# Patient Record
Sex: FEMALE | Race: BLACK OR AFRICAN AMERICAN | NOT HISPANIC OR LATINO | Employment: OTHER | URBAN - METROPOLITAN AREA
[De-identification: names, ages, dates, MRNs, and addresses within clinical notes are randomized per-mention and may not be internally consistent; named-entity substitution may affect disease eponyms.]

---

## 2017-04-25 ENCOUNTER — HISTORICAL (OUTPATIENT)
Dept: RADIOLOGY | Facility: HOSPITAL | Age: 67
End: 2017-04-25

## 2017-05-01 LAB
INFLUENZA A ANTIGEN, POC: NEGATIVE
INFLUENZA B ANTIGEN, POC: NEGATIVE
RAPID GROUP A STREP (OHS): POSITIVE

## 2018-04-26 ENCOUNTER — HISTORICAL (OUTPATIENT)
Dept: RADIOLOGY | Facility: HOSPITAL | Age: 68
End: 2018-04-26

## 2018-08-08 ENCOUNTER — HISTORICAL (OUTPATIENT)
Dept: ADMINISTRATIVE | Facility: HOSPITAL | Age: 68
End: 2018-08-08

## 2018-08-08 LAB
ABS NEUT (OLG): 3.71 X10(3)/MCL (ref 2.1–9.2)
ALBUMIN SERPL-MCNC: 3.4 GM/DL (ref 3.4–5)
ALBUMIN/GLOB SERPL: 0.7 {RATIO}
ALP SERPL-CCNC: 105 UNIT/L (ref 38–126)
ALT SERPL-CCNC: 19 UNIT/L (ref 12–78)
AST SERPL-CCNC: 18 UNIT/L (ref 15–37)
BASOPHILS # BLD AUTO: 0 X10(3)/MCL (ref 0–0.2)
BASOPHILS NFR BLD AUTO: 0.4 %
BILIRUB SERPL-MCNC: 0.3 MG/DL (ref 0.2–1)
BILIRUBIN DIRECT+TOT PNL SERPL-MCNC: 0.1 MG/DL (ref 0–0.2)
BILIRUBIN DIRECT+TOT PNL SERPL-MCNC: 0.2 MG/DL (ref 0–0.8)
BUN SERPL-MCNC: 18 MG/DL (ref 7–18)
CALCIUM SERPL-MCNC: 9.3 MG/DL (ref 8.5–10.1)
CEA SERPL-MCNC: 2.4 NG/ML (ref 0–3)
CHLORIDE SERPL-SCNC: 108 MMOL/L (ref 98–107)
CO2 SERPL-SCNC: 20 MMOL/L (ref 21–32)
CREAT SERPL-MCNC: 1.02 MG/DL (ref 0.55–1.02)
EOSINOPHIL # BLD AUTO: 0.1 X10(3)/MCL (ref 0–0.9)
EOSINOPHIL NFR BLD AUTO: 1.7 %
ERYTHROCYTE [DISTWIDTH] IN BLOOD BY AUTOMATED COUNT: 15.9 % (ref 11.5–17)
GLOBULIN SER-MCNC: 4.7 GM/DL (ref 2.4–3.5)
GLUCOSE SERPL-MCNC: 96 MG/DL (ref 74–106)
HCT VFR BLD AUTO: 31.1 % (ref 37–47)
HGB BLD-MCNC: 9.4 GM/DL (ref 12–16)
LYMPHOCYTES # BLD AUTO: 2.5 X10(3)/MCL (ref 0.6–4.6)
LYMPHOCYTES NFR BLD AUTO: 34.7 %
MCH RBC QN AUTO: 26.5 PG (ref 27–31)
MCHC RBC AUTO-ENTMCNC: 30.2 GM/DL (ref 33–36)
MCV RBC AUTO: 87.6 FL (ref 80–94)
MONOCYTES # BLD AUTO: 0.8 X10(3)/MCL (ref 0.1–1.3)
MONOCYTES NFR BLD AUTO: 10.7 %
NEUTROPHILS # BLD AUTO: 3.7 X10(3)/MCL (ref 2.1–9.2)
NEUTROPHILS NFR BLD AUTO: 52.5 %
PLATELET # BLD AUTO: 403 X10(3)/MCL (ref 130–400)
PMV BLD AUTO: 9.4 FL (ref 9.4–12.4)
POTASSIUM SERPL-SCNC: 4.2 MMOL/L (ref 3.5–5.1)
PROT SERPL-MCNC: 8.1 GM/DL (ref 6.4–8.2)
RBC # BLD AUTO: 3.55 X10(6)/MCL (ref 4.2–5.4)
SODIUM SERPL-SCNC: 140 MMOL/L (ref 136–145)
WBC # SPEC AUTO: 7.1 X10(3)/MCL (ref 4.5–11.5)

## 2018-08-15 ENCOUNTER — HISTORICAL (OUTPATIENT)
Dept: ADMINISTRATIVE | Facility: HOSPITAL | Age: 68
End: 2018-08-15

## 2018-08-27 ENCOUNTER — HISTORICAL (OUTPATIENT)
Dept: ADMINISTRATIVE | Facility: HOSPITAL | Age: 68
End: 2018-08-27

## 2018-08-27 LAB
ABS NEUT (OLG): 3.91 X10(3)/MCL (ref 2.1–9.2)
ALBUMIN SERPL-MCNC: 3.2 GM/DL (ref 3.4–5)
ALBUMIN/GLOB SERPL: 0.7 RATIO (ref 1.1–2)
ALP SERPL-CCNC: 100 UNIT/L (ref 38–126)
ALT SERPL-CCNC: 20 UNIT/L (ref 12–78)
AST SERPL-CCNC: 20 UNIT/L (ref 15–37)
BASOPHILS # BLD AUTO: 0 X10(3)/MCL (ref 0–0.2)
BASOPHILS NFR BLD AUTO: 0.3 %
BILIRUB SERPL-MCNC: 0.3 MG/DL (ref 0.2–1)
BILIRUBIN DIRECT+TOT PNL SERPL-MCNC: 0.1 MG/DL (ref 0–0.5)
BILIRUBIN DIRECT+TOT PNL SERPL-MCNC: 0.2 MG/DL (ref 0–0.8)
BUN SERPL-MCNC: 17 MG/DL (ref 7–18)
CALCIUM SERPL-MCNC: 9.7 MG/DL (ref 8.5–10.1)
CEA SERPL-MCNC: 1.2 NG/ML (ref 0–3)
CHLORIDE SERPL-SCNC: 107 MMOL/L (ref 98–107)
CO2 SERPL-SCNC: 22 MMOL/L (ref 21–32)
CREAT SERPL-MCNC: 0.98 MG/DL (ref 0.55–1.02)
EOSINOPHIL # BLD AUTO: 0.2 X10(3)/MCL (ref 0–0.9)
EOSINOPHIL NFR BLD AUTO: 2.3 %
ERYTHROCYTE [DISTWIDTH] IN BLOOD BY AUTOMATED COUNT: 16.1 % (ref 11.5–17)
FERRITIN SERPL-MCNC: 29.6 NG/ML (ref 8–388)
GLOBULIN SER-MCNC: 4.4 GM/DL (ref 2.4–3.5)
GLUCOSE SERPL-MCNC: 114 MG/DL (ref 74–106)
HCT VFR BLD AUTO: 32.8 % (ref 37–47)
HGB BLD-MCNC: 9.7 GM/DL (ref 12–16)
IRON SATN MFR SERPL: 11.3 % (ref 20–50)
IRON SERPL-MCNC: 45 MCG/DL (ref 50–175)
LYMPHOCYTES # BLD AUTO: 2.2 X10(3)/MCL (ref 0.6–4.6)
LYMPHOCYTES NFR BLD AUTO: 31 %
MCH RBC QN AUTO: 26.4 PG (ref 27–31)
MCHC RBC AUTO-ENTMCNC: 29.6 GM/DL (ref 33–36)
MCV RBC AUTO: 89.1 FL (ref 80–94)
MONOCYTES # BLD AUTO: 0.7 X10(3)/MCL (ref 0.1–1.3)
MONOCYTES NFR BLD AUTO: 10.2 %
NEUTROPHILS # BLD AUTO: 3.9 X10(3)/MCL (ref 2.1–9.2)
NEUTROPHILS NFR BLD AUTO: 56.2 %
PLATELET # BLD AUTO: 305 X10(3)/MCL (ref 130–400)
PMV BLD AUTO: 9.2 FL (ref 9.4–12.4)
POTASSIUM SERPL-SCNC: 4.1 MMOL/L (ref 3.5–5.1)
PROT SERPL-MCNC: 7.6 GM/DL (ref 6.4–8.2)
RBC # BLD AUTO: 3.68 X10(6)/MCL (ref 4.2–5.4)
SODIUM SERPL-SCNC: 138 MMOL/L (ref 136–145)
TIBC SERPL-MCNC: 398 MCG/DL (ref 250–450)
TRANSFERRIN SERPL-MCNC: 322 MG/DL (ref 200–360)
WBC # SPEC AUTO: 7 X10(3)/MCL (ref 4.5–11.5)

## 2018-08-28 ENCOUNTER — HISTORICAL (OUTPATIENT)
Dept: INFUSION THERAPY | Facility: HOSPITAL | Age: 68
End: 2018-08-28

## 2018-09-04 ENCOUNTER — HISTORICAL (OUTPATIENT)
Dept: HEMATOLOGY/ONCOLOGY | Facility: CLINIC | Age: 68
End: 2018-09-04

## 2018-09-04 LAB
ABS NEUT (OLG): 3.6 X10(3)/MCL (ref 2.1–9.2)
ANION GAP SERPL CALC-SCNC: 16 MMOL/L
BASOPHILS # BLD AUTO: 0 X10(3)/MCL (ref 0–0.2)
BASOPHILS NFR BLD AUTO: 0.5 %
BUN SERPL-MCNC: 16 MG/DL (ref 8–26)
CHLORIDE SERPL-SCNC: 106 MMOL/L (ref 98–109)
CREAT SERPL-MCNC: 1 MG/DL (ref 0.6–1.3)
EOSINOPHIL # BLD AUTO: 0.2 X10(3)/MCL (ref 0–0.9)
EOSINOPHIL NFR BLD AUTO: 2.6 %
ERYTHROCYTE [DISTWIDTH] IN BLOOD BY AUTOMATED COUNT: 15.5 % (ref 11.5–17)
GLUCOSE SERPL-MCNC: 135 MG/DL (ref 70–105)
HCT VFR BLD AUTO: 31.7 % (ref 37–47)
HCT VFR BLD CALC: 30 % (ref 38–51)
HGB BLD-MCNC: 10.2 MG/DL (ref 12–17)
HGB BLD-MCNC: 9.6 GM/DL (ref 12–16)
LYMPHOCYTES # BLD AUTO: 2.1 X10(3)/MCL (ref 0.6–4.6)
LYMPHOCYTES NFR BLD AUTO: 32.1 %
MCH RBC QN AUTO: 26.7 PG (ref 27–31)
MCHC RBC AUTO-ENTMCNC: 30.3 GM/DL (ref 33–36)
MCV RBC AUTO: 88.3 FL (ref 80–94)
MONOCYTES # BLD AUTO: 0.6 X10(3)/MCL (ref 0.1–1.3)
MONOCYTES NFR BLD AUTO: 9.3 %
NEUTROPHILS # BLD AUTO: 3.6 X10(3)/MCL (ref 2.1–9.2)
NEUTROPHILS NFR BLD AUTO: 55.5 %
PLATELET # BLD AUTO: 361 X10(3)/MCL (ref 130–400)
PMV BLD AUTO: 9.2 FL (ref 9.4–12.4)
POC IONIZED CALCIUM: 1.11 MMOL/L (ref 1.12–1.32)
POC TCO2: 23 MMOL/L (ref 24–29)
POTASSIUM BLD-SCNC: 3.6 MMOL/L (ref 3.5–4.9)
RBC # BLD AUTO: 3.59 X10(6)/MCL (ref 4.2–5.4)
SODIUM BLD-SCNC: 140 MMOL/L (ref 138–146)
WBC # SPEC AUTO: 6.5 X10(3)/MCL (ref 4.5–11.5)

## 2018-09-17 ENCOUNTER — HISTORICAL (OUTPATIENT)
Dept: ADMINISTRATIVE | Facility: HOSPITAL | Age: 68
End: 2018-09-17

## 2018-09-17 LAB
ABS NEUT (OLG): 1.94 X10(3)/MCL (ref 2.1–9.2)
ALBUMIN SERPL-MCNC: 3.2 GM/DL (ref 3.4–5)
ALBUMIN/GLOB SERPL: 0.8 RATIO (ref 1.1–2)
ALP SERPL-CCNC: 79 UNIT/L (ref 38–126)
ALT SERPL-CCNC: 17 UNIT/L (ref 12–78)
AST SERPL-CCNC: 22 UNIT/L (ref 15–37)
BASOPHILS # BLD AUTO: 0 X10(3)/MCL (ref 0–0.2)
BASOPHILS NFR BLD AUTO: 0.4 %
BILIRUB SERPL-MCNC: 0.4 MG/DL (ref 0.2–1)
BILIRUBIN DIRECT+TOT PNL SERPL-MCNC: 0.1 MG/DL (ref 0–0.5)
BILIRUBIN DIRECT+TOT PNL SERPL-MCNC: 0.3 MG/DL (ref 0–0.8)
BUN SERPL-MCNC: 13 MG/DL (ref 7–18)
CALCIUM SERPL-MCNC: 9.3 MG/DL (ref 8.5–10.1)
CHLORIDE SERPL-SCNC: 107 MMOL/L (ref 98–107)
CO2 SERPL-SCNC: 21 MMOL/L (ref 21–32)
CREAT SERPL-MCNC: 0.84 MG/DL (ref 0.55–1.02)
EOSINOPHIL # BLD AUTO: 0.1 X10(3)/MCL (ref 0–0.9)
EOSINOPHIL NFR BLD AUTO: 2.5 %
EOSINOPHIL NFR BLD MANUAL: 2 % (ref 0–8)
ERYTHROCYTE [DISTWIDTH] IN BLOOD BY AUTOMATED COUNT: 17.8 % (ref 11.5–17)
GLOBULIN SER-MCNC: 4.1 GM/DL (ref 2.4–3.5)
GLUCOSE SERPL-MCNC: 96 MG/DL (ref 74–106)
HCT VFR BLD AUTO: 31.2 % (ref 37–47)
HGB BLD-MCNC: 9.4 GM/DL (ref 12–16)
LYMPHOCYTES # BLD AUTO: 1.8 X10(3)/MCL (ref 0.6–4.6)
LYMPHOCYTES NFR BLD AUTO: 37.1 %
LYMPHOCYTES NFR BLD MANUAL: 45 % (ref 13–40)
MCH RBC QN AUTO: 27.2 PG (ref 27–31)
MCHC RBC AUTO-ENTMCNC: 30.1 GM/DL (ref 33–36)
MCV RBC AUTO: 90.4 FL (ref 80–94)
MONOCYTES # BLD AUTO: 0.9 X10(3)/MCL (ref 0.1–1.3)
MONOCYTES NFR BLD AUTO: 19.3 %
MONOCYTES NFR BLD MANUAL: 14 % (ref 2–11)
NEUTROPHILS # BLD AUTO: 1.9 X10(3)/MCL (ref 2.1–9.2)
NEUTROPHILS NFR BLD AUTO: 40.7 %
NEUTROPHILS NFR BLD MANUAL: 39 % (ref 47–80)
PLATELET # BLD AUTO: 304 X10(3)/MCL (ref 130–400)
PLATELET # BLD EST: NORMAL 10*3/UL
PMV BLD AUTO: 9.2 FL (ref 9.4–12.4)
POTASSIUM SERPL-SCNC: 4 MMOL/L (ref 3.5–5.1)
PROT SERPL-MCNC: 7.3 GM/DL (ref 6.4–8.2)
RBC # BLD AUTO: 3.45 X10(6)/MCL (ref 4.2–5.4)
RBC MORPH BLD: NORMAL
SODIUM SERPL-SCNC: 139 MMOL/L (ref 136–145)
WBC # SPEC AUTO: 4.8 X10(3)/MCL (ref 4.5–11.5)

## 2018-09-18 ENCOUNTER — HISTORICAL (OUTPATIENT)
Dept: INFUSION THERAPY | Facility: HOSPITAL | Age: 68
End: 2018-09-18

## 2018-09-25 ENCOUNTER — HISTORICAL (OUTPATIENT)
Dept: HEMATOLOGY/ONCOLOGY | Facility: CLINIC | Age: 68
End: 2018-09-25

## 2018-09-25 LAB
ABS NEUT (OLG): 2.58 X10(3)/MCL (ref 2.1–9.2)
ALBUMIN SERPL-MCNC: 3 GM/DL (ref 3.4–5)
ALBUMIN/GLOB SERPL: 0.8 {RATIO}
ALP SERPL-CCNC: 87 UNIT/L (ref 38–126)
ALT SERPL-CCNC: 23 UNIT/L (ref 12–78)
AST SERPL-CCNC: 33 UNIT/L (ref 15–37)
BASOPHILS # BLD AUTO: 0 X10(3)/MCL (ref 0–0.2)
BASOPHILS NFR BLD AUTO: 0.7 %
BILIRUB SERPL-MCNC: 0.3 MG/DL (ref 0.2–1)
BILIRUBIN DIRECT+TOT PNL SERPL-MCNC: 0.1 MG/DL (ref 0–0.2)
BILIRUBIN DIRECT+TOT PNL SERPL-MCNC: 0.2 MG/DL (ref 0–0.8)
BUN SERPL-MCNC: 14 MG/DL (ref 7–18)
CALCIUM SERPL-MCNC: 8.6 MG/DL (ref 8.5–10.1)
CHLORIDE SERPL-SCNC: 107 MMOL/L (ref 98–107)
CO2 SERPL-SCNC: 25 MMOL/L (ref 21–32)
CREAT SERPL-MCNC: 0.97 MG/DL (ref 0.55–1.02)
EOSINOPHIL # BLD AUTO: 0.2 X10(3)/MCL (ref 0–0.9)
EOSINOPHIL NFR BLD AUTO: 3.3 %
ERYTHROCYTE [DISTWIDTH] IN BLOOD BY AUTOMATED COUNT: 18.7 % (ref 11.5–17)
GLOBULIN SER-MCNC: 3.8 GM/DL (ref 2.4–3.5)
GLUCOSE SERPL-MCNC: 156 MG/DL (ref 74–106)
HCT VFR BLD AUTO: 32.4 % (ref 37–47)
HGB BLD-MCNC: 9.9 GM/DL (ref 12–16)
LYMPHOCYTES # BLD AUTO: 2.1 X10(3)/MCL (ref 0.6–4.6)
LYMPHOCYTES NFR BLD AUTO: 36 %
MCH RBC QN AUTO: 27.5 PG (ref 27–31)
MCHC RBC AUTO-ENTMCNC: 30.6 GM/DL (ref 33–36)
MCV RBC AUTO: 90 FL (ref 80–94)
MONOCYTES # BLD AUTO: 0.8 X10(3)/MCL (ref 0.1–1.3)
MONOCYTES NFR BLD AUTO: 14.9 %
NEUTROPHILS # BLD AUTO: 2.6 X10(3)/MCL (ref 2.1–9.2)
NEUTROPHILS NFR BLD AUTO: 45.1 %
PLATELET # BLD AUTO: 206 X10(3)/MCL (ref 130–400)
PMV BLD AUTO: 9.2 FL (ref 9.4–12.4)
POTASSIUM SERPL-SCNC: 4 MMOL/L (ref 3.5–5.1)
PROT SERPL-MCNC: 6.8 GM/DL (ref 6.4–8.2)
RBC # BLD AUTO: 3.6 X10(6)/MCL (ref 4.2–5.4)
SODIUM SERPL-SCNC: 142 MMOL/L (ref 136–145)
WBC # SPEC AUTO: 5.7 X10(3)/MCL (ref 4.5–11.5)

## 2018-10-02 ENCOUNTER — HISTORICAL (OUTPATIENT)
Dept: HEMATOLOGY/ONCOLOGY | Facility: CLINIC | Age: 68
End: 2018-10-02

## 2018-10-02 LAB
ABS NEUT (OLG): 3.27 X10(3)/MCL (ref 2.1–9.2)
ALBUMIN SERPL-MCNC: 3.2 GM/DL (ref 3.4–5)
ALBUMIN/GLOB SERPL: 0.8 {RATIO}
ALP SERPL-CCNC: 81 UNIT/L (ref 38–126)
ALT SERPL-CCNC: 19 UNIT/L (ref 12–78)
AST SERPL-CCNC: 26 UNIT/L (ref 15–37)
BASOPHILS # BLD AUTO: 0 X10(3)/MCL (ref 0–0.2)
BASOPHILS NFR BLD AUTO: 0.6 %
BILIRUB SERPL-MCNC: 0.6 MG/DL (ref 0.2–1)
BILIRUBIN DIRECT+TOT PNL SERPL-MCNC: 0.2 MG/DL (ref 0–0.2)
BILIRUBIN DIRECT+TOT PNL SERPL-MCNC: 0.4 MG/DL (ref 0–0.8)
BUN SERPL-MCNC: 19 MG/DL (ref 7–18)
CALCIUM SERPL-MCNC: 8.9 MG/DL (ref 8.5–10.1)
CHLORIDE SERPL-SCNC: 107 MMOL/L (ref 98–107)
CO2 SERPL-SCNC: 27 MMOL/L (ref 21–32)
CREAT SERPL-MCNC: 1.04 MG/DL (ref 0.55–1.02)
EOSINOPHIL # BLD AUTO: 0.1 X10(3)/MCL (ref 0–0.9)
EOSINOPHIL NFR BLD AUTO: 2.1 %
ERYTHROCYTE [DISTWIDTH] IN BLOOD BY AUTOMATED COUNT: 20.1 % (ref 11.5–17)
GLOBULIN SER-MCNC: 4.2 GM/DL (ref 2.4–3.5)
GLUCOSE SERPL-MCNC: 132 MG/DL (ref 74–106)
HCT VFR BLD AUTO: 32.6 % (ref 37–47)
HGB BLD-MCNC: 10.1 GM/DL (ref 12–16)
LYMPHOCYTES # BLD AUTO: 2.2 X10(3)/MCL (ref 0.6–4.6)
LYMPHOCYTES NFR BLD AUTO: 33.2 %
MCH RBC QN AUTO: 28.2 PG (ref 27–31)
MCHC RBC AUTO-ENTMCNC: 31 GM/DL (ref 33–36)
MCV RBC AUTO: 91.1 FL (ref 80–94)
MONOCYTES # BLD AUTO: 1 X10(3)/MCL (ref 0.1–1.3)
MONOCYTES NFR BLD AUTO: 14.8 %
NEUTROPHILS # BLD AUTO: 3.3 X10(3)/MCL (ref 2.1–9.2)
NEUTROPHILS NFR BLD AUTO: 49.3 %
PLATELET # BLD AUTO: 187 X10(3)/MCL (ref 130–400)
PMV BLD AUTO: 9.1 FL (ref 9.4–12.4)
POTASSIUM SERPL-SCNC: 4 MMOL/L (ref 3.5–5.1)
PROT SERPL-MCNC: 7.4 GM/DL (ref 6.4–8.2)
RBC # BLD AUTO: 3.58 X10(6)/MCL (ref 4.2–5.4)
SODIUM SERPL-SCNC: 144 MMOL/L (ref 136–145)
WBC # SPEC AUTO: 6.6 X10(3)/MCL (ref 4.5–11.5)

## 2018-10-08 ENCOUNTER — HISTORICAL (OUTPATIENT)
Dept: ADMINISTRATIVE | Facility: HOSPITAL | Age: 68
End: 2018-10-08

## 2018-10-08 LAB
ABS NEUT (OLG): 2.42 X10(3)/MCL (ref 2.1–9.2)
ALBUMIN SERPL-MCNC: 2.9 GM/DL (ref 3.4–5)
ALBUMIN/GLOB SERPL: 0.7 RATIO (ref 1.1–2)
ALP SERPL-CCNC: 80 UNIT/L (ref 38–126)
ALT SERPL-CCNC: 17 UNIT/L (ref 12–78)
AST SERPL-CCNC: 22 UNIT/L (ref 15–37)
BASOPHILS # BLD AUTO: 0 X10(3)/MCL (ref 0–0.2)
BASOPHILS NFR BLD AUTO: 0.4 %
BILIRUB SERPL-MCNC: 0.4 MG/DL (ref 0.2–1)
BILIRUBIN DIRECT+TOT PNL SERPL-MCNC: 0.2 MG/DL (ref 0–0.5)
BILIRUBIN DIRECT+TOT PNL SERPL-MCNC: 0.2 MG/DL (ref 0–0.8)
BUN SERPL-MCNC: 10 MG/DL (ref 7–18)
CALCIUM SERPL-MCNC: 9.2 MG/DL (ref 8.5–10.1)
CHLORIDE SERPL-SCNC: 107 MMOL/L (ref 98–107)
CO2 SERPL-SCNC: 24 MMOL/L (ref 21–32)
CREAT SERPL-MCNC: 0.99 MG/DL (ref 0.55–1.02)
EOSINOPHIL # BLD AUTO: 0.2 X10(3)/MCL (ref 0–0.9)
EOSINOPHIL NFR BLD AUTO: 3 %
ERYTHROCYTE [DISTWIDTH] IN BLOOD BY AUTOMATED COUNT: 21 % (ref 11.5–17)
GLOBULIN SER-MCNC: 4.1 GM/DL (ref 2.4–3.5)
GLUCOSE SERPL-MCNC: 160 MG/DL (ref 74–106)
HCT VFR BLD AUTO: 32.2 % (ref 37–47)
HGB BLD-MCNC: 9.7 GM/DL (ref 12–16)
LYMPHOCYTES # BLD AUTO: 1.6 X10(3)/MCL (ref 0.6–4.6)
LYMPHOCYTES NFR BLD AUTO: 33.1 %
MCH RBC QN AUTO: 28 PG (ref 27–31)
MCHC RBC AUTO-ENTMCNC: 30.1 GM/DL (ref 33–36)
MCV RBC AUTO: 93.1 FL (ref 80–94)
MONOCYTES # BLD AUTO: 0.7 X10(3)/MCL (ref 0.1–1.3)
MONOCYTES NFR BLD AUTO: 14.5 %
NEUTROPHILS # BLD AUTO: 2.4 X10(3)/MCL (ref 2.1–9.2)
NEUTROPHILS NFR BLD AUTO: 49 %
PLATELET # BLD AUTO: 234 X10(3)/MCL (ref 130–400)
PMV BLD AUTO: 9.3 FL (ref 9.4–12.4)
POTASSIUM SERPL-SCNC: 3.6 MMOL/L (ref 3.5–5.1)
PROT SERPL-MCNC: 7 GM/DL (ref 6.4–8.2)
RBC # BLD AUTO: 3.46 X10(6)/MCL (ref 4.2–5.4)
SODIUM SERPL-SCNC: 141 MMOL/L (ref 136–145)
WBC # SPEC AUTO: 5 X10(3)/MCL (ref 4.5–11.5)

## 2018-10-09 ENCOUNTER — HISTORICAL (OUTPATIENT)
Dept: INFUSION THERAPY | Facility: HOSPITAL | Age: 68
End: 2018-10-09

## 2018-10-16 ENCOUNTER — HISTORICAL (OUTPATIENT)
Dept: HEMATOLOGY/ONCOLOGY | Facility: CLINIC | Age: 68
End: 2018-10-16

## 2018-10-16 LAB
ABS NEUT (OLG): 4.36 X10(3)/MCL (ref 2.1–9.2)
ALBUMIN SERPL-MCNC: 3.3 GM/DL (ref 3.4–5)
ALBUMIN/GLOB SERPL: 0.8 {RATIO}
ALP SERPL-CCNC: 92 UNIT/L (ref 38–126)
ALT SERPL-CCNC: 22 UNIT/L (ref 12–78)
AST SERPL-CCNC: 35 UNIT/L (ref 15–37)
BASOPHILS # BLD AUTO: 0 X10(3)/MCL (ref 0–0.2)
BASOPHILS NFR BLD AUTO: 0.4 %
BILIRUB SERPL-MCNC: 0.6 MG/DL (ref 0.2–1)
BILIRUBIN DIRECT+TOT PNL SERPL-MCNC: 0.2 MG/DL (ref 0–0.2)
BILIRUBIN DIRECT+TOT PNL SERPL-MCNC: 0.4 MG/DL (ref 0–0.8)
BUN SERPL-MCNC: 14 MG/DL (ref 7–18)
CALCIUM SERPL-MCNC: 8.7 MG/DL (ref 8.5–10.1)
CHLORIDE SERPL-SCNC: 107 MMOL/L (ref 98–107)
CO2 SERPL-SCNC: 27 MMOL/L (ref 21–32)
CREAT SERPL-MCNC: 1.13 MG/DL (ref 0.55–1.02)
EOSINOPHIL # BLD AUTO: 0.2 X10(3)/MCL (ref 0–0.9)
EOSINOPHIL NFR BLD AUTO: 2.5 %
ERYTHROCYTE [DISTWIDTH] IN BLOOD BY AUTOMATED COUNT: 21.5 % (ref 11.5–17)
GLOBULIN SER-MCNC: 4.1 GM/DL (ref 2.4–3.5)
GLUCOSE SERPL-MCNC: 173 MG/DL (ref 74–106)
HCT VFR BLD AUTO: 34 % (ref 37–47)
HGB BLD-MCNC: 10.7 GM/DL (ref 12–16)
LYMPHOCYTES # BLD AUTO: 2.1 X10(3)/MCL (ref 0.6–4.6)
LYMPHOCYTES NFR BLD AUTO: 27.6 %
MCH RBC QN AUTO: 29 PG (ref 27–31)
MCHC RBC AUTO-ENTMCNC: 31.5 GM/DL (ref 33–36)
MCV RBC AUTO: 92.1 FL (ref 80–94)
MONOCYTES # BLD AUTO: 1 X10(3)/MCL (ref 0.1–1.3)
MONOCYTES NFR BLD AUTO: 13.3 %
NEUTROPHILS # BLD AUTO: 4.4 X10(3)/MCL (ref 2.1–9.2)
NEUTROPHILS NFR BLD AUTO: 56.2 %
PLATELET # BLD AUTO: 200 X10(3)/MCL (ref 130–400)
PMV BLD AUTO: 8.9 FL (ref 9.4–12.4)
POTASSIUM SERPL-SCNC: 4 MMOL/L (ref 3.5–5.1)
PROT SERPL-MCNC: 7.4 GM/DL (ref 6.4–8.2)
RBC # BLD AUTO: 3.69 X10(6)/MCL (ref 4.2–5.4)
SODIUM SERPL-SCNC: 141 MMOL/L (ref 136–145)
WBC # SPEC AUTO: 7.8 X10(3)/MCL (ref 4.5–11.5)

## 2018-10-23 ENCOUNTER — HISTORICAL (OUTPATIENT)
Dept: HEMATOLOGY/ONCOLOGY | Facility: CLINIC | Age: 68
End: 2018-10-23

## 2018-10-23 LAB
ABS NEUT (OLG): 2.61 X10(3)/MCL (ref 2.1–9.2)
ALBUMIN SERPL-MCNC: 3 GM/DL (ref 3.4–5)
ALBUMIN/GLOB SERPL: 0.7 {RATIO}
ALP SERPL-CCNC: 81 UNIT/L (ref 38–126)
ALT SERPL-CCNC: 18 UNIT/L (ref 12–78)
AST SERPL-CCNC: 28 UNIT/L (ref 15–37)
BASOPHILS # BLD AUTO: 0 X10(3)/MCL (ref 0–0.2)
BASOPHILS NFR BLD AUTO: 0.6 %
BILIRUB SERPL-MCNC: 0.5 MG/DL (ref 0.2–1)
BILIRUBIN DIRECT+TOT PNL SERPL-MCNC: 0.2 MG/DL (ref 0–0.2)
BILIRUBIN DIRECT+TOT PNL SERPL-MCNC: 0.3 MG/DL (ref 0–0.8)
BUN SERPL-MCNC: 16 MG/DL (ref 7–18)
CALCIUM SERPL-MCNC: 9.2 MG/DL (ref 8.5–10.1)
CHLORIDE SERPL-SCNC: 108 MMOL/L (ref 98–107)
CO2 SERPL-SCNC: 25 MMOL/L (ref 21–32)
CREAT SERPL-MCNC: 0.94 MG/DL (ref 0.55–1.02)
EOSINOPHIL # BLD AUTO: 0.1 X10(3)/MCL (ref 0–0.9)
EOSINOPHIL NFR BLD AUTO: 2.3 %
ERYTHROCYTE [DISTWIDTH] IN BLOOD BY AUTOMATED COUNT: 22.8 % (ref 11.5–17)
GLOBULIN SER-MCNC: 4.1 GM/DL (ref 2.4–3.5)
GLUCOSE SERPL-MCNC: 186 MG/DL (ref 74–106)
HCT VFR BLD AUTO: 31.5 % (ref 37–47)
HGB BLD-MCNC: 9.8 GM/DL (ref 12–16)
LYMPHOCYTES # BLD AUTO: 1.7 X10(3)/MCL (ref 0.6–4.6)
LYMPHOCYTES NFR BLD AUTO: 33.3 %
MCH RBC QN AUTO: 29.2 PG (ref 27–31)
MCHC RBC AUTO-ENTMCNC: 31.1 GM/DL (ref 33–36)
MCV RBC AUTO: 93.8 FL (ref 80–94)
MONOCYTES # BLD AUTO: 0.6 X10(3)/MCL (ref 0.1–1.3)
MONOCYTES NFR BLD AUTO: 12.7 %
NEUTROPHILS # BLD AUTO: 2.6 X10(3)/MCL (ref 2.1–9.2)
NEUTROPHILS NFR BLD AUTO: 51.1 %
PLATELET # BLD AUTO: 166 X10(3)/MCL (ref 130–400)
PMV BLD AUTO: 9.5 FL (ref 9.4–12.4)
POTASSIUM SERPL-SCNC: 4.6 MMOL/L (ref 3.5–5.1)
PROT SERPL-MCNC: 7.1 GM/DL (ref 6.4–8.2)
RBC # BLD AUTO: 3.36 X10(6)/MCL (ref 4.2–5.4)
SODIUM SERPL-SCNC: 144 MMOL/L (ref 136–145)
WBC # SPEC AUTO: 5.1 X10(3)/MCL (ref 4.5–11.5)

## 2018-10-29 ENCOUNTER — HISTORICAL (OUTPATIENT)
Dept: ADMINISTRATIVE | Facility: HOSPITAL | Age: 68
End: 2018-10-29

## 2018-10-29 LAB
ABS NEUT (OLG): 1.83 X10(3)/MCL (ref 2.1–9.2)
ALBUMIN SERPL-MCNC: 3 GM/DL (ref 3.4–5)
ALBUMIN/GLOB SERPL: 0.8 {RATIO}
ALP SERPL-CCNC: 80 UNIT/L (ref 38–126)
ALT SERPL-CCNC: 18 UNIT/L (ref 12–78)
AST SERPL-CCNC: 23 UNIT/L (ref 15–37)
BASOPHILS # BLD AUTO: 0 X10(3)/MCL (ref 0–0.2)
BASOPHILS NFR BLD AUTO: 0.6 %
BILIRUB SERPL-MCNC: 0.3 MG/DL (ref 0.2–1)
BILIRUBIN DIRECT+TOT PNL SERPL-MCNC: 0.1 MG/DL (ref 0–0.2)
BILIRUBIN DIRECT+TOT PNL SERPL-MCNC: 0.2 MG/DL (ref 0–0.8)
BUN SERPL-MCNC: 19 MG/DL (ref 7–18)
CALCIUM SERPL-MCNC: 8.5 MG/DL (ref 8.5–10.1)
CEA SERPL-MCNC: 2.7 NG/ML (ref 0–3)
CHLORIDE SERPL-SCNC: 108 MMOL/L (ref 98–107)
CO2 SERPL-SCNC: 26 MMOL/L (ref 21–32)
CREAT SERPL-MCNC: 1.16 MG/DL (ref 0.55–1.02)
EOSINOPHIL # BLD AUTO: 0.2 X10(3)/MCL (ref 0–0.9)
EOSINOPHIL NFR BLD AUTO: 3.3 %
ERYTHROCYTE [DISTWIDTH] IN BLOOD BY AUTOMATED COUNT: 23.3 % (ref 11.5–17)
GLOBULIN SER-MCNC: 3.8 GM/DL (ref 2.4–3.5)
GLUCOSE SERPL-MCNC: 192 MG/DL (ref 74–106)
HCT VFR BLD AUTO: 31.8 % (ref 37–47)
HGB BLD-MCNC: 10 GM/DL (ref 12–16)
LYMPHOCYTES # BLD AUTO: 1.9 X10(3)/MCL (ref 0.6–4.6)
LYMPHOCYTES NFR BLD AUTO: 40.4 %
MCH RBC QN AUTO: 30 PG (ref 27–31)
MCHC RBC AUTO-ENTMCNC: 31.4 GM/DL (ref 33–36)
MCV RBC AUTO: 95.5 FL (ref 80–94)
MONOCYTES # BLD AUTO: 0.8 X10(3)/MCL (ref 0.1–1.3)
MONOCYTES NFR BLD AUTO: 17.4 %
NEUTROPHILS # BLD AUTO: 1.8 X10(3)/MCL (ref 2.1–9.2)
NEUTROPHILS NFR BLD AUTO: 38.3 %
PLATELET # BLD AUTO: 201 X10(3)/MCL (ref 130–400)
PMV BLD AUTO: 8.8 FL (ref 9.4–12.4)
POTASSIUM SERPL-SCNC: 4.2 MMOL/L (ref 3.5–5.1)
PROT SERPL-MCNC: 6.8 GM/DL (ref 6.4–8.2)
RBC # BLD AUTO: 3.33 X10(6)/MCL (ref 4.2–5.4)
SODIUM SERPL-SCNC: 142 MMOL/L (ref 136–145)
WBC # SPEC AUTO: 4.8 X10(3)/MCL (ref 4.5–11.5)

## 2018-10-30 ENCOUNTER — HISTORICAL (OUTPATIENT)
Dept: INFUSION THERAPY | Facility: HOSPITAL | Age: 68
End: 2018-10-30

## 2018-11-07 ENCOUNTER — HISTORICAL (OUTPATIENT)
Dept: HEMATOLOGY/ONCOLOGY | Facility: CLINIC | Age: 68
End: 2018-11-07

## 2018-11-07 LAB
ABS NEUT (OLG): 3.16 X10(3)/MCL (ref 2.1–9.2)
ALBUMIN SERPL-MCNC: 3 GM/DL (ref 3.4–5)
ALBUMIN/GLOB SERPL: 0.8 {RATIO}
ALP SERPL-CCNC: 90 UNIT/L (ref 38–126)
ALT SERPL-CCNC: 19 UNIT/L (ref 12–78)
AST SERPL-CCNC: 28 UNIT/L (ref 15–37)
BASOPHILS # BLD AUTO: 0 X10(3)/MCL (ref 0–0.2)
BASOPHILS NFR BLD AUTO: 0.6 %
BILIRUB SERPL-MCNC: 0.4 MG/DL (ref 0.2–1)
BILIRUBIN DIRECT+TOT PNL SERPL-MCNC: 0.2 MG/DL (ref 0–0.2)
BILIRUBIN DIRECT+TOT PNL SERPL-MCNC: 0.2 MG/DL (ref 0–0.8)
BUN SERPL-MCNC: 15 MG/DL (ref 7–18)
CALCIUM SERPL-MCNC: 8.9 MG/DL (ref 8.5–10.1)
CHLORIDE SERPL-SCNC: 107 MMOL/L (ref 98–107)
CO2 SERPL-SCNC: 25 MMOL/L (ref 21–32)
CREAT SERPL-MCNC: 1.01 MG/DL (ref 0.55–1.02)
EOSINOPHIL # BLD AUTO: 0.2 X10(3)/MCL (ref 0–0.9)
EOSINOPHIL NFR BLD AUTO: 3.7 %
ERYTHROCYTE [DISTWIDTH] IN BLOOD BY AUTOMATED COUNT: 23.2 % (ref 11.5–17)
GLOBULIN SER-MCNC: 3.9 GM/DL (ref 2.4–3.5)
GLUCOSE SERPL-MCNC: 180 MG/DL (ref 74–106)
HCT VFR BLD AUTO: 31.2 % (ref 37–47)
HGB BLD-MCNC: 9.7 GM/DL (ref 12–16)
LYMPHOCYTES # BLD AUTO: 2 X10(3)/MCL (ref 0.6–4.6)
LYMPHOCYTES NFR BLD AUTO: 31.5 %
MCH RBC QN AUTO: 29.8 PG (ref 27–31)
MCHC RBC AUTO-ENTMCNC: 31.1 GM/DL (ref 33–36)
MCV RBC AUTO: 96 FL (ref 80–94)
MONOCYTES # BLD AUTO: 0.8 X10(3)/MCL (ref 0.1–1.3)
MONOCYTES NFR BLD AUTO: 13.2 %
NEUTROPHILS # BLD AUTO: 3.2 X10(3)/MCL (ref 2.1–9.2)
NEUTROPHILS NFR BLD AUTO: 51 %
PLATELET # BLD AUTO: 166 X10(3)/MCL (ref 130–400)
PMV BLD AUTO: 9 FL (ref 9.4–12.4)
POTASSIUM SERPL-SCNC: 4.2 MMOL/L (ref 3.5–5.1)
PROT SERPL-MCNC: 6.9 GM/DL (ref 6.4–8.2)
RBC # BLD AUTO: 3.25 X10(6)/MCL (ref 4.2–5.4)
SODIUM SERPL-SCNC: 140 MMOL/L (ref 136–145)
WBC # SPEC AUTO: 6.2 X10(3)/MCL (ref 4.5–11.5)

## 2018-11-14 ENCOUNTER — HISTORICAL (OUTPATIENT)
Dept: HEMATOLOGY/ONCOLOGY | Facility: CLINIC | Age: 68
End: 2018-11-14

## 2018-11-14 LAB
ABS NEUT (OLG): 2.68 X10(3)/MCL (ref 2.1–9.2)
ALBUMIN SERPL-MCNC: 2.9 GM/DL (ref 3.4–5)
ALBUMIN/GLOB SERPL: 0.7 RATIO (ref 1.1–2)
ALP SERPL-CCNC: 104 UNIT/L (ref 38–126)
ALT SERPL-CCNC: 20 UNIT/L (ref 12–78)
AST SERPL-CCNC: 28 UNIT/L (ref 15–37)
BASOPHILS # BLD AUTO: 0 X10(3)/MCL (ref 0–0.2)
BASOPHILS NFR BLD AUTO: 0.7 %
BILIRUB SERPL-MCNC: 0.4 MG/DL (ref 0.2–1)
BILIRUBIN DIRECT+TOT PNL SERPL-MCNC: 0.1 MG/DL (ref 0–0.5)
BILIRUBIN DIRECT+TOT PNL SERPL-MCNC: 0.3 MG/DL (ref 0–0.8)
BUN SERPL-MCNC: 14 MG/DL (ref 7–18)
CALCIUM SERPL-MCNC: 8.5 MG/DL (ref 8.5–10.1)
CHLORIDE SERPL-SCNC: 108 MMOL/L (ref 98–107)
CO2 SERPL-SCNC: 28 MMOL/L (ref 21–32)
CREAT SERPL-MCNC: 1.05 MG/DL (ref 0.55–1.02)
EOSINOPHIL # BLD AUTO: 0.2 X10(3)/MCL (ref 0–0.9)
EOSINOPHIL NFR BLD AUTO: 3.6 %
ERYTHROCYTE [DISTWIDTH] IN BLOOD BY AUTOMATED COUNT: 22.2 % (ref 11.5–17)
GLOBULIN SER-MCNC: 4.3 GM/DL (ref 2.4–3.5)
GLUCOSE SERPL-MCNC: 188 MG/DL (ref 74–106)
HCT VFR BLD AUTO: 33.9 % (ref 37–47)
HGB BLD-MCNC: 10.3 GM/DL (ref 12–16)
LYMPHOCYTES # BLD AUTO: 1.7 X10(3)/MCL (ref 0.6–4.6)
LYMPHOCYTES NFR BLD AUTO: 31.4 %
MCH RBC QN AUTO: 29.6 PG (ref 27–31)
MCHC RBC AUTO-ENTMCNC: 30.4 GM/DL (ref 33–36)
MCV RBC AUTO: 97.4 FL (ref 80–94)
MONOCYTES # BLD AUTO: 0.7 X10(3)/MCL (ref 0.1–1.3)
MONOCYTES NFR BLD AUTO: 13.8 %
NEUTROPHILS # BLD AUTO: 2.7 X10(3)/MCL (ref 2.1–9.2)
NEUTROPHILS NFR BLD AUTO: 50.1 %
PLATELET # BLD AUTO: 135 X10(3)/MCL (ref 130–400)
PMV BLD AUTO: 9 FL (ref 9.4–12.4)
POTASSIUM SERPL-SCNC: 4.2 MMOL/L (ref 3.5–5.1)
PROT SERPL-MCNC: 7.2 GM/DL (ref 6.4–8.2)
RBC # BLD AUTO: 3.48 X10(6)/MCL (ref 4.2–5.4)
SODIUM SERPL-SCNC: 141 MMOL/L (ref 136–145)
WBC # SPEC AUTO: 5.4 X10(3)/MCL (ref 4.5–11.5)

## 2018-11-20 ENCOUNTER — HISTORICAL (OUTPATIENT)
Dept: RADIOLOGY | Facility: HOSPITAL | Age: 68
End: 2018-11-20

## 2018-11-27 ENCOUNTER — HISTORICAL (OUTPATIENT)
Dept: ADMINISTRATIVE | Facility: HOSPITAL | Age: 68
End: 2018-11-27

## 2018-11-27 LAB
ABS NEUT (OLG): 2.74 X10(3)/MCL (ref 2.1–9.2)
ALBUMIN SERPL-MCNC: 3.1 GM/DL (ref 3.4–5)
ALBUMIN/GLOB SERPL: 0.7 RATIO (ref 1.1–2)
ALP SERPL-CCNC: 93 UNIT/L (ref 38–126)
ALT SERPL-CCNC: 19 UNIT/L (ref 12–78)
AST SERPL-CCNC: 22 UNIT/L (ref 15–37)
BASOPHILS # BLD AUTO: 0 X10(3)/MCL (ref 0–0.2)
BASOPHILS NFR BLD AUTO: 0.9 %
BILIRUB SERPL-MCNC: 0.2 MG/DL (ref 0.2–1)
BILIRUBIN DIRECT+TOT PNL SERPL-MCNC: 0.1 MG/DL (ref 0–0.5)
BILIRUBIN DIRECT+TOT PNL SERPL-MCNC: 0.1 MG/DL (ref 0–0.8)
BUN SERPL-MCNC: 16 MG/DL (ref 7–18)
CALCIUM SERPL-MCNC: 8.5 MG/DL (ref 8.5–10.1)
CHLORIDE SERPL-SCNC: 107 MMOL/L (ref 98–107)
CO2 SERPL-SCNC: 27 MMOL/L (ref 21–32)
CREAT SERPL-MCNC: 0.93 MG/DL (ref 0.55–1.02)
EOSINOPHIL # BLD AUTO: 0.2 X10(3)/MCL (ref 0–0.9)
EOSINOPHIL NFR BLD AUTO: 3.4 %
ERYTHROCYTE [DISTWIDTH] IN BLOOD BY AUTOMATED COUNT: 20.1 % (ref 11.5–17)
GLOBULIN SER-MCNC: 4.3 GM/DL (ref 2.4–3.5)
GLUCOSE SERPL-MCNC: 91 MG/DL (ref 74–106)
HCT VFR BLD AUTO: 34.2 % (ref 37–47)
HGB BLD-MCNC: 10.5 GM/DL (ref 12–16)
LYMPHOCYTES # BLD AUTO: 2 X10(3)/MCL (ref 0.6–4.6)
LYMPHOCYTES NFR BLD AUTO: 34 %
MCH RBC QN AUTO: 29.7 PG (ref 27–31)
MCHC RBC AUTO-ENTMCNC: 30.7 GM/DL (ref 33–36)
MCV RBC AUTO: 96.6 FL (ref 80–94)
MONOCYTES # BLD AUTO: 0.8 X10(3)/MCL (ref 0.1–1.3)
MONOCYTES NFR BLD AUTO: 14.1 %
NEUTROPHILS # BLD AUTO: 2.7 X10(3)/MCL (ref 2.1–9.2)
NEUTROPHILS NFR BLD AUTO: 47.1 %
PLATELET # BLD AUTO: 212 X10(3)/MCL (ref 130–400)
PMV BLD AUTO: 9.1 FL (ref 9.4–12.4)
POTASSIUM SERPL-SCNC: 3.9 MMOL/L (ref 3.5–5.1)
PROT SERPL-MCNC: 7.4 GM/DL (ref 6.4–8.2)
RBC # BLD AUTO: 3.54 X10(6)/MCL (ref 4.2–5.4)
SODIUM SERPL-SCNC: 142 MMOL/L (ref 136–145)
WBC # SPEC AUTO: 5.8 X10(3)/MCL (ref 4.5–11.5)

## 2019-01-07 ENCOUNTER — HISTORICAL (OUTPATIENT)
Dept: INFUSION THERAPY | Facility: HOSPITAL | Age: 69
End: 2019-01-07

## 2019-01-14 ENCOUNTER — HISTORICAL (OUTPATIENT)
Dept: RADIOLOGY | Facility: HOSPITAL | Age: 69
End: 2019-01-14

## 2019-02-27 ENCOUNTER — HISTORICAL (OUTPATIENT)
Dept: ADMINISTRATIVE | Facility: HOSPITAL | Age: 69
End: 2019-02-27

## 2019-02-27 LAB
ABS NEUT (OLG): 2.65 X10(3)/MCL (ref 2.1–9.2)
ALBUMIN SERPL-MCNC: 3.5 GM/DL (ref 3.4–5)
ALBUMIN/GLOB SERPL: 0.8 {RATIO}
ALP SERPL-CCNC: 100 UNIT/L (ref 38–126)
ALT SERPL-CCNC: 21 UNIT/L (ref 12–78)
AST SERPL-CCNC: 13 UNIT/L (ref 15–37)
BASOPHILS # BLD AUTO: 0 X10(3)/MCL (ref 0–0.2)
BASOPHILS NFR BLD AUTO: 0.5 %
BILIRUB SERPL-MCNC: 0.5 MG/DL (ref 0.2–1)
BILIRUBIN DIRECT+TOT PNL SERPL-MCNC: 0.1 MG/DL (ref 0–0.2)
BILIRUBIN DIRECT+TOT PNL SERPL-MCNC: 0.4 MG/DL (ref 0–0.8)
BUN SERPL-MCNC: 25 MG/DL (ref 7–18)
CALCIUM SERPL-MCNC: 8.6 MG/DL (ref 8.5–10.1)
CEA SERPL-MCNC: 1.4 NG/ML (ref 0–3)
CHLORIDE SERPL-SCNC: 105 MMOL/L (ref 98–107)
CO2 SERPL-SCNC: 29 MMOL/L (ref 21–32)
CREAT SERPL-MCNC: 0.94 MG/DL (ref 0.55–1.02)
EOSINOPHIL # BLD AUTO: 0.2 X10(3)/MCL (ref 0–0.9)
EOSINOPHIL NFR BLD AUTO: 3.1 %
ERYTHROCYTE [DISTWIDTH] IN BLOOD BY AUTOMATED COUNT: 13.5 % (ref 11.5–17)
GLOBULIN SER-MCNC: 4.3 GM/DL (ref 2.4–3.5)
GLUCOSE SERPL-MCNC: 110 MG/DL (ref 74–106)
HCT VFR BLD AUTO: 36.8 % (ref 37–47)
HGB BLD-MCNC: 11.1 GM/DL (ref 12–16)
LYMPHOCYTES # BLD AUTO: 2.1 X10(3)/MCL (ref 0.6–4.6)
LYMPHOCYTES NFR BLD AUTO: 37.4 %
MCH RBC QN AUTO: 27.7 PG (ref 27–31)
MCHC RBC AUTO-ENTMCNC: 30.2 GM/DL (ref 33–36)
MCV RBC AUTO: 91.8 FL (ref 80–94)
MONOCYTES # BLD AUTO: 0.6 X10(3)/MCL (ref 0.1–1.3)
MONOCYTES NFR BLD AUTO: 10.7 %
NEUTROPHILS # BLD AUTO: 2.6 X10(3)/MCL (ref 2.1–9.2)
NEUTROPHILS NFR BLD AUTO: 47.9 %
PLATELET # BLD AUTO: 305 X10(3)/MCL (ref 130–400)
PMV BLD AUTO: 8.9 FL (ref 9.4–12.4)
POTASSIUM SERPL-SCNC: 4.5 MMOL/L (ref 3.5–5.1)
PROT SERPL-MCNC: 7.8 GM/DL (ref 6.4–8.2)
RBC # BLD AUTO: 4.01 X10(6)/MCL (ref 4.2–5.4)
SODIUM SERPL-SCNC: 140 MMOL/L (ref 136–145)
WBC # SPEC AUTO: 5.5 X10(3)/MCL (ref 4.5–11.5)

## 2019-03-09 LAB — RAPID GROUP A STREP (OHS): NEGATIVE

## 2019-04-01 ENCOUNTER — HISTORICAL (OUTPATIENT)
Dept: INFUSION THERAPY | Facility: HOSPITAL | Age: 69
End: 2019-04-01

## 2019-05-30 ENCOUNTER — HISTORICAL (OUTPATIENT)
Dept: HEMATOLOGY/ONCOLOGY | Facility: CLINIC | Age: 69
End: 2019-05-30

## 2019-05-30 LAB
ABS NEUT (OLG): 3.69 X10(3)/MCL (ref 2.1–9.2)
ALBUMIN SERPL-MCNC: 3.2 GM/DL (ref 3.4–5)
ALBUMIN/GLOB SERPL: 0.8 RATIO (ref 1.1–2)
ALP SERPL-CCNC: 108 UNIT/L (ref 38–126)
ALT SERPL-CCNC: 20 UNIT/L (ref 12–78)
AST SERPL-CCNC: 14 UNIT/L (ref 15–37)
BASOPHILS # BLD AUTO: 0 X10(3)/MCL (ref 0–0.2)
BASOPHILS NFR BLD AUTO: 0.5 %
BILIRUB SERPL-MCNC: 0.4 MG/DL (ref 0.2–1)
BILIRUBIN DIRECT+TOT PNL SERPL-MCNC: 0.1 MG/DL (ref 0–0.5)
BILIRUBIN DIRECT+TOT PNL SERPL-MCNC: 0.3 MG/DL (ref 0–0.8)
BUN SERPL-MCNC: 25 MG/DL (ref 7–18)
CALCIUM SERPL-MCNC: 8.6 MG/DL (ref 8.5–10.1)
CEA SERPL-MCNC: 1.2 NG/ML (ref 0–3)
CHLORIDE SERPL-SCNC: 107 MMOL/L (ref 98–107)
CO2 SERPL-SCNC: 25 MMOL/L (ref 21–32)
CREAT SERPL-MCNC: 1.13 MG/DL (ref 0.55–1.02)
EOSINOPHIL # BLD AUTO: 0.2 X10(3)/MCL (ref 0–0.9)
EOSINOPHIL NFR BLD AUTO: 3 %
ERYTHROCYTE [DISTWIDTH] IN BLOOD BY AUTOMATED COUNT: 14.3 % (ref 11.5–17)
GLOBULIN SER-MCNC: 4.1 GM/DL (ref 2.4–3.5)
GLUCOSE SERPL-MCNC: 187 MG/DL (ref 74–106)
HCT VFR BLD AUTO: 34.3 % (ref 37–47)
HGB BLD-MCNC: 10.4 GM/DL (ref 12–16)
LYMPHOCYTES # BLD AUTO: 1.8 X10(3)/MCL (ref 0.6–4.6)
LYMPHOCYTES NFR BLD AUTO: 28.7 %
MCH RBC QN AUTO: 27.2 PG (ref 27–31)
MCHC RBC AUTO-ENTMCNC: 30.3 GM/DL (ref 33–36)
MCV RBC AUTO: 89.8 FL (ref 80–94)
MONOCYTES # BLD AUTO: 0.6 X10(3)/MCL (ref 0.1–1.3)
MONOCYTES NFR BLD AUTO: 8.8 %
NEUTROPHILS # BLD AUTO: 3.7 X10(3)/MCL (ref 2.1–9.2)
NEUTROPHILS NFR BLD AUTO: 58.7 %
PLATELET # BLD AUTO: 232 X10(3)/MCL (ref 130–400)
PMV BLD AUTO: 9.9 FL (ref 9.4–12.4)
POTASSIUM SERPL-SCNC: 4.4 MMOL/L (ref 3.5–5.1)
PROT SERPL-MCNC: 7.3 GM/DL (ref 6.4–8.2)
RBC # BLD AUTO: 3.82 X10(6)/MCL (ref 4.2–5.4)
SODIUM SERPL-SCNC: 139 MMOL/L (ref 136–145)
WBC # SPEC AUTO: 6.3 X10(3)/MCL (ref 4.5–11.5)

## 2019-05-31 ENCOUNTER — HISTORICAL (OUTPATIENT)
Dept: ADMINISTRATIVE | Facility: HOSPITAL | Age: 69
End: 2019-05-31

## 2019-06-11 ENCOUNTER — HISTORICAL (OUTPATIENT)
Dept: INFUSION THERAPY | Facility: HOSPITAL | Age: 69
End: 2019-06-11

## 2019-08-28 ENCOUNTER — HISTORICAL (OUTPATIENT)
Dept: INFUSION THERAPY | Facility: HOSPITAL | Age: 69
End: 2019-08-28

## 2019-08-28 LAB
ABS NEUT (OLG): 4.18 X10(3)/MCL (ref 2.1–9.2)
ALBUMIN SERPL-MCNC: 3.5 GM/DL (ref 3.4–5)
ALBUMIN/GLOB SERPL: 0.9 {RATIO}
ALP SERPL-CCNC: 106 UNIT/L (ref 38–126)
ALT SERPL-CCNC: 20 UNIT/L (ref 12–78)
AST SERPL-CCNC: 16 UNIT/L (ref 15–37)
BASOPHILS # BLD AUTO: 0 X10(3)/MCL (ref 0–0.2)
BASOPHILS NFR BLD AUTO: 0.6 %
BILIRUB SERPL-MCNC: 0.7 MG/DL (ref 0.2–1)
BILIRUBIN DIRECT+TOT PNL SERPL-MCNC: 0.1 MG/DL (ref 0–0.2)
BILIRUBIN DIRECT+TOT PNL SERPL-MCNC: 0.6 MG/DL (ref 0–0.8)
BUN SERPL-MCNC: 26 MG/DL (ref 7–18)
CALCIUM SERPL-MCNC: 9.5 MG/DL (ref 8.5–10.1)
CEA SERPL-MCNC: 1 NG/ML (ref 0–3)
CHLORIDE SERPL-SCNC: 106 MMOL/L (ref 98–107)
CO2 SERPL-SCNC: 27 MMOL/L (ref 21–32)
CREAT SERPL-MCNC: 1.08 MG/DL (ref 0.55–1.02)
EOSINOPHIL # BLD AUTO: 0.2 X10(3)/MCL (ref 0–0.9)
EOSINOPHIL NFR BLD AUTO: 2.7 %
ERYTHROCYTE [DISTWIDTH] IN BLOOD BY AUTOMATED COUNT: 14.1 % (ref 11.5–17)
GLOBULIN SER-MCNC: 3.9 GM/DL (ref 2.4–3.5)
GLUCOSE SERPL-MCNC: 90 MG/DL (ref 74–106)
HCT VFR BLD AUTO: 36 % (ref 37–47)
HGB BLD-MCNC: 11 GM/DL (ref 12–16)
LYMPHOCYTES # BLD AUTO: 1.5 X10(3)/MCL (ref 0.6–4.6)
LYMPHOCYTES NFR BLD AUTO: 22.2 %
MCH RBC QN AUTO: 26.8 PG (ref 27–31)
MCHC RBC AUTO-ENTMCNC: 30.6 GM/DL (ref 33–36)
MCV RBC AUTO: 87.6 FL (ref 80–94)
MONOCYTES # BLD AUTO: 0.7 X10(3)/MCL (ref 0.1–1.3)
MONOCYTES NFR BLD AUTO: 11 %
NEUTROPHILS # BLD AUTO: 4.2 X10(3)/MCL (ref 2.1–9.2)
NEUTROPHILS NFR BLD AUTO: 63.2 %
PLATELET # BLD AUTO: 284 X10(3)/MCL (ref 130–400)
PMV BLD AUTO: 9.1 FL (ref 9.4–12.4)
POTASSIUM SERPL-SCNC: 4.6 MMOL/L (ref 3.5–5.1)
PROT SERPL-MCNC: 7.4 GM/DL (ref 6.4–8.2)
RBC # BLD AUTO: 4.11 X10(6)/MCL (ref 4.2–5.4)
SODIUM SERPL-SCNC: 140 MMOL/L (ref 136–145)
WBC # SPEC AUTO: 6.6 X10(3)/MCL (ref 4.5–11.5)

## 2019-12-02 ENCOUNTER — HISTORICAL (OUTPATIENT)
Dept: HEMATOLOGY/ONCOLOGY | Facility: CLINIC | Age: 69
End: 2019-12-02

## 2019-12-02 LAB
ABS NEUT (OLG): 3.19 X10(3)/MCL (ref 2.1–9.2)
ALBUMIN SERPL-MCNC: 3.3 GM/DL (ref 3.4–5)
ALBUMIN/GLOB SERPL: 0.8 RATIO (ref 1.1–2)
ALP SERPL-CCNC: 99 UNIT/L (ref 38–126)
ALT SERPL-CCNC: 25 UNIT/L (ref 12–78)
AST SERPL-CCNC: 27 UNIT/L (ref 15–37)
BASOPHILS # BLD AUTO: 0 X10(3)/MCL (ref 0–0.2)
BASOPHILS NFR BLD AUTO: 0.3 %
BILIRUB SERPL-MCNC: 0.3 MG/DL (ref 0.2–1)
BILIRUBIN DIRECT+TOT PNL SERPL-MCNC: 0.1 MG/DL (ref 0–0.5)
BILIRUBIN DIRECT+TOT PNL SERPL-MCNC: 0.2 MG/DL (ref 0–0.8)
BUN SERPL-MCNC: 25 MG/DL (ref 7–18)
CALCIUM SERPL-MCNC: 8.7 MG/DL (ref 8.5–10.1)
CEA SERPL-MCNC: 0.9 NG/ML (ref 0–3)
CHLORIDE SERPL-SCNC: 108 MMOL/L (ref 98–107)
CO2 SERPL-SCNC: 24 MMOL/L (ref 21–32)
CREAT SERPL-MCNC: 0.89 MG/DL (ref 0.55–1.02)
EOSINOPHIL # BLD AUTO: 0.2 X10(3)/MCL (ref 0–0.9)
EOSINOPHIL NFR BLD AUTO: 3.5 %
ERYTHROCYTE [DISTWIDTH] IN BLOOD BY AUTOMATED COUNT: 14.2 % (ref 11.5–17)
GLOBULIN SER-MCNC: 4.4 GM/DL (ref 2.4–3.5)
GLUCOSE SERPL-MCNC: 99 MG/DL (ref 74–106)
HCT VFR BLD AUTO: 35.4 % (ref 37–47)
HGB BLD-MCNC: 10.8 GM/DL (ref 12–16)
LYMPHOCYTES # BLD AUTO: 2.2 X10(3)/MCL (ref 0.6–4.6)
LYMPHOCYTES NFR BLD AUTO: 34.2 %
MCH RBC QN AUTO: 27.6 PG (ref 27–31)
MCHC RBC AUTO-ENTMCNC: 30.5 GM/DL (ref 33–36)
MCV RBC AUTO: 90.3 FL (ref 80–94)
MONOCYTES # BLD AUTO: 0.7 X10(3)/MCL (ref 0.1–1.3)
MONOCYTES NFR BLD AUTO: 10.7 %
NEUTROPHILS # BLD AUTO: 3.2 X10(3)/MCL (ref 2.1–9.2)
NEUTROPHILS NFR BLD AUTO: 50.8 %
PLATELET # BLD AUTO: 207 X10(3)/MCL (ref 130–400)
PMV BLD AUTO: 9.3 FL (ref 9.4–12.4)
POC CREATININE: 1 MG/DL (ref 0.6–1.3)
POTASSIUM SERPL-SCNC: 4.5 MMOL/L (ref 3.5–5.1)
PROT SERPL-MCNC: 7.7 GM/DL (ref 6.4–8.2)
RBC # BLD AUTO: 3.92 X10(6)/MCL (ref 4.2–5.4)
SODIUM SERPL-SCNC: 139 MMOL/L (ref 136–145)
WBC # SPEC AUTO: 6.3 X10(3)/MCL (ref 4.5–11.5)

## 2019-12-09 ENCOUNTER — HISTORICAL (OUTPATIENT)
Dept: INFUSION THERAPY | Facility: HOSPITAL | Age: 69
End: 2019-12-09

## 2020-03-03 ENCOUNTER — HISTORICAL (OUTPATIENT)
Dept: HEMATOLOGY/ONCOLOGY | Facility: CLINIC | Age: 70
End: 2020-03-03

## 2020-03-18 ENCOUNTER — HISTORICAL (OUTPATIENT)
Dept: HEMATOLOGY/ONCOLOGY | Facility: CLINIC | Age: 70
End: 2020-03-18

## 2020-03-18 LAB
ABS NEUT (OLG): 3.4 X10(3)/MCL (ref 2.1–9.2)
ALBUMIN SERPL-MCNC: 3.6 GM/DL (ref 3.4–5)
ALBUMIN/GLOB SERPL: 0.8 RATIO (ref 1.1–2)
ALP SERPL-CCNC: 99 UNIT/L (ref 38–126)
ALT SERPL-CCNC: 23 UNIT/L (ref 12–78)
AST SERPL-CCNC: 17 UNIT/L (ref 15–37)
BASOPHILS # BLD AUTO: 0 X10(3)/MCL (ref 0–0.2)
BASOPHILS NFR BLD AUTO: 0.5 %
BILIRUB SERPL-MCNC: 0.3 MG/DL (ref 0.2–1)
BILIRUBIN DIRECT+TOT PNL SERPL-MCNC: 0.1 MG/DL (ref 0–0.5)
BILIRUBIN DIRECT+TOT PNL SERPL-MCNC: 0.2 MG/DL (ref 0–0.8)
BUN SERPL-MCNC: 22 MG/DL (ref 7–18)
CALCIUM SERPL-MCNC: 9.7 MG/DL (ref 8.5–10.1)
CEA SERPL-MCNC: 0.8 NG/ML (ref 0–3)
CHLORIDE SERPL-SCNC: 109 MMOL/L (ref 98–107)
CO2 SERPL-SCNC: 26 MMOL/L (ref 21–32)
CREAT SERPL-MCNC: 1.02 MG/DL (ref 0.55–1.02)
EOSINOPHIL # BLD AUTO: 0.1 X10(3)/MCL (ref 0–0.9)
EOSINOPHIL NFR BLD AUTO: 2.1 %
EOSINOPHIL NFR BLD MANUAL: 2 % (ref 0–8)
ERYTHROCYTE [DISTWIDTH] IN BLOOD BY AUTOMATED COUNT: 14.1 % (ref 11.5–17)
FERRITIN SERPL-MCNC: 36.8 NG/ML (ref 8–388)
FOLATE SERPL-MCNC: 33 NG/ML (ref 3.1–17.5)
GLOBULIN SER-MCNC: 4.3 GM/DL (ref 2.4–3.5)
GLUCOSE SERPL-MCNC: 91 MG/DL (ref 74–106)
HCT VFR BLD AUTO: 35.9 % (ref 37–47)
HGB BLD-MCNC: 11 GM/DL (ref 12–16)
IRON SATN MFR SERPL: 15.8 % (ref 20–50)
IRON SERPL-MCNC: 61 MCG/DL (ref 50–175)
LDH SERPL-CCNC: 204 UNIT/L (ref 84–246)
LYMPHOCYTES # BLD AUTO: 2 X10(3)/MCL (ref 0.6–4.6)
LYMPHOCYTES NFR BLD AUTO: 33 %
LYMPHOCYTES NFR BLD MANUAL: 24 % (ref 13–40)
MCH RBC QN AUTO: 27.3 PG (ref 27–31)
MCHC RBC AUTO-ENTMCNC: 30.6 GM/DL (ref 33–36)
MCV RBC AUTO: 89.1 FL (ref 80–94)
MONOCYTES # BLD AUTO: 0.6 X10(3)/MCL (ref 0.1–1.3)
MONOCYTES NFR BLD AUTO: 8.9 %
MONOCYTES NFR BLD MANUAL: 8 % (ref 2–11)
NEUTROPHILS # BLD AUTO: 3.4 X10(3)/MCL (ref 2.1–9.2)
NEUTROPHILS NFR BLD AUTO: 55.2 %
NEUTROPHILS NFR BLD MANUAL: 66 % (ref 47–80)
PLATELET # BLD AUTO: 252 X10(3)/MCL (ref 130–400)
PLATELET # BLD EST: ADEQUATE 10*3/UL
PMV BLD AUTO: 9.8 FL (ref 9.4–12.4)
POTASSIUM SERPL-SCNC: 4.7 MMOL/L (ref 3.5–5.1)
PROT SERPL-MCNC: 7.9 GM/DL (ref 6.4–8.2)
RBC # BLD AUTO: 4.03 X10(6)/MCL (ref 4.2–5.4)
RBC MORPH BLD: NORMAL
RET# (OHS): 0.06 X10^6/ML (ref 0.02–0.08)
RETICULOCYTE COUNT AUTOMATED (OLG): 1.4 % (ref 1.1–2.1)
RHEUMATOID FACT SERPL-ACNC: <10 IU/ML (ref 0–15)
SODIUM SERPL-SCNC: 140 MMOL/L (ref 136–145)
TIBC SERPL-MCNC: 385 MCG/DL (ref 250–450)
TRANSFERRIN SERPL-MCNC: 292 MG/DL (ref 200–360)
TSH SERPL-ACNC: 2.39 MIU/L (ref 0.36–3.74)
VIT B12 SERPL-MCNC: 1494 PG/ML (ref 193–986)
WBC # SPEC AUTO: 6.2 X10(3)/MCL (ref 4.5–11.5)

## 2020-05-07 ENCOUNTER — HISTORICAL (OUTPATIENT)
Dept: INFUSION THERAPY | Facility: HOSPITAL | Age: 70
End: 2020-05-07

## 2020-06-19 ENCOUNTER — HISTORICAL (OUTPATIENT)
Dept: RADIOLOGY | Facility: HOSPITAL | Age: 70
End: 2020-06-19

## 2020-06-19 LAB — POC CREATININE: 0.8 MG/DL (ref 0.6–1.3)

## 2020-06-22 ENCOUNTER — HISTORICAL (OUTPATIENT)
Dept: HEMATOLOGY/ONCOLOGY | Facility: CLINIC | Age: 70
End: 2020-06-22

## 2020-06-22 LAB
ABS NEUT (OLG): 3.2 X10(3)/MCL (ref 2.1–9.2)
ALBUMIN SERPL-MCNC: 3.7 GM/DL (ref 3.4–5)
ALBUMIN/GLOB SERPL: 0.9 RATIO (ref 1.1–2)
ALP SERPL-CCNC: 89 UNIT/L (ref 40–150)
ALT SERPL-CCNC: 15 UNIT/L (ref 0–55)
AST SERPL-CCNC: 16 UNIT/L (ref 5–34)
BASOPHILS # BLD AUTO: 0 X10(3)/MCL (ref 0–0.2)
BASOPHILS NFR BLD AUTO: 0.7 %
BILIRUB SERPL-MCNC: 0.3 MG/DL
BILIRUBIN DIRECT+TOT PNL SERPL-MCNC: 0.1 MG/DL (ref 0–0.5)
BILIRUBIN DIRECT+TOT PNL SERPL-MCNC: 0.2 MG/DL (ref 0–0.8)
BUN SERPL-MCNC: 19.1 MG/DL (ref 9.8–20.1)
CALCIUM SERPL-MCNC: 9.4 MG/DL (ref 8.4–10.2)
CEA SERPL-MCNC: <1.73 NG/ML (ref 0–3)
CHLORIDE SERPL-SCNC: 105 MMOL/L (ref 98–107)
CO2 SERPL-SCNC: 25 MMOL/L (ref 23–31)
CREAT SERPL-MCNC: 1.13 MG/DL (ref 0.55–1.02)
EOSINOPHIL # BLD AUTO: 0.2 X10(3)/MCL (ref 0–0.9)
EOSINOPHIL NFR BLD AUTO: 3.5 %
ERYTHROCYTE [DISTWIDTH] IN BLOOD BY AUTOMATED COUNT: 13.4 % (ref 11.5–17)
FERRITIN SERPL-MCNC: 38.77 NG/ML (ref 4.63–204)
GLOBULIN SER-MCNC: 4 GM/DL (ref 2.4–3.5)
GLUCOSE SERPL-MCNC: 130 MG/DL (ref 82–115)
HCT VFR BLD AUTO: 37.1 % (ref 37–47)
HGB BLD-MCNC: 11.5 GM/DL (ref 12–16)
IRON SATN MFR SERPL: 23 % (ref 20–50)
IRON SERPL-MCNC: 71 UG/DL (ref 50–170)
LYMPHOCYTES # BLD AUTO: 2.1 X10(3)/MCL (ref 0.6–4.6)
LYMPHOCYTES NFR BLD AUTO: 34.2 %
MCH RBC QN AUTO: 27.8 PG (ref 27–31)
MCHC RBC AUTO-ENTMCNC: 31 GM/DL (ref 33–36)
MCV RBC AUTO: 89.6 FL (ref 80–94)
MONOCYTES # BLD AUTO: 0.5 X10(3)/MCL (ref 0.1–1.3)
MONOCYTES NFR BLD AUTO: 8.6 %
NEUTROPHILS # BLD AUTO: 3.2 X10(3)/MCL (ref 2.1–9.2)
NEUTROPHILS NFR BLD AUTO: 52.7 %
PLATELET # BLD AUTO: 302 X10(3)/MCL (ref 130–400)
PMV BLD AUTO: 8.8 FL (ref 9.4–12.4)
POTASSIUM SERPL-SCNC: 4.5 MMOL/L (ref 3.5–5.1)
PROT SERPL-MCNC: 7.7 GM/DL (ref 5.8–7.6)
RBC # BLD AUTO: 4.14 X10(6)/MCL (ref 4.2–5.4)
SODIUM SERPL-SCNC: 141 MMOL/L (ref 136–145)
TIBC SERPL-MCNC: 239 UG/DL (ref 70–310)
TIBC SERPL-MCNC: 310 UG/DL (ref 250–450)
TRANSFERRIN SERPL-MCNC: 264 MG/DL (ref 173–360)
WBC # SPEC AUTO: 6.1 X10(3)/MCL (ref 4.5–11.5)

## 2020-07-30 ENCOUNTER — HISTORICAL (OUTPATIENT)
Dept: INFUSION THERAPY | Facility: HOSPITAL | Age: 70
End: 2020-07-30

## 2020-10-15 ENCOUNTER — HISTORICAL (OUTPATIENT)
Dept: INFUSION THERAPY | Facility: HOSPITAL | Age: 70
End: 2020-10-15

## 2020-12-15 ENCOUNTER — HISTORICAL (OUTPATIENT)
Dept: HEMATOLOGY/ONCOLOGY | Facility: CLINIC | Age: 70
End: 2020-12-15

## 2020-12-15 LAB
ABS NEUT (OLG): 2.78 X10(3)/MCL (ref 2.1–9.2)
ALBUMIN SERPL-MCNC: 3.7 GM/DL (ref 3.4–4.8)
ALBUMIN/GLOB SERPL: 1 RATIO (ref 1.1–2)
ALP SERPL-CCNC: 89 UNIT/L (ref 40–150)
ALT SERPL-CCNC: 17 UNIT/L (ref 0–55)
AST SERPL-CCNC: 17 UNIT/L (ref 5–34)
BASOPHILS # BLD AUTO: 0 X10(3)/MCL (ref 0–0.2)
BASOPHILS NFR BLD AUTO: 0.5 %
BILIRUB SERPL-MCNC: 0.5 MG/DL
BILIRUBIN DIRECT+TOT PNL SERPL-MCNC: 0.1 MG/DL (ref 0–0.5)
BILIRUBIN DIRECT+TOT PNL SERPL-MCNC: 0.4 MG/DL (ref 0–0.8)
BUN SERPL-MCNC: 23 MG/DL (ref 9.8–20.1)
CALCIUM SERPL-MCNC: 9.2 MG/DL (ref 8.4–10.2)
CEA SERPL-MCNC: <1.73 NG/ML (ref 0–3)
CHLORIDE SERPL-SCNC: 105 MMOL/L (ref 98–107)
CO2 SERPL-SCNC: 29 MMOL/L (ref 23–31)
CREAT SERPL-MCNC: 1 MG/DL (ref 0.55–1.02)
EOSINOPHIL # BLD AUTO: 0.2 X10(3)/MCL (ref 0–0.9)
EOSINOPHIL NFR BLD AUTO: 4.2 %
ERYTHROCYTE [DISTWIDTH] IN BLOOD BY AUTOMATED COUNT: 13.3 % (ref 11.5–17)
GLOBULIN SER-MCNC: 3.8 GM/DL (ref 2.4–3.5)
GLUCOSE SERPL-MCNC: 100 MG/DL (ref 82–115)
HCT VFR BLD AUTO: 34.4 % (ref 37–47)
HGB BLD-MCNC: 11.1 GM/DL (ref 12–16)
LYMPHOCYTES # BLD AUTO: 2 X10(3)/MCL (ref 0.6–4.6)
LYMPHOCYTES NFR BLD AUTO: 34.9 %
MCH RBC QN AUTO: 29.1 PG (ref 27–31)
MCHC RBC AUTO-ENTMCNC: 32.3 GM/DL (ref 33–36)
MCV RBC AUTO: 90.3 FL (ref 80–94)
MONOCYTES # BLD AUTO: 0.6 X10(3)/MCL (ref 0.1–1.3)
MONOCYTES NFR BLD AUTO: 10.8 %
NEUTROPHILS # BLD AUTO: 2.8 X10(3)/MCL (ref 2.1–9.2)
NEUTROPHILS NFR BLD AUTO: 49.2 %
PLATELET # BLD AUTO: 286 X10(3)/MCL (ref 130–400)
PMV BLD AUTO: 8.7 FL (ref 9.4–12.4)
POC CREATININE: 1.1 MG/DL (ref 0.6–1.3)
POTASSIUM SERPL-SCNC: 4.7 MMOL/L (ref 3.5–5.1)
PROT SERPL-MCNC: 7.5 GM/DL (ref 5.8–7.6)
RBC # BLD AUTO: 3.81 X10(6)/MCL (ref 4.2–5.4)
SODIUM SERPL-SCNC: 142 MMOL/L (ref 136–145)
WBC # SPEC AUTO: 5.6 X10(3)/MCL (ref 4.5–11.5)

## 2021-03-22 LAB
ALBUMIN SERPL-MCNC: 3.8 GM/DL (ref 3.4–4.8)
ALBUMIN/GLOB SERPL: 1.1 RATIO (ref 1.1–2)
ALP SERPL-CCNC: 97 UNIT/L (ref 40–150)
ALT SERPL-CCNC: 16 UNIT/L (ref 0–55)
AST SERPL-CCNC: 17 UNIT/L (ref 5–34)
BILIRUB SERPL-MCNC: 0.5 MG/DL
BILIRUBIN DIRECT+TOT PNL SERPL-MCNC: 0.2 MG/DL (ref 0–0.5)
BILIRUBIN DIRECT+TOT PNL SERPL-MCNC: 0.3 MG/DL (ref 0–0.8)
BUN SERPL-MCNC: 19.8 MG/DL (ref 9.8–20.1)
CALCIUM SERPL-MCNC: 9 MG/DL (ref 8.4–10.2)
CHLORIDE SERPL-SCNC: 104 MMOL/L (ref 98–107)
CHOLEST SERPL-MCNC: 186 MG/DL
CHOLEST/HDLC SERPL: 4 {RATIO} (ref 0–5)
CO2 SERPL-SCNC: 27 MMOL/L (ref 23–31)
CREAT SERPL-MCNC: 0.82 MG/DL (ref 0.55–1.02)
ERYTHROCYTE [DISTWIDTH] IN BLOOD BY AUTOMATED COUNT: 13.4 % (ref 11.5–17)
EST. AVERAGE GLUCOSE BLD GHB EST-MCNC: 148.5 MG/DL
GLOBULIN SER-MCNC: 3.5 GM/DL (ref 2.4–3.5)
GLUCOSE SERPL-MCNC: 105 MG/DL (ref 82–115)
HBA1C MFR BLD: 6.8 %
HCT VFR BLD AUTO: 36.5 % (ref 37–47)
HDLC SERPL-MCNC: 48 MG/DL (ref 35–60)
HGB BLD-MCNC: 11.3 GM/DL (ref 12–16)
INR PPP: 1 (ref 0–1.3)
LDLC SERPL CALC-MCNC: 120 MG/DL (ref 50–140)
MAGNESIUM SERPL-MCNC: 2.3 MG/DL (ref 1.6–2.6)
MCH RBC QN AUTO: 28.2 PG (ref 27–31)
MCHC RBC AUTO-ENTMCNC: 31 GM/DL (ref 33–36)
MCV RBC AUTO: 91 FL (ref 80–94)
PLATELET # BLD AUTO: 287 X10(3)/MCL (ref 130–400)
PMV BLD AUTO: 10 FL (ref 9.4–12.4)
POTASSIUM SERPL-SCNC: 4.4 MMOL/L (ref 3.5–5.1)
PROT SERPL-MCNC: 7.3 GM/DL (ref 5.8–7.6)
PROTHROMBIN TIME: 13.4 SECOND(S) (ref 11.1–13.7)
RBC # BLD AUTO: 4.01 X10(6)/MCL (ref 4.2–5.4)
SODIUM SERPL-SCNC: 139 MMOL/L (ref 136–145)
TRIGL SERPL-MCNC: 92 MG/DL (ref 37–140)
TSH SERPL-ACNC: 1.31 UIU/ML (ref 0.35–4.94)
VLDLC SERPL CALC-MCNC: 18 MG/DL
WBC # SPEC AUTO: 4.7 X10(3)/MCL (ref 4.5–11.5)

## 2021-03-23 ENCOUNTER — HISTORICAL (OUTPATIENT)
Dept: CARDIOLOGY | Facility: HOSPITAL | Age: 71
End: 2021-03-23

## 2021-03-23 LAB — EST CREAT CLEARANCE SER (OHS): 55.71 ML/MIN

## 2021-04-08 ENCOUNTER — HISTORICAL (OUTPATIENT)
Dept: INFUSION THERAPY | Facility: HOSPITAL | Age: 71
End: 2021-04-08

## 2021-05-11 ENCOUNTER — HOSPITAL ENCOUNTER (OUTPATIENT)
Dept: NUTRITION | Facility: HOSPITAL | Age: 71
End: 2021-05-12
Attending: INTERNAL MEDICINE | Admitting: INTERNAL MEDICINE

## 2021-05-11 LAB
ABS NEUT (OLG): 3.46 X10(3)/MCL (ref 2.1–9.2)
ALBUMIN SERPL-MCNC: 3.8 GM/DL (ref 3.4–4.8)
ALBUMIN/GLOB SERPL: 1.1 RATIO (ref 1.1–2)
ALP SERPL-CCNC: 102 UNIT/L (ref 40–150)
ALT SERPL-CCNC: 14 UNIT/L (ref 0–55)
APTT PPP: 42.4 SECOND(S) (ref 23.2–33.7)
AST SERPL-CCNC: 16 UNIT/L (ref 5–34)
BASOPHILS # BLD AUTO: 0 X10(3)/MCL (ref 0–0.2)
BASOPHILS NFR BLD AUTO: 0 %
BILIRUB SERPL-MCNC: 0.5 MG/DL
BILIRUBIN DIRECT+TOT PNL SERPL-MCNC: 0.2 MG/DL (ref 0–0.5)
BILIRUBIN DIRECT+TOT PNL SERPL-MCNC: 0.3 MG/DL (ref 0–0.8)
BUN SERPL-MCNC: 27.7 MG/DL (ref 9.8–20.1)
CALCIUM SERPL-MCNC: 9.4 MG/DL (ref 8.4–10.2)
CHLORIDE SERPL-SCNC: 107 MMOL/L (ref 98–107)
CK MB SERPL-MCNC: 1 NG/ML
CK SERPL-CCNC: 139 U/L (ref 29–168)
CO2 SERPL-SCNC: 24 MMOL/L (ref 23–31)
CREAT SERPL-MCNC: 0.95 MG/DL (ref 0.55–1.02)
EOSINOPHIL # BLD AUTO: 0.1 X10(3)/MCL (ref 0–0.9)
EOSINOPHIL NFR BLD AUTO: 2 %
ERYTHROCYTE [DISTWIDTH] IN BLOOD BY AUTOMATED COUNT: 13.7 % (ref 11.5–17)
GLOBULIN SER-MCNC: 3.5 GM/DL (ref 2.4–3.5)
GLUCOSE SERPL-MCNC: 134 MG/DL (ref 82–115)
HCT VFR BLD AUTO: 34.4 % (ref 37–47)
HGB BLD-MCNC: 10.9 GM/DL (ref 12–16)
INR PPP: 2.2 (ref 0–1.3)
LYMPHOCYTES # BLD AUTO: 1.5 X10(3)/MCL (ref 0.6–4.6)
LYMPHOCYTES NFR BLD AUTO: 26 %
MCH RBC QN AUTO: 28.6 PG (ref 27–31)
MCHC RBC AUTO-ENTMCNC: 31.7 GM/DL (ref 33–36)
MCV RBC AUTO: 90.3 FL (ref 80–94)
MONOCYTES # BLD AUTO: 0.6 X10(3)/MCL (ref 0.1–1.3)
MONOCYTES NFR BLD AUTO: 11 %
NEUTROPHILS # BLD AUTO: 3.46 X10(3)/MCL (ref 2.1–9.2)
NEUTROPHILS NFR BLD AUTO: 60 %
PLATELET # BLD AUTO: 288 X10(3)/MCL (ref 130–400)
PMV BLD AUTO: 9.6 FL (ref 9.4–12.4)
POTASSIUM SERPL-SCNC: 4.2 MMOL/L (ref 3.5–5.1)
PROT SERPL-MCNC: 7.3 GM/DL (ref 5.8–7.6)
PROTHROMBIN TIME: 23.7 SECOND(S) (ref 11.1–13.7)
RBC # BLD AUTO: 3.81 X10(6)/MCL (ref 4.2–5.4)
SODIUM SERPL-SCNC: 138 MMOL/L (ref 136–145)
TROPONIN I SERPL-MCNC: 0.01 NG/ML (ref 0–0.04)
TROPONIN I SERPL-MCNC: <0.01 NG/ML (ref 0–0.04)
WBC # SPEC AUTO: 5.7 X10(3)/MCL (ref 4.5–11.5)

## 2021-05-12 LAB
CK MB SERPL-MCNC: 0.8 NG/ML
CK SERPL-CCNC: 122 U/L (ref 29–168)
TROPONIN I SERPL-MCNC: <0.01 NG/ML (ref 0–0.04)

## 2021-06-07 LAB — BCS RECOMMENDATION EXT: NORMAL

## 2021-06-14 ENCOUNTER — HISTORICAL (OUTPATIENT)
Dept: RADIOLOGY | Facility: HOSPITAL | Age: 71
End: 2021-06-14

## 2021-06-14 LAB — POC CREATININE: 1.1 MG/DL (ref 0.6–1.3)

## 2021-06-17 ENCOUNTER — HISTORICAL (OUTPATIENT)
Dept: ADMINISTRATIVE | Facility: HOSPITAL | Age: 71
End: 2021-06-17

## 2021-06-17 LAB
ABS NEUT (OLG): 4.13 X10(3)/MCL (ref 2.1–9.2)
ALBUMIN SERPL-MCNC: 3.1 GM/DL (ref 3.4–4.8)
ALBUMIN/GLOB SERPL: 0.8 RATIO (ref 1.1–2)
ALP SERPL-CCNC: 94 UNIT/L (ref 40–150)
ALT SERPL-CCNC: 11 UNIT/L (ref 0–55)
AST SERPL-CCNC: 13 UNIT/L (ref 5–34)
BASOPHILS # BLD AUTO: 0 X10(3)/MCL (ref 0–0.2)
BASOPHILS NFR BLD AUTO: 0.6 %
BILIRUB SERPL-MCNC: 0.3 MG/DL
BILIRUBIN DIRECT+TOT PNL SERPL-MCNC: 0.1 MG/DL (ref 0–0.8)
BILIRUBIN DIRECT+TOT PNL SERPL-MCNC: 0.2 MG/DL (ref 0–0.5)
BUN SERPL-MCNC: 20.8 MG/DL (ref 9.8–20.1)
CALCIUM SERPL-MCNC: 9 MG/DL (ref 8.4–10.2)
CHLORIDE SERPL-SCNC: 107 MMOL/L (ref 98–107)
CO2 SERPL-SCNC: 25 MMOL/L (ref 23–31)
CREAT SERPL-MCNC: 1.15 MG/DL (ref 0.55–1.02)
EOSINOPHIL # BLD AUTO: 0.1 X10(3)/MCL (ref 0–0.9)
EOSINOPHIL NFR BLD AUTO: 2 %
ERYTHROCYTE [DISTWIDTH] IN BLOOD BY AUTOMATED COUNT: 13.3 % (ref 11.5–17)
FERRITIN SERPL-MCNC: 43.4 NG/ML (ref 4.63–204)
GLOBULIN SER-MCNC: 4 GM/DL (ref 2.4–3.5)
GLUCOSE SERPL-MCNC: 132 MG/DL (ref 82–115)
HCT VFR BLD AUTO: 30.3 % (ref 37–47)
HGB BLD-MCNC: 9.6 GM/DL (ref 12–16)
IRON SATN MFR SERPL: 16 % (ref 20–50)
IRON SERPL-MCNC: 45 UG/DL (ref 50–170)
LYMPHOCYTES # BLD AUTO: 1.9 X10(3)/MCL (ref 0.6–4.6)
LYMPHOCYTES NFR BLD AUTO: 27.6 %
MCH RBC QN AUTO: 27.7 PG (ref 27–31)
MCHC RBC AUTO-ENTMCNC: 31.7 GM/DL (ref 33–36)
MCV RBC AUTO: 87.3 FL (ref 80–94)
MONOCYTES # BLD AUTO: 0.6 X10(3)/MCL (ref 0.1–1.3)
MONOCYTES NFR BLD AUTO: 8.9 %
NEUTROPHILS # BLD AUTO: 4.1 X10(3)/MCL (ref 2.1–9.2)
NEUTROPHILS NFR BLD AUTO: 60 %
PLATELET # BLD AUTO: 387 X10(3)/MCL (ref 130–400)
PMV BLD AUTO: 9 FL (ref 9.4–12.4)
POTASSIUM SERPL-SCNC: 4.2 MMOL/L (ref 3.5–5.1)
PROT SERPL-MCNC: 7.1 GM/DL (ref 5.8–7.6)
RBC # BLD AUTO: 3.47 X10(6)/MCL (ref 4.2–5.4)
SODIUM SERPL-SCNC: 142 MMOL/L (ref 136–145)
TIBC SERPL-MCNC: 235 UG/DL (ref 70–310)
TIBC SERPL-MCNC: 280 UG/DL (ref 250–450)
TRANSFERRIN SERPL-MCNC: 258 MG/DL (ref 173–360)
WBC # SPEC AUTO: 6.9 X10(3)/MCL (ref 4.5–11.5)

## 2021-07-12 ENCOUNTER — HISTORICAL (OUTPATIENT)
Dept: INFUSION THERAPY | Facility: HOSPITAL | Age: 71
End: 2021-07-12

## 2021-08-03 ENCOUNTER — HISTORICAL (OUTPATIENT)
Dept: HEMATOLOGY/ONCOLOGY | Facility: CLINIC | Age: 71
End: 2021-08-03

## 2021-08-03 LAB
ABS NEUT (OLG): 3.57 X10(3)/MCL (ref 2.1–9.2)
ALBUMIN SERPL-MCNC: 3.1 GM/DL (ref 3.4–4.8)
ALBUMIN/GLOB SERPL: 0.8 RATIO (ref 1.1–2)
ALP SERPL-CCNC: 111 UNIT/L (ref 40–150)
ALT SERPL-CCNC: 16 UNIT/L (ref 0–55)
AST SERPL-CCNC: 14 UNIT/L (ref 5–34)
BASOPHILS # BLD AUTO: 0 X10(3)/MCL (ref 0–0.2)
BASOPHILS NFR BLD AUTO: 0.5 %
BILIRUB SERPL-MCNC: 0.3 MG/DL
BILIRUBIN DIRECT+TOT PNL SERPL-MCNC: 0.1 MG/DL (ref 0–0.5)
BILIRUBIN DIRECT+TOT PNL SERPL-MCNC: 0.2 MG/DL (ref 0–0.8)
BUN SERPL-MCNC: 24.6 MG/DL (ref 9.8–20.1)
CALCIUM SERPL-MCNC: 8.7 MG/DL (ref 8.4–10.2)
CEA SERPL-MCNC: <1.73 NG/ML (ref 0–3)
CHLORIDE SERPL-SCNC: 106 MMOL/L (ref 98–107)
CO2 SERPL-SCNC: 25 MMOL/L (ref 23–31)
CREAT SERPL-MCNC: 1.31 MG/DL (ref 0.55–1.02)
EOSINOPHIL # BLD AUTO: 0.2 X10(3)/MCL (ref 0–0.9)
EOSINOPHIL NFR BLD AUTO: 3 %
ERYTHROCYTE [DISTWIDTH] IN BLOOD BY AUTOMATED COUNT: 13.8 % (ref 11.5–17)
GLOBULIN SER-MCNC: 3.7 GM/DL (ref 2.4–3.5)
GLUCOSE SERPL-MCNC: 129 MG/DL (ref 82–115)
HCT VFR BLD AUTO: 31.9 % (ref 37–47)
HGB BLD-MCNC: 9.9 GM/DL (ref 12–16)
LYMPHOCYTES # BLD AUTO: 2 X10(3)/MCL (ref 0.6–4.6)
LYMPHOCYTES NFR BLD AUTO: 30.5 %
MCH RBC QN AUTO: 27.3 PG (ref 27–31)
MCHC RBC AUTO-ENTMCNC: 31 GM/DL (ref 33–36)
MCV RBC AUTO: 87.9 FL (ref 80–94)
MONOCYTES # BLD AUTO: 0.7 X10(3)/MCL (ref 0.1–1.3)
MONOCYTES NFR BLD AUTO: 11.2 %
NEUTROPHILS # BLD AUTO: 3.6 X10(3)/MCL (ref 2.1–9.2)
NEUTROPHILS NFR BLD AUTO: 54.3 %
PLATELET # BLD AUTO: 310 X10(3)/MCL (ref 130–400)
PMV BLD AUTO: 9.4 FL (ref 9.4–12.4)
POTASSIUM SERPL-SCNC: 4.7 MMOL/L (ref 3.5–5.1)
PROT SERPL-MCNC: 6.8 GM/DL (ref 5.8–7.6)
RBC # BLD AUTO: 3.63 X10(6)/MCL (ref 4.2–5.4)
SODIUM SERPL-SCNC: 141 MMOL/L (ref 136–145)
WBC # SPEC AUTO: 6.6 X10(3)/MCL (ref 4.5–11.5)

## 2021-11-08 ENCOUNTER — HISTORICAL (OUTPATIENT)
Dept: INFUSION THERAPY | Facility: HOSPITAL | Age: 71
End: 2021-11-08

## 2021-11-08 LAB
ABS NEUT (OLG): 3.87 X10(3)/MCL (ref 2.1–9.2)
ALBUMIN SERPL-MCNC: 3.5 GM/DL (ref 3.4–4.8)
ALBUMIN/GLOB SERPL: 0.9 RATIO (ref 1.1–2)
ALP SERPL-CCNC: 96 UNIT/L (ref 40–150)
ALT SERPL-CCNC: 14 UNIT/L (ref 0–55)
AST SERPL-CCNC: 15 UNIT/L (ref 5–34)
BASOPHILS # BLD AUTO: 0 X10(3)/MCL (ref 0–0.2)
BASOPHILS NFR BLD AUTO: 0.5 %
BILIRUB SERPL-MCNC: 0.4 MG/DL
BILIRUBIN DIRECT+TOT PNL SERPL-MCNC: 0.1 MG/DL (ref 0–0.5)
BILIRUBIN DIRECT+TOT PNL SERPL-MCNC: 0.3 MG/DL (ref 0–0.8)
BUN SERPL-MCNC: 25.4 MG/DL (ref 9.8–20.1)
CALCIUM SERPL-MCNC: 9.4 MG/DL (ref 8.7–10.5)
CEA SERPL-MCNC: <1.73 NG/ML (ref 0–3)
CHLORIDE SERPL-SCNC: 107 MMOL/L (ref 98–107)
CO2 SERPL-SCNC: 24 MMOL/L (ref 23–31)
CREAT SERPL-MCNC: 1.1 MG/DL (ref 0.55–1.02)
EOSINOPHIL # BLD AUTO: 0.2 X10(3)/MCL (ref 0–0.9)
EOSINOPHIL NFR BLD AUTO: 3 %
ERYTHROCYTE [DISTWIDTH] IN BLOOD BY AUTOMATED COUNT: 14.5 % (ref 11.5–17)
GLOBULIN SER-MCNC: 3.9 GM/DL (ref 2.4–3.5)
GLUCOSE SERPL-MCNC: 159 MG/DL (ref 82–115)
HCT VFR BLD AUTO: 33.4 % (ref 37–47)
HGB BLD-MCNC: 10.3 GM/DL (ref 12–16)
LYMPHOCYTES # BLD AUTO: 1.8 X10(3)/MCL (ref 0.6–4.6)
LYMPHOCYTES NFR BLD AUTO: 28 %
MCH RBC QN AUTO: 27.5 PG (ref 27–31)
MCHC RBC AUTO-ENTMCNC: 30.8 GM/DL (ref 33–36)
MCV RBC AUTO: 89.1 FL (ref 80–94)
MONOCYTES # BLD AUTO: 0.5 X10(3)/MCL (ref 0.1–1.3)
MONOCYTES NFR BLD AUTO: 7.8 %
NEUTROPHILS # BLD AUTO: 3.9 X10(3)/MCL (ref 2.1–9.2)
NEUTROPHILS NFR BLD AUTO: 60.4 %
PLATELET # BLD AUTO: 283 X10(3)/MCL (ref 130–400)
PMV BLD AUTO: 9.3 FL (ref 9.4–12.4)
POTASSIUM SERPL-SCNC: 4.3 MMOL/L (ref 3.5–5.1)
PROT SERPL-MCNC: 7.4 GM/DL (ref 5.8–7.6)
RBC # BLD AUTO: 3.75 X10(6)/MCL (ref 4.2–5.4)
SODIUM SERPL-SCNC: 141 MMOL/L (ref 136–145)
WBC # SPEC AUTO: 6.4 X10(3)/MCL (ref 4.5–11.5)

## 2021-12-07 ENCOUNTER — HISTORICAL (OUTPATIENT)
Dept: RADIOLOGY | Facility: HOSPITAL | Age: 71
End: 2021-12-07

## 2022-02-08 ENCOUNTER — HISTORICAL (OUTPATIENT)
Dept: INFUSION THERAPY | Facility: HOSPITAL | Age: 72
End: 2022-02-08

## 2022-04-10 ENCOUNTER — HISTORICAL (OUTPATIENT)
Dept: ADMINISTRATIVE | Facility: HOSPITAL | Age: 72
End: 2022-04-10
Payer: MEDICARE

## 2022-04-26 VITALS
HEIGHT: 65 IN | BODY MASS INDEX: 43.89 KG/M2 | WEIGHT: 263.44 LBS | OXYGEN SATURATION: 95 % | DIASTOLIC BLOOD PRESSURE: 79 MMHG | SYSTOLIC BLOOD PRESSURE: 127 MMHG

## 2022-04-29 NOTE — OP NOTE
DATE OF SURGERY:    03/23/2021    SURGEON:  Cornelius Herron DO    PREOPERATIVE DIAGNOSES:    1. Chronic peripheral edema.  2. Morbid obesity.  3. Intermittent claudication symptomatology affecting the bilateral lower extremities.  4. Venous stasis symptomatology affecting the bilateral lower extremities.  5. Restless legs syndrome.    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    PROCEDURE:    1. Inferior vena cava and selective bilateral extremity venography with intravascular ultrasonography.  2. Simultaneous percutaneous transluminal venoplasty/stenting x2 to the bilateral iliac veins with postprocedure intravascular ultrasonography, and this was performed via the bilateral femoral veins.    COMPLICATIONS:  None.    ESTIMATED BLOOD LOSS:  None.    CONTRAST UTILIZED:  40 cc heparin dose 3000 units.    DESCRIPTION OF PROCEDURE:  Mrs. Mae was brought to the cardiac cath lab today for above-outlined procedures.  Informed consent was obtained.  Using ultrasound fluoroscopic guidance, we accessed the bilateral femoral veins and positioned 8-Malian sheaths, through which we proceeded with inferior vena cava and selective bilateral extremity venography with intravascular ultrasonography.  The patient was found to have significant compression of the bilateral common and external iliac veins.  In light of these findings, we proceeded with percutaneous transluminal venoplasty of the bilateral iliac veins.  We pre-closed our access sites in the femoral vein with a flow ProGlide closure device system and then upsized to 10-Malian sheaths.  We then simultaneously deployed 20 x 80 mm Wallstents in the bilateral common iliac veins, approximating these stents in the distal inferior vena cava.  The right external iliac vein was then treated with an overlapping 20 x 55 mm Wallstent, and the left external iliac vein was then treated with an overlapping 20 x 80 mm Mer Stent.  All  the stents were post dilated with 16 mm balloons and subsequent venogram and intravascular ultrasonography showed well-opposed bilateral common and external iliac vein stents, and widely patent iliac veins and inferior vena cava.  The procedure was     completed successfully.  The access site was closed using the ProGlide closure device system and the patient left the cardiac cath lab in stable condition.        ______________________________  Cornelius Herron DO    CT/US  DD:  03/23/2021  Time:  03:57PM  DT:  03/23/2021  Time:  06:05PM  Job #:  [id number]

## 2022-04-29 NOTE — ED PROVIDER NOTES
"   Patient:   Osiris Mae            MRN: [id number]            FIN: [id number]               Age:   70 years     Sex:  Female     :  1950   Associated Diagnoses:   Chest pain; DM - Diabetes mellitus; Hypertension; Morbid obesity; Vein compression   Author:   Chris Ca      Basic Information   Time seen: Date & time 2021 11:24:00.   History source: Patient.   Arrival mode: Private vehicle, walking.   History limitation: None.   Additional information: Patient's physician(s): Cornelius Herron DO      History of Present Illness   The patient presents with chest pain, Patient states chest pain that radiates to her UEs. denies any SOB (abearb, NP).  and     I, Dr. Mitchell, assumed care for this patient upon walking in the room at 1355.    69 y/o female with hx of colon CA, HTN, HLD, and DM presents to ED with c/o L sided chest pain that radiates to her L posterior shoulder onset this morning when she woke up.   Pt reports that she thinks that she slept wrong last night on her Mediport in her L chest and that is what caused her pain.  She reports having Mediport place in 2020 in which she is getting it replaced over the next few weeks.  She reports Dr. Herron recently performed a stress test which was normal and that she has a f/u on 6/3.  She denies SOB, sweating, and nausea. .  The onset was "this morning".  The course/duration of symptoms is constant.  Location: Left chest. Radiating pain: left shoulder.  Posterior. The character of symptoms is "pain".  The degree at onset was moderate.  The degree at maximum was moderate.  The degree at present is moderate.  The exacerbating factor is none.  The relieving factor is none.  Risk factors consist of hypertension, diabetes mellitus and   HLD   Colon CA.  Prior episodes: none.  Therapy today None.  Associated symptoms: denies nausea.        Review of Systems   Constitutional symptoms:  No sweats,    Skin symptoms:  Negative except as " documented in HPI.   Eye symptoms:  Negative except as documented in HPI.   ENMT symptoms:  Negative except as documented in HPI.   Respiratory symptoms:  No shortness of breath,    Cardiovascular symptoms:  Chest pain, left chest.    Gastrointestinal symptoms:  No nausea,    Musculoskeletal symptoms:  L posterior shoulder.   Neurologic symptoms:  Negative except as documented in HPI.   Psychiatric symptoms:  Negative except as documented in HPI.             Additional review of systems information: All other systems reviewed and otherwise negative.      Health Status   Allergies: No known allergies.   Medications: Per nurse's notes.      Past Medical/ Family/ Social History   Medical history:    Active  Hypertension ([id number])  Resolved  Breast cancer ([id number]):  Resolved.  Cancer of colon ([id number]):  Resolved.,   HLD   DM .   Surgical history:    right knee scope in  at 61 Years.  right breast lumpectomy in  at 57 Years.   section () in  at 37 Years.   section () in  at 36 Years.  colon resection..   Family history:    Primary malignant neoplasm of breast  Mother  Sister  Primary malignant neoplasm of lung  Brother  Stroke.  Father  .   Social history: Alcohol use: Denies, Tobacco use: Denies, Drug use: Denies, Occupation: Retired, Family/social situation: .      Physical Examination               Vital Signs   Vital Signs   2021 11:23 CDT      Temperature Oral          36.9 DegC                             Temperature Oral (calculated)             98.42 DegF                             Peripheral Pulse Rate     69 bpm                             Respiratory Rate          20 br/min                             SpO2                      99 %                             Oxygen Therapy            Room air                             Systolic Blood Pressure   98 mmHg                             Diastolic Blood Pressure  49 mmHg   LOW  .   Measurements   5/11/2021 11:23 CDT      Weight Dosing             113.5 kg                             Weight Measured and Calculated in Lbs     250.22 lb  .   Basic Oxygen Information   5/11/2021 13:36 CDT      Oxygen Therapy            Room air    5/11/2021 11:23 CDT      SpO2                      99 %                             Oxygen Therapy            Room air  .   General:  Alert, no acute distress, well appearing, conversant   Neurological:  Alert and oriented to person, place, time, and situation, No focal neurological deficit observed, CN II-XII intact, normal sensory observed, normal motor observed, normal speech observed, normal coordination observed.    Skin:  Warm, dry, intact   Head:  Normocephalic, atraumatic.    Neck:  Supple, trachea midline   Eye:  Pupils are equal, round and reactive to light, extraocular movements are intact, normal conjunctiva.    Ears, nose, mouth and throat:  Oral mucosa moist.   Cardiovascular:  Regular rate and rhythm, No murmur, Normal peripheral perfusion, No edema   Respiratory:  Lungs are clear to auscultation, respirations are non-labored, breath sounds are equal, Symmetrical chest wall expansion.    Chest wall:  No tenderness, patient has a L chest wall Mediport.    Musculoskeletal:  Normal ROM, normal strength, no tenderness, no swelling, no deformity.    Gastrointestinal:  Soft, Nontender, Non distended.    Psychiatric:  Cooperative, appropriate mood & affect, normal judgment      Medical Decision Making   Documents reviewed:  Emergency department nurses' notes.   Orders  Launch Order Profile (Selected)   Inpatient Orders  Canceled  heparin flush: 500 units, IV Push, Now, *Est. first dose 08/27/19 14:59:00 CDT, *Est. stop date 08/27/19 14:59:00 CDT, Future Order  Completed  aspirin 325 mg oral tablet: 325 mg, form: Tab, Oral, Once, first dose 05/11/21 13:57:00 CDT, stop date 05/11/21 13:57:00 CDT, STAT, 4 chew tab = 5 grains  aspirin: 324 mg, 4 tab(s),  Tab-Chew, N/A, Once, Stop date 05/11/21 14:00:21 CDT, Physician Stop, 05/11/21 14:00:21 CDT  nitroglycerin 2% transdermal ointment: 1 in, form: Ointment, TOP, Once, first dose 05/11/21 13:57:00 CDT, stop date 05/11/21 13:57:00 CDT, STAT  nitroglycerin: 1 in, Ointment, N/A, Once, Stop date 05/11/21 13:52:21 CDT, Physician Stop, 05/11/21 13:52:21 CDT  nitroglycerin: 1 in, form: Ointment, TOP, AdHoc, first dose 05/11/21 13:56:00 CDT, stop date 05/11/21 13:56:00 CDT  Discontinued  Heparin Flush 100 U/mL - 5 mL: 500 units, form: Injection, IV Push, Once-chemo, first dose 06/11/19 15:20:00 CDT, stop date 08/16/19 14:00:00 CDT, Routine, Months 1, 4  .   Results review:  Lab results : Lab View   5/11/2021 11:56 CDT      Sodium Lvl                138 mmol/L                             Potassium Lvl             4.2 mmol/L                             Chloride                  107 mmol/L                             CO2                       24 mmol/L                             Calcium Lvl               9.4 mg/dL                             Glucose Lvl               134 mg/dL  HI                             BUN                       27.7 mg/dL  HI                             Creatinine                0.95 mg/dL                             eGFR-AA                   >60  NA                             eGFR-DIAMANTE                  >60 mL/min/1.73 m2  NA                             Bili Total                0.5 mg/dL                             Bili Direct               0.2 mg/dL                             Bili Indirect             0.30 mg/dL                             AST                       16 unit/L                             ALT                       14 unit/L                             Alk Phos                  102 unit/L                             Total Protein             7.3 gm/dL                             Albumin Lvl               3.8 gm/dL                             Globulin                  3.5 gm/dL                              A/G Ratio                 1.1 ratio                             Troponin-I                0.013 ng/mL                             PT                        23.7 second(s)  HI                             INR                       2.2  HI                             PTT                       42.4 second(s)  HI                             WBC                       5.7 x10(3)/mcL                             RBC                       3.81 x10(6)/mcL  LOW                             Hgb                       10.9 gm/dL  LOW                             Hct                       34.4 %  LOW                             Platelet                  288 x10(3)/mcL                             MCV                       90.3 fL                             MCH                       28.6 pg                             MCHC                      31.7 gm/dL  LOW                             RDW                       13.7 %                             MPV                       9.6 fL                             Abs Neut                  3.46 x10(3)/mcL                             Neutro Auto               60 %  NA                             Lymph Auto                26 %  NA                             Mono Auto                 11 %  NA                             Eos Auto                  2 %  NA                             Abs Eos                   0.1 x10(3)/mcL                             Basophil Auto             0 %  NA                             Abs Neutro                3.46 x10(3)/mcL                             Abs Lymph                 1.5 x10(3)/mcL                             Abs Mono                  0.6 x10(3)/mcL                             Abs Baso                  0.0 x10(3)/mcL    .   Radiology results:  Rad Results (ST)  < 12 hrs   Accession: [id number]  Order: XR Chest 2 Views  Report Dt/Tm: 05/11/2021 12:12  Report:   Clinical History  Chest Pain     Technique  2 views of the chest.      Comparison  No prior imaging available for comparison.     Findings  Lungs are clear with no visualized focal airspace opacity.  The trachea appears midline.  The cardiomediastinal silhouette is within normal limits. Left-sided  Mediport is noted.  There is no evidence of pneumothorax or pleural effusion.  Visualized abdomen, soft tissues, and osseous structures are  unremarkable.     Impression  No acute cardiopulmonary process.        .      Impression and Plan   Diagnosis   Chest pain (HKZ72-MM R07.9)   DM - Diabetes mellitus (VCO81-BX E11.9)   Hypertension (TDG84-EI I10)   Morbid obesity (YOP00-GM E66.01)   Vein compression (ATL44-SE I87.1)   Plan   Condition: Stable.    Disposition: Admit.    Counseled: Patient, Regarding diagnosis, Regarding diagnostic results, Regarding treatment plan, Patient indicated understanding of instructions.    Notes:   I, Chris Ca, acted solely as a scribe for and in the presence of Dr. Mitchell who performed the service..       Addendum      Teaching-Supervisory Addendum-Brief   Notes: I, Dr. Mitchell, personally performed the services described in this documentation as scribed in my presence and it is both accurate and complete..

## 2022-04-29 NOTE — DISCHARGE SUMMARY
DISCHARGE DATE:  03/23/2021    HOSPITAL COURSE:  Mrs. Mae was brought to the cardiac cath lab today for further evaluation and management of signs and symptoms suggestive of significant iliac vein compression syndrome.  As outlined in the operative report, the patient underwent uneventful inferior vena cava and selective bilateral lower extremity venography and ultrasonography, found to have significant bilateral iliac vein compression for which she underwent successful percutaneous transluminal venoplasty/stenting of the bilateral lower extremities.  The postoperative course is being monitored closely in preparation for discharge home later today.    DISCHARGE DIAGNOSES:    1. 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  2. Status post successful simultaneous percutaneous transluminal venoplasty/stenting of the bilateral common and external iliac veins.  3. Chronic peripheral edema.  4. Intermittent claudication symptomatology affecting the bilateral lower extremities.  5. Venous stasis symptomatology affecting bilateral lower extremities.  6. Morbid obesity.    RECOMMENDATIONS:    1. Mrs. Mae will be arranged for discharge home today to continue the same medications as on admission.  She will continue on:  a. Plavix daily.    b. I will be adding Xarelto 15 mg daily.   c. Other p.r.n. medications for postoperative discomfort.    2. I have asked her to call or return should she have any further problems or complaints.  I will be checking on her progress in my office in the very near future.  We will continue to monitor her     progress closely and titrate her medical therapies as tolerated.    3. Further recommendations forthcoming.      ______________________________  Cornelius Herron DO    CT/US  DD:  03/23/2021  Time:  03:57PM  DT:  03/23/2021  Time:  06:09PM  Job #:  [id number]

## 2022-04-29 NOTE — OP NOTE
Patient:   Osiris Mae            MRN: [id number]            FIN: [id number]               Age:   70 years     Sex:  Female     :  1950   Associated Diagnoses:   Edema leg; Morbid obesity; Venous intermittent claudication; Venous stasis syndrome; Restless legs syndrome (RLS); Vein compression   Author:   Cornelius Herron DO      Brief Operative Note   Operative Information   Date/ Time:  3/23/2021 15:04:00.     Preoperative Diagnosis: Edema leg (SFU89-ST R60.0), Restless legs syndrome (RLS) (ETL47-HT G25.81), Morbid obesity (OJJ46-YM E66.01), Venous intermittent claudication (NFP81-XU I87.8), Venous stasis syndrome (VAO14-RH I87.8).     Postoperative Diagnosis: Vein compression (GGC35-JI I87.1), Edema leg (ILI34-LX R60.0), Restless legs syndrome (RLS) (GXG89-HW G25.81), Venous intermittent claudication (FKE12-SI I87.8), Venous stasis syndrome (WSP40-PQ I87.8).     Procedures Performed: IVC and b/l LE edema w IVUS   PTV/Stents x 2 to the b/l CIV/EIV, via the b/l FV's.   .     Surgeon: Cornelius Herron DO     Esimated blood loss: No blood loss.     Description of Procedure/Findings/    Complications: See op report on chart.   .

## 2022-04-29 NOTE — PROGRESS NOTES
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   Author:   Mary Barakat MD      HEMATOLOGY/ONCOLOGY VISIT      Visit Information   Dx & staging   Initial Consultation:18  Referring Physician:Dr Caraballo  Other Physicians:  Code Status:Not addressed    Diagnosis/Problem list:   T2N1Mx Stage III colon cancer --Dx   DCIS (Dx ) s/p 5 years of Femara    Present Treatment:  Surveillance    Treatment history:    Right breast lumpectomy and axillary lymph node dissection (2007)  Femara x 5 years  Xeloda + Oxaloplatin X 4 CYCLES (12 WEEKS) (18-10/30/18)     Plan of care: Surveillance    Imaging reports:  CT A/P 18:liver is normal in size and contour, tiny focal hypodensity  right hepatic lobe, unchanged, measuring 3 mm. No suggestion of /recurrent disease. Stable NAVDEEP 4.5 mm nodule.  CTC/A/P 19: 4 mm pulmonary nodule in the NAVDEEP. liver is diffusely hypodense suggestive of hepatic steatosis. right hemicolectomy with intact anastomotic sutures. The remanent colon demonstrates scattered colonic diverticula without adjacent inflammatory changes. No evidence for metastatic disease.  CT C/A/P on 21:  1. No new suspicious findings chest, abdomen or pelvis. 2. Diverticulitis with mild sigmoid inflammation, correlate clinically for diverticulitis.    Clinical History: Ms. Mae is a 72 y/o female with a hsitory of DCIS that went for a regular followup with Dr Shane and routine blood counts showed anemia. She was referred to GI and first colonoscopy on 18 showed mass in her  proximal ascending colon on the opposite side of the lumen from the ileocecal valve. Pathology revealed adenocarcinoma, moderately differentiated.     On 2018, she underwent right  colon, hemicolectomy, including segment of terminal ileum. Pathology revealed invasive colonic adenocarcinoma, grade 2 measuring 3.1 cm in maximum dimension extending into but not beyond the muscularis papaya with surgical margins free of tumor. 2 of 17 pericolonic lymph nodes were positive for metastatic colonic adenocarcinoma. Dr. Jarrod Caraballo performed her surgery. CEA prior to surgery was 178. Preop CT abdomen and pelvis showed right lower quadrant mesenteric mass and no other signs of metastatic disease.     According to [facility] documentation from 3/15/2018, patient is up-to-date with her annual mammograms although we will have to request this documentation.          Chief Complaint   11/8/2021 10:31 CST      No Concerns today.      Interval History     Patient presents today in surveillance for colon cancer. She completed adjuvant XELOX on 10/30/2018. She says she had colonoscopy in 8/2021. She reports that she has diverticulitis.  Last visit, Levaquin 750 mg x 7 days. The prescription was too expensive so the order was changed to Cipro 500 mg q12h x 7days.  Will repeat Ct scans. She says she is doing well, and has no complaints today. Denies pain. She denies any fever, chills, sweats. No CP or SOB. No changes in BM. No visible blood in stools or urine.       Mammogram 6/7/2021 and was ready Bir[facility] cat 2: benign. She does mammogram [facility]  CEA normal           Review of Systems   All 12 points of the review of systems were obtained and pertinent as per above history      Health Status   Allergies:    Allergic Reactions (All)  No Known Allergies,    Allergies (1) Active Reaction  No Known Allergies None Documented     Current medications:  (Selected)   Outpatient Medications  Future  Heparin Flush 100 U/mL - 5 mL: 500 units, form: Injection, IV Push, Once-chemo, *Est. first dose 02/08/22 11:00:00 CST, *Est. stop date 02/08/22 11:00:00 CST, Routine, Months 4, Future Order  Heparin Flush 100 U/mL - 5 mL: 500  units, form: Injection, IV Push, Once-chemo, *Est. first dose 11/08/21 11:00:00 CST, *Est. stop date 11/08/21 11:00:00 CST, Routine, Months 1, Future Order  Prescriptions  Prescribed  Xarelto 15mg Tablet: 15 mg = 1 tab(s), Oral, Daily, # 30 tab(s), 5 Refill(s)  Documented Medications  Documented  Bystolic 5 mg oral tablet: 5 mg = 1 tab(s), Oral, Daily, 0 Refill(s)  Centrum Silver oral tablet: 1 tab(s), Oral, Daily, 0 Refill(s)  Iron 100 Plus oral tablet: 1 tab, Oral, Daily, 0 Refill(s)  Trulicity Pen: 1.5 mg, Subcutaneous, once a week, 0 Refill(s)  acetaminophen-hydrocodone 325 mg-10 mg oral tablet: 1 tab(s), Oral, TID  amLODIPine 10 mg oral tablet: 10 mg = 1 tab(s), Oral, Daily  ascorbic acid-carbonyl iron 250 mg-100 mg oral tablet: 1 tab(s), Oral, Daily  atorvastatin 80 mg oral tablet: 80 mg = 1 tab(s), Oral, Daily, # 30 tab(s), 0 Refill(s)  cilostazol: 1 tab, Oral, Daily, 0 Refill(s)  clopidogrel 75 mg oral tablet: 75 mg = 1 tab(s), Oral, Daily, 0 Refill(s)  diclofenac 1% topical gel: 2 gm =, TOP, QID  hydrochlorothiazide-olmesartan 12.5 mg-20 mg oral tablet: 1 tab(s), Oral, Daily  meloxicam 15 mg oral tablet: 15 mg = 1 tab(s), Oral, Daily  meloxicam 7.5 mg oral tablet: 7.5 mg = 1 tab(s), Oral, Daily  methocarbamol 500 mg oral tablet: 500 mg = 1 tab(s), Oral, BID  rosuvastatin 20 mg oral tablet: 20 mg = 1 tab(s), Oral, qPM,   Reviewed   Problem list:    All Problems  Review of care plan / SNOMED CT 7363877402 / Confirmed  DM - Diabetes mellitus / SNOMED CT 139384395 / Confirmed  Morbid obesity / SNOMED CT 144954608 / Probable  Hypertension / SNOMED CT [id number] / Confirmed  Resolved: Cancer of colon / SNOMED CT 2652251898  Resolved: Breast cancer / SNOMED CT 712936977,    Active Problems (4)  DM - Diabetes mellitus   Hypertension   Morbid obesity   Review of care plan         Histories   Past Medical History:    Active  Hypertension  ([id number])  Resolved  Breast cancer (239358278):  Resolved.  Cancer of colon (3104169075):  Resolved.   Family History:    Primary malignant neoplasm of breast  Mother  Sister  Primary malignant neoplasm of lung  Brother  Stroke.  Father     Procedure history:    right knee scope in  at 61 Years.  right breast lumpectomy in  at 57 Years.   section () in  at 37 Years.   section () in  at 36 Years.  colon resection.   Social History        Social & Psychosocial Habits    Alcohol  2016 Risk Assessment: Denies Alcohol Use    2018  Use: Current    Comment: on occassion - 2018 14:04 - Aileen Godinez LPN    2021  Use: Past    2021  Use: Current    Type: Wine    Frequency: 1-2 times per month    Has alcohol use interfered with work or home life? No    Has anyone been hurt or at risk by your drinking? No    Concerns about alcohol use in household: No    Substance Use  2016 Risk Assessment: Denies Substance Abuse    2021  Use: Never    Tobacco  2016 Risk Assessment: Denies Tobacco Use    2021  Use: Never (less than 100 in l    Patient Wants Consult For Cessation Counseling N/A    Abuse/Neglect  2021  SHX Any signs of abuse or neglect No  .        Physical Examination   Vital Signs   2021 10:22 CST      Temperature Oral          36.8 DegC                             Temperature Oral (calculated)             98.24 DegF                             Peripheral Pulse Rate     67 bpm                             SpO2                      99 %                             Oxygen Therapy            Room air                             Systolic Blood Pressure   124 mmHg                             Diastolic Blood Pressure  75 mmHg                             Blood Pressure Location   Left arm                             Manual Cuff BP            No     Measurements from flowsheet : Measurements    11/8/2021 10:22 CST      Weight Dosing             117.100 kg                             Weight Measured           117.1 kg                             Weight Measured and Calculated in Lbs     258.16 lb                             Height/Length Dosing      168.00 cm                             Height/Length Measured    168 cm                             BSA Measured              2.34 m2                             Body Mass Index Measured  41.49 kg/m2     General:  Alert and oriented, No acute distress.         Appearance: Morbidly obese.    Eye:  Pupils are equal, round and reactive to light, Extraocular movements are intact, Normal conjunctiva, Sclera.    HENT:  Normocephalic, Oral mucosa is moist, No pharyngeal erythema, No sinus tenderness.    Neck:  Supple, No jugular venous distention, No lymphadenopathy.    Respiratory:  Lungs are clear to auscultation, Respirations are non-labored.    Cardiovascular:  Normal rate, Regular rhythm, No murmur, No gallop, No edema.    Breast:  No mass, No tenderness, No discharge.    Gastrointestinal:  Soft, Non-tender, No organomegaly, obese., surgical scars..         Bowel sounds: Present.    Genitourinary:  No costovertebral angle tenderness, No inguinal tenderness.    Lymphatics:  No lymphadenopathy neck, axilla, groin.    Musculoskeletal:  Normal range of motion, Normal gait.    Integumentary:  Warm, Dry.    Neurologic:  Alert, Oriented, No focal deficits, Cranial Nerves II-XII are grossly intact.    Cognition and Speech:  Oriented, Speech clear and coherent, Functional cognition intact.    Psychiatric:  Cooperative, Appropriate mood & affect, Normal judgment, Non-suicidal.    ECOG Performance Scale: 1- Strenuous physical activity restricted; fully ambulatory and able to carry out light work.      Review / Management   Radiology Reports:  PET/CT 8/15/18: COMPARISON: Previous PET scan January 24, 2018. No recent comparative studies available.  FINDINGS: Bilateral lower  lung zones mostly about the paraspinal locations are remarkable for hypoventilatory changes and/or mild fibrosis. There is no suspicious hypermetabolic pulmonary nodule. Lungs are without acute consolidation and there is no fluid within the pleural or the pericardial spaces. Neck and chest lymph nodes are small in size without hypermetabolism. Thoracic aorta is without aneurysmal dilatation. Coronary arteries calcified plaques. Heart is of normal size.    Previous right hemicolectomy with enterocolonic anastomosis about the hepatic fracture without local hypermetabolic soft tissue proliferation. Additional suture lines involve the left aspect of the transverse colon close to the the midline which is surrounded by inflammatory groundglass complex mostly along the posterior aspect which covers an approximate area of 3.2 x 2.4 cm. This inflammatory complex is without appearance of a typical mass lesion though is hypermetabolic with SUV  of 5.43. Remaining colon is remarkable for noninflamed diverticulosis coli. There is no free fluid. There are no mesenteric or retroperitoneal periaortic hypermetabolic enlarged lymph nodes.    Physiologic fluorodeoxyglucose distribution is within the liver, pancreas and spleen without hypermetabolic focus. Gallbladder is nondistended without intraluminal calcified content. There is no hypermetabolism about the adrenals. Left kidney cortical nonmetabolic cyst. There is right kidney pelvis inferior aspect 1.6 cm nonoccluding calculus. There is no hypermetabolism about the uterus. There is no skeletal hypermetabolism concerning for metastasis.  IMPRESSION:  1.  No thoracic primary or secondary neoplasm evidence.  2.  Right hemicolectomy with unremarkable hepatic flexure level enterocolonic anastomosis without hypermetabolic soft tissue proliferation.   3.  Transverse colon secondary level suture lines to the left of midline are surrounded by groundglass inflammatory complex with  hypermetabolism. This does not have the typical appearance of a metastatic nodule or mass lesion. Attention to close follow-up exam with CT within 3 months.  4. No metastatic lymphadenopathy or solid organs involvement.  Signature Line Electronically Signed By: Jose Aly MD Date/Time Signed: 08/15/2018 13:52      Pathology Reports:  6/20/2018, right colon hemicolectomy, including segment of terminal ileum:  Invasive colonic adenocarcinoma, grade 2.  Tumor measures 3.17 m in maximum dimension.  Tumor focally extends into, but not beyond the muscularis propria.  Surgical margins free of tumor.  Appendix free of tumor and transmural cute inflammation.  Diverticulosis.  2 of 17 pericolonic lymph nodes positive for metastatic colonic adenocarcinoma.    12/7/2007, Right breast, lumpectomy:  1. Ductal carcinoma in situ, high nuclear grade, with comedo necrosis and foci of microinvasion.  2. Tumor extends into lobules.  3. Surgical margins of excision free of tumor.  4. Non-neoplastic breast parenchyma demonstrates fibrocystic changes without atypia.      Results review:  Lab results   11/8/2021 10:20 CST      WBC                       6.4 x10(3)/mcL                             RBC                       3.75 x10(6)/mcL  LOW                             Hgb                       10.3 gm/dL  LOW                             Hct                       33.4 %  LOW                             Platelet                  283 x10(3)/mcL                             MCV                       89.1 fL                             MCH                       27.5 pg                             MCHC                      30.8 gm/dL  LOW                             RDW                       14.5 %                             MPV                       9.3 fL  LOW                             Abs Neut                  3.87 x10(3)/mcL                             NEUT%                     60.4 %  NA                             NEUT#                      3.9 x10(3)/mcL                             LYMPH%                    28.0 %  NA                             LYMPH#                    1.8 x10(3)/mcL                             MONO%                     7.8 %  NA                             MONO#                     0.5 x10(3)/mcL                             EOS%                      3.0 %  NA                             EOS#                      0.2 x10(3)/mcL                             BASO%                     0.5 %  NA                             BASO#                     0.0 x10(3)/mcL  , Last 10 Days Lab Results : [facility]   11/8/2021 10:20 CST      Hgb                       10.3 gm/dL  LOW    8/3/2021 15:11 CDT       Hgb                       9.9 gm/dL  LOW    6/17/2021 12:54 CDT      Hgb                       9.6 gm/dL  LOW    5/11/2021 11:56 CDT      Hgb                       10.9 gm/dL  LOW    3/22/2021 11:18 CDT      Hgb                       11.3 gm/dL  LOW    12/15/2020 11:23 CST     Hgb                       11.1 gm/dL  LOW    6/22/2020 15:41 CDT      Hgb                       11.5 gm/dL  LOW    3/18/2020 11:18 CDT      Hgb                       11.0 gm/dL  LOW    12/2/2019 13:41 CST      Hgb                       10.8 gm/dL  LOW  .       Impression and Plan   Diagnosis     Cancer of colon (ZCL87-GP C18.9).     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       IMPRESSION:    1) Stage III colon cancer status post 3 months of adjuvant chemotherapy with XELOX.-- Dx 6/20/2018   Colonoscopy in 7/2019-- s/p polypectomy x 2. Next due in 7/2021. 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  CEA normal.  Patient is due for scans soon.  CT  C/A/P with contrast   RTC in 4 weeks for results of scans      The patient was given ample opportunity to ask questions and they were all answered to satisfaction; patient demonstrated understanding of what we discussed and is agreeable to the plan.      MARY AGUILA MD           Professional Services   I, Lori Barr LPN, acted solely as a scribe in the presence of Dr. Mary Aguila, who performed these services.

## 2022-04-29 NOTE — DISCHARGE SUMMARY
DISCHARGE DATE:  05/12/2021    HOSPITAL COURSE:  Osiris was admitted for evaluation and management of sinus symptoms suggestive of angina pectoris.  She presented with left-sided chest discomfort and admitted to rule out any signs of acute coronary syndrome.  She underwent serial cardiac enzymes which were found to be essentially unremarkable.       She had an uneventful night, and today she is feeling back to normal.  The left-sided chest discomfort still persists but is mild, and she feels it is related to her Mediport which had been placed in the past for medication management during her bout with colon cancer.  She otherwise denies any problems or complaints and anticipating discharge home today.     Discharge laboratory data is stable.  The cardiac enzymes have been essentially within normal limits.       Telemetry continues to show stable sinus rhythm with occasional PVC.    DISCHARGE DIAGNOSES:    1. Atypical chest discomfort.  May be musculoskeletal in etiology.  2. Morbid obesity.  3. History of bilateral iliac vein compression for which she has undergone bilateral iliac vein percutaneous transluminal venoplasty.  4. Hypertension.   5. Dyslipidemia.    RECOMMENDATIONS:  Osiris will be arranged for discharge home to continue on the same medications as on admission.  I have asked her to call or return should she have any further problems or complaints.  I will be checking on her progress in my office in the very near future.  Will continue to monitor her progress closely and titrate medical therapies as tolerated.  She will continue with over-the-counter analgesic therapy for the atypical and possible musculoskeletal discomfort in the left chest and left shoulder area.         Further recommendations forthcoming.        ______________________________  Cornelius Herron DO    CT/UM  DD:  05/12/2021  Time:  08:31PM  DT:  05/12/2021  Time:  08:46PM  Job #:  [id number]

## 2022-06-13 DIAGNOSIS — C50.911 DUCTAL CARCINOMA IN SITU (DCIS) OF RIGHT BREAST WITH MICROINVASIVE COMPONENT: ICD-10-CM

## 2022-06-13 DIAGNOSIS — C18.9 MALIGNANT NEOPLASM OF COLON, UNSPECIFIED PART OF COLON: Primary | ICD-10-CM

## 2022-07-05 ENCOUNTER — LAB VISIT (OUTPATIENT)
Dept: LAB | Facility: HOSPITAL | Age: 72
End: 2022-07-05
Attending: INTERNAL MEDICINE
Payer: MEDICARE

## 2022-07-05 DIAGNOSIS — C50.911 DUCTAL CARCINOMA IN SITU (DCIS) OF RIGHT BREAST WITH MICROINVASIVE COMPONENT: ICD-10-CM

## 2022-07-05 DIAGNOSIS — C18.9 MALIGNANT NEOPLASM OF COLON, UNSPECIFIED PART OF COLON: ICD-10-CM

## 2022-07-05 LAB
ALBUMIN SERPL-MCNC: 3.6 GM/DL (ref 3.4–4.8)
ALBUMIN/GLOB SERPL: 1.1 RATIO (ref 1.1–2)
ALP SERPL-CCNC: 95 UNIT/L (ref 40–150)
ALT SERPL-CCNC: 16 UNIT/L (ref 0–55)
AST SERPL-CCNC: 17 UNIT/L (ref 5–34)
BASOPHILS # BLD AUTO: 0.02 X10(3)/MCL (ref 0–0.2)
BASOPHILS NFR BLD AUTO: 0.4 %
BILIRUBIN DIRECT+TOT PNL SERPL-MCNC: 0.3 MG/DL
BUN SERPL-MCNC: 29.5 MG/DL (ref 9.8–20.1)
CALCIUM SERPL-MCNC: 9.5 MG/DL (ref 8.4–10.2)
CEA SERPL-MCNC: <1.73 NG/ML (ref 0–3)
CHLORIDE SERPL-SCNC: 109 MMOL/L (ref 98–107)
CO2 SERPL-SCNC: 24 MMOL/L (ref 23–31)
CREAT SERPL-MCNC: 1.34 MG/DL (ref 0.55–1.02)
EOSINOPHIL # BLD AUTO: 0.13 X10(3)/MCL (ref 0–0.9)
EOSINOPHIL NFR BLD AUTO: 2.8 %
ERYTHROCYTE [DISTWIDTH] IN BLOOD BY AUTOMATED COUNT: 13.2 % (ref 11.5–17)
GLOBULIN SER-MCNC: 3.3 GM/DL (ref 2.4–3.5)
GLUCOSE SERPL-MCNC: 213 MG/DL (ref 82–115)
HCT VFR BLD AUTO: 35 % (ref 37–47)
HGB BLD-MCNC: 10.8 GM/DL (ref 12–16)
IMM GRANULOCYTES # BLD AUTO: 0.02 X10(3)/MCL (ref 0–0.04)
IMM GRANULOCYTES NFR BLD AUTO: 0.4 %
LYMPHOCYTES # BLD AUTO: 1.28 X10(3)/MCL (ref 0.6–4.6)
LYMPHOCYTES NFR BLD AUTO: 27.4 %
MCH RBC QN AUTO: 28.3 PG (ref 27–31)
MCHC RBC AUTO-ENTMCNC: 30.9 MG/DL (ref 33–36)
MCV RBC AUTO: 91.9 FL (ref 80–94)
MONOCYTES # BLD AUTO: 0.4 X10(3)/MCL (ref 0.1–1.3)
MONOCYTES NFR BLD AUTO: 8.5 %
NEUTROPHILS # BLD AUTO: 2.8 X10(3)/MCL (ref 2.1–9.2)
NEUTROPHILS NFR BLD AUTO: 60.5 %
PLATELET # BLD AUTO: 240 X10(3)/MCL (ref 130–400)
PMV BLD AUTO: 8.8 FL (ref 7.4–10.4)
POTASSIUM SERPL-SCNC: 4.4 MMOL/L (ref 3.5–5.1)
PROT SERPL-MCNC: 6.9 GM/DL (ref 5.8–7.6)
RBC # BLD AUTO: 3.81 X10(6)/MCL (ref 4.2–5.4)
SODIUM SERPL-SCNC: 142 MMOL/L (ref 136–145)
WBC # SPEC AUTO: 4.7 X10(3)/MCL (ref 4.5–11.5)

## 2022-07-05 PROCEDURE — 80053 COMPREHEN METABOLIC PANEL: CPT

## 2022-07-05 PROCEDURE — 85025 COMPLETE CBC W/AUTO DIFF WBC: CPT

## 2022-07-05 PROCEDURE — 36415 COLL VENOUS BLD VENIPUNCTURE: CPT

## 2022-07-05 PROCEDURE — 82378 CARCINOEMBRYONIC ANTIGEN: CPT

## 2022-07-13 ENCOUNTER — HOSPITAL ENCOUNTER (OUTPATIENT)
Dept: RADIOLOGY | Facility: HOSPITAL | Age: 72
Discharge: HOME | End: 2022-07-13
Attending: NURSE PRACTITIONER
Payer: MEDICARE

## 2022-07-13 DIAGNOSIS — C18.9 COLON CANCER: ICD-10-CM

## 2022-07-13 PROCEDURE — 71260 CT THORAX DX C+: CPT | Mod: TC

## 2022-07-13 PROCEDURE — 25500020 PHARM REV CODE 255: Performed by: NURSE PRACTITIONER

## 2022-07-13 PROCEDURE — 74177 CT ABD & PELVIS W/CONTRAST: CPT | Mod: TC

## 2022-07-13 RX ADMIN — IOPAMIDOL 100 ML: 755 INJECTION, SOLUTION INTRAVENOUS at 01:07

## 2022-08-02 ENCOUNTER — OFFICE VISIT (OUTPATIENT)
Dept: HEMATOLOGY/ONCOLOGY | Facility: CLINIC | Age: 72
End: 2022-08-02
Payer: MEDICARE

## 2022-08-02 VITALS
HEART RATE: 53 BPM | OXYGEN SATURATION: 98 % | TEMPERATURE: 98 F | SYSTOLIC BLOOD PRESSURE: 145 MMHG | BODY MASS INDEX: 39.53 KG/M2 | DIASTOLIC BLOOD PRESSURE: 79 MMHG | HEIGHT: 66 IN | WEIGHT: 246 LBS

## 2022-08-02 DIAGNOSIS — R53.83 FATIGUE, UNSPECIFIED TYPE: ICD-10-CM

## 2022-08-02 DIAGNOSIS — C18.9 MALIGNANT NEOPLASM OF COLON, UNSPECIFIED PART OF COLON: Primary | ICD-10-CM

## 2022-08-02 DIAGNOSIS — D64.9 ANEMIA, UNSPECIFIED TYPE: ICD-10-CM

## 2022-08-02 DIAGNOSIS — C50.911 DUCTAL CARCINOMA IN SITU (DCIS) OF RIGHT BREAST WITH MICROINVASIVE COMPONENT: ICD-10-CM

## 2022-08-02 LAB
ALBUMIN SERPL-MCNC: 3.8 GM/DL (ref 3.4–4.8)
ALBUMIN/GLOB SERPL: 1 RATIO (ref 1.1–2)
ALP SERPL-CCNC: 102 UNIT/L (ref 40–150)
ALT SERPL-CCNC: 19 UNIT/L (ref 0–55)
AST SERPL-CCNC: 15 UNIT/L (ref 5–34)
B2 MICROGLOB SERPL-MCNC: 2.4 MG/L
BASOPHILS # BLD AUTO: 0.03 X10(3)/MCL (ref 0–0.2)
BASOPHILS NFR BLD AUTO: 0.4 %
BILIRUBIN DIRECT+TOT PNL SERPL-MCNC: 0.6 MG/DL
BUN SERPL-MCNC: 31.3 MG/DL (ref 9.8–20.1)
CALCIUM SERPL-MCNC: 9.5 MG/DL (ref 8.4–10.2)
CHLORIDE SERPL-SCNC: 106 MMOL/L (ref 98–107)
CO2 SERPL-SCNC: 27 MMOL/L (ref 23–31)
CREAT SERPL-MCNC: 1.11 MG/DL (ref 0.55–1.02)
EOSINOPHIL # BLD AUTO: 0.12 X10(3)/MCL (ref 0–0.9)
EOSINOPHIL NFR BLD AUTO: 1.4 %
ERYTHROCYTE [DISTWIDTH] IN BLOOD BY AUTOMATED COUNT: 13.1 % (ref 11.5–17)
FERRITIN SERPL-MCNC: 63.97 NG/ML (ref 4.63–204)
FOLATE SERPL-MCNC: 38.5 NG/ML (ref 7–31.4)
GLOBULIN SER-MCNC: 3.7 GM/DL (ref 2.4–3.5)
GLUCOSE SERPL-MCNC: 153 MG/DL (ref 82–115)
HCT VFR BLD AUTO: 36.3 % (ref 37–47)
HGB BLD-MCNC: 11.6 GM/DL (ref 12–16)
IGA SERPL-MCNC: 465 MG/DL (ref 69–517)
IGG SERPL-MCNC: 1354 MG/DL (ref 522–1631)
IGM SERPL-MCNC: 88 MG/DL (ref 33–293)
IMM GRANULOCYTES # BLD AUTO: 0.04 X10(3)/MCL (ref 0–0.04)
IMM GRANULOCYTES NFR BLD AUTO: 0.5 %
IRON SATN MFR SERPL: 26 % (ref 20–50)
IRON SERPL-MCNC: 88 UG/DL (ref 50–170)
LYMPHOCYTES # BLD AUTO: 2.21 X10(3)/MCL (ref 0.6–4.6)
LYMPHOCYTES NFR BLD AUTO: 26.4 %
MCH RBC QN AUTO: 28.6 PG (ref 27–31)
MCHC RBC AUTO-ENTMCNC: 32 MG/DL (ref 33–36)
MCV RBC AUTO: 89.4 FL (ref 80–94)
MONOCYTES # BLD AUTO: 0.76 X10(3)/MCL (ref 0.1–1.3)
MONOCYTES NFR BLD AUTO: 9.1 %
NEUTROPHILS # BLD AUTO: 5.2 X10(3)/MCL (ref 2.1–9.2)
NEUTROPHILS NFR BLD AUTO: 62.2 %
PLATELET # BLD AUTO: 311 X10(3)/MCL (ref 130–400)
PMV BLD AUTO: 9 FL (ref 7.4–10.4)
POTASSIUM SERPL-SCNC: 5 MMOL/L (ref 3.5–5.1)
PROT SERPL-MCNC: 7.5 GM/DL (ref 5.8–7.6)
RBC # BLD AUTO: 4.06 X10(6)/MCL (ref 4.2–5.4)
SODIUM SERPL-SCNC: 139 MMOL/L (ref 136–145)
TIBC SERPL-MCNC: 257 UG/DL (ref 70–310)
TIBC SERPL-MCNC: 345 UG/DL (ref 250–450)
VIT B12 SERPL-MCNC: 1113 PG/ML (ref 213–816)
WBC # SPEC AUTO: 8.4 X10(3)/MCL (ref 4.5–11.5)

## 2022-08-02 PROCEDURE — 85025 COMPLETE CBC W/AUTO DIFF WBC: CPT | Performed by: INTERNAL MEDICINE

## 2022-08-02 PROCEDURE — 83540 ASSAY OF IRON: CPT | Performed by: INTERNAL MEDICINE

## 2022-08-02 PROCEDURE — 82607 VITAMIN B-12: CPT | Performed by: INTERNAL MEDICINE

## 2022-08-02 PROCEDURE — 99214 OFFICE O/P EST MOD 30 MIN: CPT | Mod: S$PBB,,, | Performed by: INTERNAL MEDICINE

## 2022-08-02 PROCEDURE — 99999 PR PBB SHADOW E&M-EST. PATIENT-LVL IV: CPT | Mod: PBBFAC,,, | Performed by: INTERNAL MEDICINE

## 2022-08-02 PROCEDURE — 36415 COLL VENOUS BLD VENIPUNCTURE: CPT | Performed by: INTERNAL MEDICINE

## 2022-08-02 PROCEDURE — 80053 COMPREHEN METABOLIC PANEL: CPT | Performed by: INTERNAL MEDICINE

## 2022-08-02 PROCEDURE — 84165 PROTEIN E-PHORESIS SERUM: CPT | Performed by: INTERNAL MEDICINE

## 2022-08-02 PROCEDURE — 99999 PR PBB SHADOW E&M-EST. PATIENT-LVL IV: ICD-10-PCS | Mod: PBBFAC,,, | Performed by: INTERNAL MEDICINE

## 2022-08-02 PROCEDURE — 82728 ASSAY OF FERRITIN: CPT | Performed by: INTERNAL MEDICINE

## 2022-08-02 PROCEDURE — 82746 ASSAY OF FOLIC ACID SERUM: CPT | Performed by: INTERNAL MEDICINE

## 2022-08-02 PROCEDURE — 99214 PR OFFICE/OUTPT VISIT, EST, LEVL IV, 30-39 MIN: ICD-10-PCS | Mod: S$PBB,,, | Performed by: INTERNAL MEDICINE

## 2022-08-02 PROCEDURE — 82784 ASSAY IGA/IGD/IGG/IGM EACH: CPT | Performed by: INTERNAL MEDICINE

## 2022-08-02 PROCEDURE — 99214 OFFICE O/P EST MOD 30 MIN: CPT | Mod: PBBFAC | Performed by: INTERNAL MEDICINE

## 2022-08-02 PROCEDURE — 86146 BETA-2 GLYCOPROTEIN ANTIBODY: CPT | Performed by: INTERNAL MEDICINE

## 2022-08-02 RX ORDER — IRON,CARBONYL/ASCORBIC ACID 100-250 MG
1 TABLET ORAL DAILY
COMMUNITY
Start: 2022-07-07

## 2022-08-02 RX ORDER — MELOXICAM 7.5 MG/1
7.5 TABLET ORAL DAILY
COMMUNITY
Start: 2022-07-28

## 2022-08-02 RX ORDER — CILOSTAZOL 100 MG/1
100 TABLET ORAL DAILY
COMMUNITY
Start: 2022-07-23

## 2022-08-02 RX ORDER — DICLOFENAC SODIUM 10 MG/G
GEL TOPICAL
COMMUNITY
Start: 2022-04-29 | End: 2023-10-24

## 2022-08-02 RX ORDER — CLOPIDOGREL BISULFATE 75 MG/1
75 TABLET ORAL DAILY
COMMUNITY
Start: 2022-05-09

## 2022-08-02 RX ORDER — HYDROCODONE BITARTRATE AND ACETAMINOPHEN 10; 325 MG/1; MG/1
1 TABLET ORAL 3 TIMES DAILY
COMMUNITY
Start: 2022-07-22

## 2022-08-02 RX ORDER — NEBIVOLOL 10 MG/1
10 TABLET ORAL DAILY
COMMUNITY
Start: 2022-07-28

## 2022-08-02 RX ORDER — OLMESARTAN MEDOXOMIL AND HYDROCHLOROTHIAZIDE 20/12.5 20; 12.5 MG/1; MG/1
1 TABLET ORAL DAILY
COMMUNITY
Start: 2022-05-24 | End: 2023-10-24

## 2022-08-02 NOTE — PROGRESS NOTES
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 No recent comparative studies available.  FINDINGS: Bilateral lower lung zones mostly about the paraspinal locations are remarkable for hypoventilatory changes and/or mild fibrosis. There is no suspicious hypermetabolic pulmonary nodule. Lungs are without acute consolidation and there is no fluid within the pleural or the pericardial spaces. Neck and chest lymph nodes are small in size without hypermetabolism. Thoracic aorta is without aneurysmal dilatation. Coronary arteries calcified plaques. Heart is of normal size.    Previous right hemicolectomy with enterocolonic anastomosis about the hepatic fracture without local hypermetabolic soft tissue proliferation. Additional suture lines involve the left aspect of the transverse colon close to the the midline which is surrounded by inflammatory groundglass complex mostly along the posterior aspect which covers an approximate area of 3.2 x 2.4 cm. This inflammatory complex is without appearance of a typical mass lesion though is hypermetabolic with SUV  of 5.43. Remaining colon is remarkable for noninflamed diverticulosis coli. There is no free fluid. There are no mesenteric or retroperitoneal periaortic hypermetabolic enlarged lymph nodes.    Physiologic fluorodeoxyglucose distribution is within the liver, pancreas and spleen without hypermetabolic focus. Gallbladder is  nondistended without intraluminal calcified content. There is no hypermetabolism about the adrenals. Left kidney cortical nonmetabolic cyst. There is right kidney pelvis inferior aspect 1.6 cm nonoccluding calculus. There is no hypermetabolism about the uterus. There is no skeletal hypermetabolism concerning for metastasis.  IMPRESSION:  1.  No thoracic primary or secondary neoplasm evidence.  2.  Right hemicolectomy with unremarkable hepatic flexure level enterocolonic anastomosis without hypermetabolic soft tissue proliferation.   3.  Transverse colon secondary level suture lines to the left of midline are surrounded by groundglass inflammatory complex with hypermetabolism. This does not have the typical appearance of a metastatic nodule or mass lesion. Attention to close follow-up exam with CT within 3 months.  4. No metastatic lymphadenopathy or solid organs involvement.  Signature Line Electronically Signed By: Jose Aly MD Date/Time Signed: 08/15/2018 13:52    CT A/P 11/20/18:liver is normal in size and contour, tiny focal hypodensity  right hepatic lobe, unchanged, measuring 3 mm. No suggestion of /recurrent disease. Stable NAVDEEP 4.5 mm nodule.  CTC/A/P 12/2/19: 4 mm pulmonary nodule in the NAVDEEP. liver is diffusely hypodense suggestive of hepatic steatosis. right hemicolectomy with intact anastomotic sutures. The remanent colon demonstrates scattered colonic diverticula without adjacent inflammatory changes. No evidence for metastatic disease.  CT C/A/P on 6/14/21:  1. No new suspicious findings chest, abdomen or pelvis. 2. Diverticulitis with mild sigmoid inflammation, correlate clinically for diverticulitis.  CT Thorax W Contrast, CT Abdomen and Pelvis W Contrast 12/7/2021: Stable solid 4 mm left upper lobe nodule 2 millimetric left hepatic hypodensities remain stable. No suspicious liver lesion identified. No biliary dilatation.  IMPRESSION: No new or worsening malignant findings identified  since 6/14/2021.  Mammogram 6/7/2021:  was ready Birads cat 2: benign. She does mammogram [facility]  CT chest, abdomen and pelvis 7/13/22: No evidence of residual or recurrent disease seen, Chronic scarring in the lingula and right lower lobe, Stable   appearing left renal cyst and area of cortical calcification right kidney, Hepatic steatosis     Pathology:  6/20/2018, right colon hemicolectomy, including segment of terminal ileum:  Invasive colonic adenocarcinoma, grade 2.  Tumor measures 3.17 m in maximum dimension.  Tumor focally extends into, but not beyond the muscularis propria.  Surgical margins free of tumor.  Appendix free of tumor and transmural cute inflammation.  Diverticulosis.  2 of 17 pericolonic lymph nodes positive for metastatic colonic adenocarcinoma.    12/7/2007, Right breast, lumpectomy:  1. Ductal carcinoma in situ, high nuclear grade, with comedo necrosis and foci of microinvasion.  2. Tumor extends into lobules.  3. Surgical margins of excision free of tumor.  4. Non-neoplastic breast parenchyma demonstrates fibrocystic changes without atypia.    CLINICAL HISTORY:       Patient: Ms. Mae is a 71 y/o female with a hsitory of DCIS that went for a regular followup with Dr Shane and routine blood counts showed anemia. She was referred to GI and first colonoscopy on 5/29/18 showed mass in her  proximal ascending colon on the opposite side of the lumen from the ileocecal valve. Pathology revealed adenocarcinoma, moderately differentiated.     On 6/20/2018, she underwent right colon, hemicolectomy, including segment of terminal ileum. Pathology revealed invasive colonic adenocarcinoma, grade 2 measuring 3.1 cm in maximum dimension extending into but not beyond the muscularis papaya with surgical margins free of tumor. 2 of 17 pericolonic lymph nodes were positive for metastatic colonic adenocarcinoma. Dr. Jarrod Caraballo performed her surgery. CEA prior to surgery was 178. Preop CT abdomen and  "pelvis showed right lower quadrant mesenteric mass and no other signs of metastatic disease.     According to [facility] documentation from 3/15/2018, patient is up-to-date with her annual mammograms although we will have to request this documentation.     Chief Complaint: No Concerns today      Interval History:  ** She completed adjuvant XELOX on 10/30/2018. ** Last colonoscopy in 9/28/2021 by Dr Garcia- diverticulitis.     Patient presents to clinic today for follow up in surveillance for colon cancer and to discuss CT scan results. She reports needing right knee replacement, but awaiting approval by insurance. Otherwise,  she is doing well. Denies fever, chills, sweats and bleeding in urine or stools. No changes in bowel pattern. Labs reviewed.    ROS:  All 14 points ROS taken and positive as per Interval History, all other negative.    Histories:  PMH/PSH/FH/SOCIAL/ALLERGIES AND MEDS REVIEWED AND UPDATED AS APPROPRIATE       Vitals:    08/02/22 1115   BP: (!) 145/79   BP Location: Right arm   Patient Position: Sitting   Pulse: (!) 53   Temp: 98 °F (36.7 °C)   SpO2: 98%   Weight: 111.6 kg (246 lb)   Height: 5' 6" (1.676 m)      Physical Exam  Vitals and nursing note reviewed.   Constitutional:       General: She is not in acute distress.     Appearance: Normal appearance. She is well-developed.   HENT:      Head: Normocephalic and atraumatic.      Mouth/Throat:      Mouth: Mucous membranes are moist.   Eyes:      General: No scleral icterus.     Extraocular Movements: Extraocular movements intact.      Conjunctiva/sclera: Conjunctivae normal.      Pupils: Pupils are equal, round, and reactive to light.   Neck:      Vascular: No JVD.   Cardiovascular:      Rate and Rhythm: Normal rate and regular rhythm.      Heart sounds: No murmur heard.  Pulmonary:      Effort: Pulmonary effort is normal.      Breath sounds: Normal breath sounds. No wheezing or rhonchi.   Chest:      Chest wall: No deformity or tenderness. "   Breasts:      Right: No axillary adenopathy or supraclavicular adenopathy.      Left: No axillary adenopathy or supraclavicular adenopathy.       Abdominal:      General: Bowel sounds are normal. There is no distension.      Palpations: Abdomen is soft. There is no mass.      Tenderness: There is no abdominal tenderness.   Musculoskeletal:         General: No swelling or deformity.      Cervical back: Neck supple.   Lymphadenopathy:      Cervical: No cervical adenopathy.      Upper Body:      Right upper body: No supraclavicular or axillary adenopathy.      Left upper body: No supraclavicular or axillary adenopathy.      Lower Body: No right inguinal adenopathy. No left inguinal adenopathy.   Skin:     General: Skin is warm.      Coloration: Skin is not jaundiced.      Findings: No lesion or rash.      Nails: There is no clubbing.   Neurological:      General: No focal deficit present.      Mental Status: She is alert and oriented to person, place, and time.      Sensory: Sensation is intact.      Motor: Motor function is intact.      Gait: Gait is intact.   Psychiatric:         Attention and Perception: Attention normal.         Mood and Affect: Mood and affect normal.         Speech: Speech normal.         Behavior: Behavior is cooperative.         Thought Content: Thought content normal.         Cognition and Memory: Cognition normal.         Judgment: Judgment normal.       ECOG SCORE    1 - Restricted in strenuous activity-ambulatory and able to carry out work of a light nature         Laboratory:  CBC with Differential:  Lab Results   Component Value Date    WBC 8.4 08/02/2022    RBC 4.06 (L) 08/02/2022    HGB 11.6 (L) 08/02/2022    HCT 36.3 (L) 08/02/2022    MCV 89.4 08/02/2022    MCH 28.6 08/02/2022    MCHC 32.0 (L) 08/02/2022    RDW 13.1 08/02/2022     08/02/2022    MPV 9.0 08/02/2022        CMP:  Sodium Level   Date Value Ref Range Status   08/02/2022 139 136 - 145 mmol/L Final     Potassium Level    Date Value Ref Range Status   08/02/2022 5.0 3.5 - 5.1 mmol/L Final     Carbon Dioxide   Date Value Ref Range Status   08/02/2022 27 23 - 31 mmol/L Final     Blood Urea Nitrogen   Date Value Ref Range Status   08/02/2022 31.3 (H) 9.8 - 20.1 mg/dL Final     Creatinine   Date Value Ref Range Status   08/02/2022 1.11 (H) 0.55 - 1.02 mg/dL Final     Calcium Level Total   Date Value Ref Range Status   08/02/2022 9.5 8.4 - 10.2 mg/dL Final     Albumin Level   Date Value Ref Range Status   08/02/2022 3.8 3.4 - 4.8 gm/dL Final     Bilirubin Total   Date Value Ref Range Status   08/02/2022 0.6 <=1.5 mg/dL Final     Alkaline Phosphatase   Date Value Ref Range Status   08/02/2022 102 40 - 150 unit/L Final     Aspartate Aminotransferase   Date Value Ref Range Status   08/02/2022 15 5 - 34 unit/L Final     Alanine Aminotransferase   Date Value Ref Range Status   08/02/2022 19 0 - 55 unit/L Final     Estimated GFR-Non    Date Value Ref Range Status   11/08/2021 52 mL/min/1.73 m2 Final             Assessment:       1. Malignant neoplasm of colon, unspecified part of colon    2. 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 Anemia, unspecified type    4. Fatigue, unspecified type      1) Stage III colon cancer status post 3 months of adjuvant chemotherapy with XELOX.-- Dx 6/20/2018   Colonoscopy in 7/2019-- s/p polypectomy x 2. Repeated 8/2021- diverticulitis.  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  CEA normal.   Will continue surveillance.   Will order anemia and MM work up, no urine  RTC in 2 weeks to discuss labs results  Continue oral Iron supplement  Will order Mediport flushes every 2-3  months    Encouraged to call for any questions or problems  The patient was given ample opportunity to ask questions and they were all answered to satisfaction; patient demonstrated understanding of what we discussed and is agreeable to the plan.        CORAL AGUILA MD      Professional Services   I, Amaya Armas LPN, acted solely as a scribe for and in the presence of Dr. Coral Aguila, who performed these services.

## 2022-08-03 LAB
ALBUMIN % SPEP (OHS): 42.35 (ref 48.1–59.5)
ALBUMIN SERPL-MCNC: 3.8 GM/DL (ref 3.4–4.8)
ALBUMIN/GLOB SERPL: 1 RATIO (ref 1.1–2)
ALPHA 1 GLOB (OHS): 0.28 GM/DL (ref 0–0.4)
ALPHA 1 GLOB% (OHS): 3.71 (ref 2.3–4.9)
ALPHA 2 GLOB % (OHS): 11.87 (ref 6.9–13)
ALPHA 2 GLOB (OHS): 0.89 GM/DL (ref 0.4–1)
BETA GLOB (OHS): 1.58 GM/DL (ref 0.7–1.3)
BETA GLOB% (OHS): 21.07 (ref 13.8–19.7)
GAMMA GLOBULIN % (OHS): 20.99 (ref 10.1–21.9)
GAMMA GLOBULIN (OHS): 1.57 GM/DL (ref 0.4–1.8)
GLOBULIN SER-MCNC: 3.7 GM/DL (ref 2.4–3.5)
KAPPA LC FREE SER-MCNC: 3.02 MG/DL (ref 0.33–1.94)
KAPPA LC FREE/LAMBDA FREE SER: 0.9 {RATIO} (ref 0.26–1.65)
LAMBDA LC FREE SERPL-MCNC: 3.34 MG/DL (ref 0.57–2.63)
M SPIKE % (OHS): ABNORMAL
M SPIKE (OHS): ABNORMAL
PATH REV: NORMAL
PROT SERPL-MCNC: 7.5 GM/DL (ref 5.8–7.6)

## 2022-08-19 RX ORDER — HEPARIN 100 UNIT/ML
500 SYRINGE INTRAVENOUS
Status: CANCELLED | OUTPATIENT
Start: 2022-08-25

## 2022-08-19 RX ORDER — SODIUM CHLORIDE 0.9 % (FLUSH) 0.9 %
10 SYRINGE (ML) INJECTION
Status: CANCELLED | OUTPATIENT
Start: 2022-08-25

## 2022-08-25 ENCOUNTER — INFUSION (OUTPATIENT)
Dept: INFUSION THERAPY | Facility: HOSPITAL | Age: 72
End: 2022-08-25
Attending: INTERNAL MEDICINE
Payer: MEDICARE

## 2022-08-25 ENCOUNTER — OFFICE VISIT (OUTPATIENT)
Dept: HEMATOLOGY/ONCOLOGY | Facility: CLINIC | Age: 72
End: 2022-08-25
Payer: MEDICARE

## 2022-08-25 VITALS
WEIGHT: 260 LBS | HEART RATE: 54 BPM | HEIGHT: 66 IN | TEMPERATURE: 98 F | OXYGEN SATURATION: 94 % | DIASTOLIC BLOOD PRESSURE: 79 MMHG | SYSTOLIC BLOOD PRESSURE: 171 MMHG | BODY MASS INDEX: 41.78 KG/M2

## 2022-08-25 DIAGNOSIS — D05.11 BREAST NEOPLASM, TIS (DCIS), RIGHT: ICD-10-CM

## 2022-08-25 DIAGNOSIS — C18.2 MALIGNANT NEOPLASM OF ASCENDING COLON: ICD-10-CM

## 2022-08-25 DIAGNOSIS — D64.9 ANEMIA, UNSPECIFIED TYPE: Primary | ICD-10-CM

## 2022-08-25 PROCEDURE — A4216 STERILE WATER/SALINE, 10 ML: HCPCS | Performed by: INTERNAL MEDICINE

## 2022-08-25 PROCEDURE — 99999 PR PBB SHADOW E&M-EST. PATIENT-LVL IV: CPT | Mod: PBBFAC,,, | Performed by: INTERNAL MEDICINE

## 2022-08-25 PROCEDURE — 63600175 PHARM REV CODE 636 W HCPCS: Performed by: INTERNAL MEDICINE

## 2022-08-25 PROCEDURE — 99214 PR OFFICE/OUTPT VISIT, EST, LEVL IV, 30-39 MIN: ICD-10-PCS | Mod: S$PBB,,, | Performed by: INTERNAL MEDICINE

## 2022-08-25 PROCEDURE — 99214 OFFICE O/P EST MOD 30 MIN: CPT | Mod: PBBFAC | Performed by: INTERNAL MEDICINE

## 2022-08-25 PROCEDURE — 99999 PR PBB SHADOW E&M-EST. PATIENT-LVL IV: ICD-10-PCS | Mod: PBBFAC,,, | Performed by: INTERNAL MEDICINE

## 2022-08-25 PROCEDURE — 96523 IRRIG DRUG DELIVERY DEVICE: CPT

## 2022-08-25 PROCEDURE — 99214 OFFICE O/P EST MOD 30 MIN: CPT | Mod: S$PBB,,, | Performed by: INTERNAL MEDICINE

## 2022-08-25 PROCEDURE — 25000003 PHARM REV CODE 250: Performed by: INTERNAL MEDICINE

## 2022-08-25 RX ORDER — SODIUM CHLORIDE 0.9 % (FLUSH) 0.9 %
10 SYRINGE (ML) INJECTION
Status: DISCONTINUED | OUTPATIENT
Start: 2022-08-25 | End: 2022-08-25 | Stop reason: HOSPADM

## 2022-08-25 RX ORDER — HEPARIN 100 UNIT/ML
500 SYRINGE INTRAVENOUS
Status: DISCONTINUED | OUTPATIENT
Start: 2022-08-25 | End: 2022-08-25 | Stop reason: HOSPADM

## 2022-08-25 RX ORDER — HEPARIN 100 UNIT/ML
500 SYRINGE INTRAVENOUS
Status: CANCELLED | OUTPATIENT
Start: 2022-10-20

## 2022-08-25 RX ORDER — SODIUM CHLORIDE 0.9 % (FLUSH) 0.9 %
10 SYRINGE (ML) INJECTION
Status: CANCELLED | OUTPATIENT
Start: 2022-10-20

## 2022-08-25 RX ADMIN — SODIUM CHLORIDE, PRESERVATIVE FREE 10 ML: 5 INJECTION INTRAVENOUS at 10:08

## 2022-08-25 RX ADMIN — HEPARIN 500 UNITS: 100 SYRINGE at 10:08

## 2022-08-25 NOTE — PROGRESS NOTES
HEMATOLOGY/ONCOLOGY OFFICE CLINIC VISIT    Visit Information:    Initial Consultation:8/8/18  Referring Physician:Dr Caraballo  Other Physicians:  Code Status:Not addressed    Diagnosis/Problem list:   T2N1Mx Stage III colon cancer --Dx 6/18  [id number] - Stage I Right Breast Cancer (Dx 2007) s/p 5 years of Femara    Present Treatment:  Surveillance    Treatment history:    Right breast lumpectomy and axillary lymph node dissection (12/5/2007)  Femara x 5 years  Xeloda + Oxaloplatin X 4 CYCLES (12 WEEKS) (8/28/18-10/30/18)     Plan of care: Surveillance    Imaging:  PET/CT 8/15/18: Previous right hemicolectomy with enterocolonic anastomosis about the hepatic fracture without local hypermetabolic soft tissue proliferation. Additional suture lines involve the left aspect of the transverse colon close to the the midline which is surrounded by inflammatory groundglass complex mostly along the posterior aspect which covers an approximate area of 3.2 x 2.4 cm. This inflammatory complex is without appearance of a typical mass lesion though is hypermetabolic with SUV of 5.43. Remaining colon is remarkable for noninflamed diverticulosis coli. There is no free fluid. There are no mesenteric or retroperitoneal periaortic hypermetabolic enlarged lymph nodes.  No metastatic lymphadenopathy or solid organs involvement.   CT A/P 11/20/18:liver is normal in size and contour, tiny focal hypodensity  right hepatic lobe, unchanged, measuring 3 mm. No suggestion of recurrent disease. Stable NAVDEEP 4.5 mm nodule.  CTC/A/P 12/2/19: 4 mm pulmonary nodule in the NAVDEEP. liver is diffusely hypodense suggestive of hepatic steatosis. right hemicolectomy with intact anastomotic sutures. No evidence for metastatic disease.  CT C/A/P 6/14/21:No new suspicious findings chest, abdomen or pelvis. Diverticulitis with mild sigmoid inflammation, correlate clinically for diverticulitis.  CT C/A/P 12/7/2021: Stable 4 mmLUL nodule 2 millimetric left hepatic  hypodensities stable. No new or worsening malignant findings identified  CT C/A/P 7/13/22: No evidence of residual or recurrent disease seen, Chronic scarring in the lingula and right lower lobe, Stable appearing left renal cyst and area of cortical calcification right kidney, Hepatic steatosis    MMG 1/24/2013: Birads cat 2: benign.  MMG 1/23/2015: Birads cat 2: benign.  MMG 1/25/2016: Birads cat 2: benign.  MMG 6/7/2021:  Birads cat 2: benign. She does mammogram [facility]       Pathology:  12/7/2007, Right breast, lumpectomy:  1. Ductal carcinoma in situ, high nuclear grade, with comedo necrosis and foci of microinvasion.  2. Tumor extends into lobules.  3. Surgical margins of excision free of tumor.  4. Non-neoplastic breast parenchyma demonstrates fibrocystic changes without atypia.  Right axillary node dissection:19 of 19 lymph node negative for metastatic adeno carcinoma  ER > 50%, MN > 50%, Her 2 Adeola 0, neg    5/30/2018:   Right colon mass biopsy:  Adeno carcinoma, moderately differentiated  Sigmoid colon polyp: Hyperplastic polyp early formation    6/20/2018: right colon hemicolectomy, including segment of terminal ileum:  Invasive colonic adenocarcinoma, grade 2.  Tumor measures 3.17 m in maximum dimension.  Tumor focally extends into, but not beyond the muscularis propria.  Surgical margins free of tumor.  Appendix free of tumor and transmural cute inflammation.  Diverticulosis.  2/17 pericolonic lymph nodes positive for metastatic colonic adenocarcinoma.        CLINICAL HISTORY:       Patient: Ms. Mae is a 73 y/o female with a hsitory of DCIS that went for a regular followup with Dr Shane and routine blood counts showed anemia. She was referred to GI and first colonoscopy on 5/29/18 showed mass in her  proximal ascending colon on the opposite side of the lumen from the ileocecal valve. Pathology revealed adenocarcinoma, moderately differentiated.     On 6/20/2018, she underwent right colon,  "hemicolectomy, including segment of terminal ileum. Pathology revealed invasive colonic adenocarcinoma, grade 2 measuring 3.1 cm in maximum dimension extending into but not beyond the muscularis papaya with surgical margins free of tumor. 2 of 17 pericolonic lymph nodes were positive for metastatic colonic adenocarcinoma. Dr. Jarrod Caraballo performed her surgery. CEA prior to surgery was 178. Preop CT abdomen and pelvis showed right lower quadrant mesenteric mass and no other signs of metastatic disease.     According to [facility] documentation from 3/15/2018, patient is up-to-date with her annual mammograms although we will have to request this documentation.     Chief Complaint: No Concerns today      Interval History:  ** She completed adjuvant XELOX on 10/30/2018. ** Last colonoscopy in 9/28/2021 by Dr Garcia- diverticulitis.     Patient presents to clinic today for follow up in surveillance for colon and breast cancer and to discuss lab results. She cannot remember if she had annual mammogram this year in June, they are usually ordered by her PCP and done at [facility]. She is doing well. Denies fever, chills, sweats. No chest pain or shortness of breath. No  blood in urine or stools. No changes in bowel pattern. Labs reviewed.    ROS:  All 14 points ROS taken and positive as per Interval History, all other negative.    Histories:  PMH/PSH/FH/SOCIAL/ALLERGIES AND MEDS REVIEWED AND UPDATED AS APPROPRIATE       Vitals:    08/25/22 1003   BP: (!) 171/79   BP Location: Left arm   Patient Position: Sitting   Pulse: (!) 54   Temp: 98.1 °F (36.7 °C)   SpO2: (!) 94%   Weight: 117.9 kg (260 lb)   Height: 5' 6" (1.676 m)      Physical Exam  Vitals and nursing note reviewed.   Constitutional:       General: She is not in acute distress.     Appearance: Normal appearance. She is well-developed.   HENT:      Head: Normocephalic and atraumatic.      Mouth/Throat:      Mouth: Mucous membranes are moist.   Eyes:      General: No " scleral icterus.     Extraocular Movements: Extraocular movements intact.      Conjunctiva/sclera: Conjunctivae normal.      Pupils: Pupils are equal, round, and reactive to light.   Neck:      Vascular: No JVD.   Cardiovascular:      Rate and Rhythm: Normal rate and regular rhythm.      Heart sounds: No murmur heard.  Pulmonary:      Effort: Pulmonary effort is normal.      Breath sounds: Normal breath sounds. No wheezing or rhonchi.   Chest:      Chest wall: No deformity or tenderness.   Breasts:      Right: No axillary adenopathy or supraclavicular adenopathy.      Left: No axillary adenopathy or supraclavicular adenopathy.       Abdominal:      General: Bowel sounds are normal. There is no distension.      Palpations: Abdomen is soft. There is no mass.      Tenderness: There is no abdominal tenderness.   Musculoskeletal:         General: No swelling or deformity.      Cervical back: Neck supple.   Lymphadenopathy:      Cervical: No cervical adenopathy.      Upper Body:      Right upper body: No supraclavicular or axillary adenopathy.      Left upper body: No supraclavicular or axillary adenopathy.      Lower Body: No right inguinal adenopathy. No left inguinal adenopathy.   Skin:     General: Skin is warm.      Coloration: Skin is not jaundiced.      Findings: No lesion or rash.      Nails: There is no clubbing.   Neurological:      General: No focal deficit present.      Mental Status: She is alert and oriented to person, place, and time.      Sensory: Sensation is intact.      Motor: Motor function is intact.      Gait: Gait is intact.   Psychiatric:         Attention and Perception: Attention normal.         Mood and Affect: Mood and affect normal.         Speech: Speech normal.         Behavior: Behavior is cooperative.         Thought Content: Thought content normal.         Cognition and Memory: Cognition normal.         Judgment: Judgment normal.       ECOG SCORE           Laboratory:  CBC with  Differential:  Lab Results   Component Value Date    WBC 8.4 08/02/2022    RBC 4.06 (L) 08/02/2022    HGB 11.6 (L) 08/02/2022    HCT 36.3 (L) 08/02/2022    MCV 89.4 08/02/2022    MCH 28.6 08/02/2022    MCHC 32.0 (L) 08/02/2022    RDW 13.1 08/02/2022     08/02/2022    MPV 9.0 08/02/2022        CMP:  Sodium Level   Date Value Ref Range Status   08/02/2022 139 136 - 145 mmol/L Final     Potassium Level   Date Value Ref Range Status   08/02/2022 5.0 3.5 - 5.1 mmol/L Final     Carbon Dioxide   Date Value Ref Range Status   08/02/2022 27 23 - 31 mmol/L Final     Blood Urea Nitrogen   Date Value Ref Range Status   08/02/2022 31.3 (H) 9.8 - 20.1 mg/dL Final     Creatinine   Date Value Ref Range Status   08/02/2022 1.11 (H) 0.55 - 1.02 mg/dL Final     Calcium Level Total   Date Value Ref Range Status   08/02/2022 9.5 8.4 - 10.2 mg/dL Final     Albumin Level   Date Value Ref Range Status   08/02/2022 3.8 3.4 - 4.8 gm/dL Final   08/02/2022 3.8 3.4 - 4.8 gm/dL Final     Bilirubin Total   Date Value Ref Range Status   08/02/2022 0.6 <=1.5 mg/dL Final     Alkaline Phosphatase   Date Value Ref Range Status   08/02/2022 102 40 - 150 unit/L Final     Aspartate Aminotransferase   Date Value Ref Range Status   08/02/2022 15 5 - 34 unit/L Final     Alanine Aminotransferase   Date Value Ref Range Status   08/02/2022 19 0 - 55 unit/L Final     Estimated GFR-Non    Date Value Ref Range Status   11/08/2021 52 mL/min/1.73 m2 Final             Assessment:       1. Malignant neoplasm of ascending colon    2. Breast neoplasm, Tis (DCIS), right      1) Stage III colon cancer status post 3 months of adjuvant chemotherapy with XELOX.-- Dx 6/20/2018   Colonoscopy in 7/2019-- s/p polypectomy x 2. Repeated 8/2021- diverticulitis.  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 No clinical evidence of disease. CEA normal.   Will continue surveillance.   RTC in 6 months with labs  Labs: CBC, CMP, CEA  Will order CT C/A/P Q 6 Months  Continue oral Iron supplement  Okay to stop Vit B 12 supplement  From my standpoint, may have mediport removed. Will defer surgeon referral to PCP for removal    Encouraged to call for any questions or problems  The patient was given ample opportunity to ask questions and they were all answered to satisfaction; patient demonstrated understanding of what we discussed and is agreeable to the plan.        CORAL AGUILA MD      Professional Services   I, Amaya Armas LPN, acted solely as a scribe for and in the presence of Dr. Coral Aguila, who performed these services.

## 2022-09-18 ENCOUNTER — HISTORICAL (OUTPATIENT)
Dept: ADMINISTRATIVE | Facility: HOSPITAL | Age: 72
End: 2022-09-18
Payer: MEDICARE

## 2022-09-19 ENCOUNTER — HISTORICAL (OUTPATIENT)
Dept: ADMINISTRATIVE | Facility: HOSPITAL | Age: 72
End: 2022-09-19
Payer: MEDICARE

## 2022-12-16 ENCOUNTER — INFUSION (OUTPATIENT)
Dept: INFUSION THERAPY | Facility: HOSPITAL | Age: 72
End: 2022-12-16
Attending: INTERNAL MEDICINE
Payer: MEDICARE

## 2022-12-16 VITALS
DIASTOLIC BLOOD PRESSURE: 79 MMHG | HEART RATE: 60 BPM | OXYGEN SATURATION: 98 % | TEMPERATURE: 98 F | SYSTOLIC BLOOD PRESSURE: 155 MMHG

## 2022-12-16 DIAGNOSIS — Z95.828 PORT-A-CATH IN PLACE: Primary | ICD-10-CM

## 2022-12-16 DIAGNOSIS — D64.9 ANEMIA, UNSPECIFIED TYPE: Primary | ICD-10-CM

## 2022-12-16 DIAGNOSIS — C18.2 MALIGNANT NEOPLASM OF ASCENDING COLON: ICD-10-CM

## 2022-12-16 PROCEDURE — 63600175 PHARM REV CODE 636 W HCPCS: Performed by: INTERNAL MEDICINE

## 2022-12-16 PROCEDURE — 96523 IRRIG DRUG DELIVERY DEVICE: CPT

## 2022-12-16 RX ORDER — SODIUM CHLORIDE 0.9 % (FLUSH) 0.9 %
10 SYRINGE (ML) INJECTION
Status: CANCELLED | OUTPATIENT
Start: 2023-02-10

## 2022-12-16 RX ORDER — HEPARIN 100 UNIT/ML
500 SYRINGE INTRAVENOUS
Status: DISCONTINUED | OUTPATIENT
Start: 2022-12-16 | End: 2022-12-16 | Stop reason: HOSPADM

## 2022-12-16 RX ORDER — SODIUM CHLORIDE 0.9 % (FLUSH) 0.9 %
10 SYRINGE (ML) INJECTION
Status: DISCONTINUED | OUTPATIENT
Start: 2022-12-16 | End: 2022-12-16 | Stop reason: HOSPADM

## 2022-12-16 RX ORDER — HEPARIN 100 UNIT/ML
500 SYRINGE INTRAVENOUS
Status: CANCELLED | OUTPATIENT
Start: 2023-02-10

## 2022-12-16 RX ADMIN — HEPARIN 500 UNITS: 100 SYRINGE at 11:12

## 2022-12-16 NOTE — PLAN OF CARE
Pt received mp flush, tolerated well.  Pt asking about mp removal, seems to have been discussed recently Aug 2022 and cleared with emily Albert LPN to help facilitate process with pt.

## 2023-01-03 ENCOUNTER — DOCUMENTATION ONLY (OUTPATIENT)
Dept: INTERNAL MEDICINE | Facility: CLINIC | Age: 73
End: 2023-01-03
Payer: MEDICARE

## 2023-01-05 ENCOUNTER — HOSPITAL ENCOUNTER (OUTPATIENT)
Dept: RADIOLOGY | Facility: HOSPITAL | Age: 73
Discharge: HOME | End: 2023-01-05
Attending: INTERNAL MEDICINE
Payer: MEDICARE

## 2023-01-05 DIAGNOSIS — C18.2 MALIGNANT NEOPLASM OF ASCENDING COLON: ICD-10-CM

## 2023-01-05 DIAGNOSIS — D05.11 BREAST NEOPLASM, TIS (DCIS), RIGHT: ICD-10-CM

## 2023-01-05 LAB
CREAT SERPL-MCNC: 1 MG/DL (ref 0.5–1.4)
SAMPLE: NORMAL

## 2023-01-05 PROCEDURE — 74177 CT ABD & PELVIS W/CONTRAST: CPT | Mod: TC

## 2023-01-05 PROCEDURE — 71260 CT THORAX DX C+: CPT | Mod: TC

## 2023-01-05 PROCEDURE — 25500020 PHARM REV CODE 255: Performed by: INTERNAL MEDICINE

## 2023-01-05 RX ADMIN — IOPAMIDOL 100 ML: 755 INJECTION, SOLUTION INTRAVENOUS at 02:01

## 2023-03-09 ENCOUNTER — LAB VISIT (OUTPATIENT)
Dept: LAB | Facility: HOSPITAL | Age: 73
End: 2023-03-09
Attending: INTERNAL MEDICINE
Payer: MEDICARE

## 2023-03-09 DIAGNOSIS — C18.2 MALIGNANT NEOPLASM OF ASCENDING COLON: ICD-10-CM

## 2023-03-09 DIAGNOSIS — D05.11 BREAST NEOPLASM, TIS (DCIS), RIGHT: ICD-10-CM

## 2023-03-09 LAB
ALBUMIN SERPL-MCNC: 3.6 G/DL (ref 3.4–4.8)
ALBUMIN/GLOB SERPL: 0.9 RATIO (ref 1.1–2)
ALP SERPL-CCNC: 91 UNIT/L (ref 40–150)
ALT SERPL-CCNC: 12 UNIT/L (ref 0–55)
AST SERPL-CCNC: 15 UNIT/L (ref 5–34)
BASOPHILS # BLD AUTO: 0.03 X10(3)/MCL (ref 0–0.2)
BASOPHILS NFR BLD AUTO: 0.4 %
BILIRUBIN DIRECT+TOT PNL SERPL-MCNC: 0.4 MG/DL
BUN SERPL-MCNC: 31.8 MG/DL (ref 9.8–20.1)
CALCIUM SERPL-MCNC: 9.4 MG/DL (ref 8.4–10.2)
CEA SERPL-MCNC: <1.73 NG/ML (ref 0–3)
CHLORIDE SERPL-SCNC: 110 MMOL/L (ref 98–107)
CO2 SERPL-SCNC: 22 MMOL/L (ref 23–31)
CREAT SERPL-MCNC: 1.4 MG/DL (ref 0.55–1.02)
EOSINOPHIL # BLD AUTO: 0.26 X10(3)/MCL (ref 0–0.9)
EOSINOPHIL NFR BLD AUTO: 3.3 %
ERYTHROCYTE [DISTWIDTH] IN BLOOD BY AUTOMATED COUNT: 13.2 % (ref 11.5–17)
GFR SERPLBLD CREATININE-BSD FMLA CKD-EPI: 40 MLS/MIN/1.73/M2
GLOBULIN SER-MCNC: 3.9 GM/DL (ref 2.4–3.5)
GLUCOSE SERPL-MCNC: 152 MG/DL (ref 82–115)
HCT VFR BLD AUTO: 36.1 % (ref 37–47)
HGB BLD-MCNC: 10.9 G/DL (ref 12–16)
IMM GRANULOCYTES # BLD AUTO: 0.03 X10(3)/MCL (ref 0–0.04)
IMM GRANULOCYTES NFR BLD AUTO: 0.4 %
LYMPHOCYTES # BLD AUTO: 2.55 X10(3)/MCL (ref 0.6–4.6)
LYMPHOCYTES NFR BLD AUTO: 32.1 %
MCH RBC QN AUTO: 27.9 PG
MCHC RBC AUTO-ENTMCNC: 30.2 G/DL (ref 33–36)
MCV RBC AUTO: 92.3 FL (ref 80–94)
MONOCYTES # BLD AUTO: 0.71 X10(3)/MCL (ref 0.1–1.3)
MONOCYTES NFR BLD AUTO: 8.9 %
NEUTROPHILS # BLD AUTO: 4.36 X10(3)/MCL (ref 2.1–9.2)
NEUTROPHILS NFR BLD AUTO: 54.9 %
PLATELET # BLD AUTO: 272 X10(3)/MCL (ref 130–400)
PMV BLD AUTO: 9.3 FL (ref 7.4–10.4)
POTASSIUM SERPL-SCNC: 4.6 MMOL/L (ref 3.5–5.1)
PROT SERPL-MCNC: 7.5 GM/DL (ref 5.8–7.6)
RBC # BLD AUTO: 3.91 X10(6)/MCL (ref 4.2–5.4)
SODIUM SERPL-SCNC: 140 MMOL/L (ref 136–145)
WBC # SPEC AUTO: 7.9 X10(3)/MCL (ref 4.5–11.5)

## 2023-03-09 PROCEDURE — 80053 COMPREHEN METABOLIC PANEL: CPT

## 2023-03-09 PROCEDURE — 85025 COMPLETE CBC W/AUTO DIFF WBC: CPT

## 2023-03-09 PROCEDURE — 82378 CARCINOEMBRYONIC ANTIGEN: CPT

## 2023-03-09 PROCEDURE — 36415 COLL VENOUS BLD VENIPUNCTURE: CPT

## 2023-03-21 DIAGNOSIS — I51.7 CARDIOMEGALY: Primary | ICD-10-CM

## 2023-03-30 ENCOUNTER — HOSPITAL ENCOUNTER (OUTPATIENT)
Dept: CARDIOLOGY | Facility: HOSPITAL | Age: 73
Discharge: HOME | End: 2023-03-30
Attending: INTERNAL MEDICINE
Payer: MEDICARE

## 2023-03-30 DIAGNOSIS — I51.7 CARDIOMEGALY: ICD-10-CM

## 2023-03-30 LAB
AV INDEX (PROSTH): 0.75
AV MEAN GRADIENT: 6 MMHG
AV PEAK GRADIENT: 13 MMHG
AV VALVE AREA: 2.34 CM2
AV VELOCITY RATIO: 0.75
CV ECHO LV RWT: 0.47 CM
DOP CALC AO PEAK VEL: 1.78 M/S
DOP CALC AO VTI: 40.8 CM
DOP CALC LVOT AREA: 3.1 CM2
DOP CALC LVOT DIAMETER: 2 CM
DOP CALC LVOT PEAK VEL: 1.33 M/S
DOP CALC LVOT STROKE VOLUME: 95.46 CM3
DOP CALC MV VTI: 39.5 CM
DOP CALCLVOT PEAK VEL VTI: 30.4 CM
E WAVE DECELERATION TIME: 217 MSEC
E/A RATIO: 0.94
E/E' RATIO: 6.83 M/S
ECHO LV POSTERIOR WALL: 1.21 CM (ref 0.6–1.1)
EJECTION FRACTION: 62 %
FRACTIONAL SHORTENING: 40 % (ref 28–44)
INTERVENTRICULAR SEPTUM: 1.02 CM (ref 0.6–1.1)
LEFT ATRIUM SIZE: 4.5 CM
LEFT ATRIUM VOLUME MOD: 69.9 CM3
LEFT INTERNAL DIMENSION IN SYSTOLE: 3.08 CM (ref 2.1–4)
LEFT VENTRICLE DIASTOLIC VOLUME: 126 ML
LEFT VENTRICLE SYSTOLIC VOLUME: 37.3 ML
LEFT VENTRICULAR INTERNAL DIMENSION IN DIASTOLE: 5.13 CM (ref 3.5–6)
LEFT VENTRICULAR MASS: 219.98 G
LV LATERAL E/E' RATIO: 6.31 M/S
LV SEPTAL E/E' RATIO: 7.45 M/S
LVOT MG: 4 MMHG
LVOT MV: 0.85 CM/S
MV MEAN GRADIENT: 1 MMHG
MV PEAK A VEL: 0.87 M/S
MV PEAK E VEL: 0.82 M/S
MV PEAK GRADIENT: 4 MMHG
MV STENOSIS PRESSURE HALF TIME: 52 MS
MV VALVE AREA BY CONTINUITY EQUATION: 2.42 CM2
MV VALVE AREA P 1/2 METHOD: 4.23 CM2
PISA TR MAX VEL: 2.87 M/S
PV PEAK VELOCITY: 1.15 CM/S
RA PRESSURE: 3 MMHG
TDI LATERAL: 0.13 M/S
TDI SEPTAL: 0.11 M/S
TDI: 0.12 M/S
TR MAX PG: 33 MMHG
TRICUSPID ANNULAR PLANE SYSTOLIC EXCURSION: 2.95 CM
TV REST PULMONARY ARTERY PRESSURE: 36 MMHG

## 2023-03-30 PROCEDURE — 93306 TTE W/DOPPLER COMPLETE: CPT | Mod: 26,,, | Performed by: INTERNAL MEDICINE

## 2023-03-30 PROCEDURE — 93306 ECHO (CUPID ONLY): ICD-10-PCS | Mod: 26,,, | Performed by: INTERNAL MEDICINE

## 2023-03-30 PROCEDURE — 93306 TTE W/DOPPLER COMPLETE: CPT

## 2023-04-13 ENCOUNTER — OFFICE VISIT (OUTPATIENT)
Dept: URGENT CARE | Facility: CLINIC | Age: 73
End: 2023-04-13
Payer: MEDICARE

## 2023-04-13 VITALS
DIASTOLIC BLOOD PRESSURE: 105 MMHG | HEART RATE: 61 BPM | WEIGHT: 250 LBS | OXYGEN SATURATION: 97 % | HEIGHT: 66 IN | RESPIRATION RATE: 20 BRPM | BODY MASS INDEX: 40.18 KG/M2 | TEMPERATURE: 98 F | SYSTOLIC BLOOD PRESSURE: 185 MMHG

## 2023-04-13 DIAGNOSIS — R10.9 RIGHT FLANK PAIN: ICD-10-CM

## 2023-04-13 DIAGNOSIS — R10.84 GENERALIZED ABDOMINAL PAIN: ICD-10-CM

## 2023-04-13 DIAGNOSIS — R10.9 LEFT FLANK PAIN: ICD-10-CM

## 2023-04-13 PROCEDURE — 99213 PR OFFICE/OUTPT VISIT, EST, LEVL III, 20-29 MIN: ICD-10-PCS | Mod: ,,, | Performed by: NURSE PRACTITIONER

## 2023-04-13 PROCEDURE — 99213 OFFICE O/P EST LOW 20 MIN: CPT | Mod: ,,, | Performed by: NURSE PRACTITIONER

## 2023-04-13 RX ORDER — METHYLPREDNISOLONE 4 MG/1
TABLET ORAL
Qty: 21 EACH | Refills: 0 | Status: SHIPPED | OUTPATIENT
Start: 2023-04-13 | End: 2023-04-13

## 2023-04-13 RX ORDER — METHOCARBAMOL 500 MG/1
500 TABLET, FILM COATED ORAL 2 TIMES DAILY
COMMUNITY
Start: 2022-11-01 | End: 2023-10-24

## 2023-04-13 RX ORDER — METFORMIN HYDROCHLORIDE 500 MG/1
500 TABLET ORAL 2 TIMES DAILY
COMMUNITY
Start: 2023-01-29

## 2023-04-13 RX ORDER — CETIRIZINE HYDROCHLORIDE 10 MG/1
10 TABLET ORAL NIGHTLY
COMMUNITY
Start: 2023-03-02 | End: 2023-10-24

## 2023-04-13 RX ORDER — ATORVASTATIN CALCIUM 80 MG/1
TABLET, FILM COATED ORAL
COMMUNITY
Start: 2022-10-26

## 2023-04-13 NOTE — PATIENT INSTRUCTIONS
Go to ER for nausea, constipation, abdominal pain, and left flank pain.  History of kidney stones.

## 2023-04-13 NOTE — PROGRESS NOTES
"Subjective:      Patient ID: Osiris Mae is a 72 y.o. female.    Vitals:  height is 5' 6" (1.676 m) and weight is 113.4 kg (250 lb). Her oral temperature is 98.2 °F (36.8 °C). Her blood pressure is 185/105 (abnormal) and her pulse is 61. Her respiration is 20 and oxygen saturation is 97%.     Chief Complaint: GI Problem (Nausea, possible constipation, left lower back pain, since 5:00 am)    HPI    Gastrointestinal:  Positive for abdominal pain, abdominal bloating, nausea and constipation. Negative for abdominal trauma, vomiting, diarrhea and rectal pain.   Musculoskeletal:  Positive for pain (left flank).    Objective:     Physical Exam   Constitutional: She is oriented to person, place, and time. She appears well-developed.   HENT:   Head: Normocephalic and atraumatic.   Ears:   Right Ear: External ear normal.   Left Ear: External ear normal.   Nose: Nose normal.   Mouth/Throat: Mucous membranes are normal.   Eyes: Conjunctivae and lids are normal.   Neck: Trachea normal. Neck supple.   Cardiovascular: Normal rate, regular rhythm and normal heart sounds.   Pulmonary/Chest: Effort normal and breath sounds normal. No respiratory distress.   Abdominal: Normal appearance and bowel sounds are normal. She exhibits distension. She exhibits no mass. Soft. There is abdominal tenderness (generalized). There is left CVA tenderness. There is no rebound and no guarding.   Musculoskeletal: Normal range of motion.         General: Normal range of motion.   Neurological: She is alert and oriented to person, place, and time. She has normal strength.   Skin: Skin is warm, dry, intact, not diaphoretic and not pale.   Psychiatric: Her speech is normal and behavior is normal. Judgment and thought content normal.   Nursing note and vitals reviewed.    Assessment:     1. Generalized abdominal pain    2. Right flank pain    3. Left flank pain        Plan:     Go to ER for nausea, constipation, abdominal pain, and left flank pain.  " History of kidney stones.

## 2023-04-27 ENCOUNTER — OFFICE VISIT (OUTPATIENT)
Dept: HEMATOLOGY/ONCOLOGY | Facility: CLINIC | Age: 73
End: 2023-04-27
Payer: MEDICARE

## 2023-04-27 ENCOUNTER — INFUSION (OUTPATIENT)
Dept: INFUSION THERAPY | Facility: HOSPITAL | Age: 73
End: 2023-04-27
Attending: INTERNAL MEDICINE
Payer: MEDICARE

## 2023-04-27 VITALS
OXYGEN SATURATION: 97 % | DIASTOLIC BLOOD PRESSURE: 81 MMHG | SYSTOLIC BLOOD PRESSURE: 125 MMHG | TEMPERATURE: 98 F | HEART RATE: 50 BPM | HEIGHT: 66 IN | WEIGHT: 252.38 LBS | BODY MASS INDEX: 40.56 KG/M2

## 2023-04-27 DIAGNOSIS — D64.9 ANEMIA, UNSPECIFIED TYPE: ICD-10-CM

## 2023-04-27 DIAGNOSIS — C18.2 MALIGNANT NEOPLASM OF ASCENDING COLON: Primary | ICD-10-CM

## 2023-04-27 DIAGNOSIS — D05.11 BREAST NEOPLASM, TIS (DCIS), RIGHT: ICD-10-CM

## 2023-04-27 DIAGNOSIS — D64.9 ANEMIA, UNSPECIFIED TYPE: Primary | ICD-10-CM

## 2023-04-27 PROCEDURE — 99214 PR OFFICE/OUTPT VISIT, EST, LEVL IV, 30-39 MIN: ICD-10-PCS | Mod: S$PBB,,, | Performed by: INTERNAL MEDICINE

## 2023-04-27 PROCEDURE — 96523 IRRIG DRUG DELIVERY DEVICE: CPT

## 2023-04-27 PROCEDURE — 99214 OFFICE O/P EST MOD 30 MIN: CPT | Mod: PBBFAC | Performed by: INTERNAL MEDICINE

## 2023-04-27 PROCEDURE — 63600175 PHARM REV CODE 636 W HCPCS: Performed by: INTERNAL MEDICINE

## 2023-04-27 PROCEDURE — 99214 OFFICE O/P EST MOD 30 MIN: CPT | Mod: S$PBB,,, | Performed by: INTERNAL MEDICINE

## 2023-04-27 PROCEDURE — 25000003 PHARM REV CODE 250: Performed by: INTERNAL MEDICINE

## 2023-04-27 PROCEDURE — A4216 STERILE WATER/SALINE, 10 ML: HCPCS | Performed by: INTERNAL MEDICINE

## 2023-04-27 PROCEDURE — 99999 PR PBB SHADOW E&M-EST. PATIENT-LVL IV: ICD-10-PCS | Mod: PBBFAC,,, | Performed by: INTERNAL MEDICINE

## 2023-04-27 PROCEDURE — 99999 PR PBB SHADOW E&M-EST. PATIENT-LVL IV: CPT | Mod: PBBFAC,,, | Performed by: INTERNAL MEDICINE

## 2023-04-27 RX ORDER — SODIUM CHLORIDE 0.9 % (FLUSH) 0.9 %
10 SYRINGE (ML) INJECTION
Status: CANCELLED | OUTPATIENT
Start: 2023-06-01

## 2023-04-27 RX ORDER — HEPARIN 100 UNIT/ML
500 SYRINGE INTRAVENOUS
Status: CANCELLED | OUTPATIENT
Start: 2023-06-01

## 2023-04-27 RX ORDER — HEPARIN 100 UNIT/ML
500 SYRINGE INTRAVENOUS
Status: DISCONTINUED | OUTPATIENT
Start: 2023-04-27 | End: 2023-04-27 | Stop reason: HOSPADM

## 2023-04-27 RX ORDER — SODIUM CHLORIDE 0.9 % (FLUSH) 0.9 %
10 SYRINGE (ML) INJECTION
Status: DISCONTINUED | OUTPATIENT
Start: 2023-04-27 | End: 2023-04-27 | Stop reason: HOSPADM

## 2023-04-27 RX ADMIN — Medication 500 UNITS: at 01:04

## 2023-04-27 RX ADMIN — Medication 10 ML: at 01:04

## 2023-04-27 NOTE — PLAN OF CARE
Mediport flushed without difficulty. Tolerated well. Next appt given to pt. Dc'd home in stable condition.

## 2023-04-27 NOTE — PROGRESS NOTES
HEMATOLOGY/ONCOLOGY OFFICE CLINIC VISIT    Visit Information:    Initial Consultation:8/8/18  Referring Physician:Dr Caraballo  Other Physicians:  Code Status:Not addressed    Diagnosis/Problem list:   T2N1Mx Stage III colon cancer --Dx 6/18  [id number] - Stage I Right Breast Cancer (Dx 2007) s/p 5 years of Femara    Present Treatment:  Surveillance    Treatment history:    Right breast lumpectomy and axillary lymph node dissection (12/5/2007)  Femara x 5 years  Xeloda + Oxaloplatin X 4 CYCLES (12 WEEKS) (8/28/18-10/30/18)     Plan of care: Surveillance    Imaging:  PET/CT 8/15/18: Previous right hemicolectomy with enterocolonic anastomosis about the hepatic fracture without local hypermetabolic soft tissue proliferation. Additional suture lines involve the left aspect of the transverse colon close to the the midline which is surrounded by inflammatory groundglass complex mostly along the posterior aspect which covers an approximate area of 3.2 x 2.4 cm. This inflammatory complex is without appearance of a typical mass lesion though is hypermetabolic with SUV of 5.43. Remaining colon is remarkable for noninflamed diverticulosis coli. There is no free fluid. There are no mesenteric or retroperitoneal periaortic hypermetabolic enlarged lymph nodes.  No metastatic lymphadenopathy or solid organs involvement.   CT A/P 11/20/18:liver is normal in size and contour, tiny focal hypodensity  right hepatic lobe, unchanged, measuring 3 mm. No suggestion of recurrent disease. Stable NAVDEEP 4.5 mm nodule.  CTC/A/P 12/2/19: 4 mm pulmonary nodule in the NAVDEEP. liver is diffusely hypodense suggestive of hepatic steatosis. right hemicolectomy with intact anastomotic sutures. No evidence for metastatic disease.  CT C/A/P 6/14/21:No new suspicious findings chest, abdomen or pelvis. Diverticulitis with mild sigmoid inflammation, correlate clinically for diverticulitis.  CT C/A/P 12/7/2021: Stable 4 mmLUL nodule 2 millimetric left hepatic  hypodensities stable. No new or worsening malignant findings identified  CT C/A/P 7/13/22: No evidence of residual or recurrent disease seen, Chronic scarring in the lingula and right lower lobe, Stable appearing left renal cyst and area of cortical calcification right kidney, Hepatic steatosis  CT C/A/P 1/5/2023: 1.  No metastatic findings identified.   2.  Stable chronic changes with details above.     MMG 1/24/2013: Birads cat 2: benign.  MMG 1/23/2015: Birads cat 2: benign.  MMG 1/25/2016: Birads cat 2: benign.  MMG 6/7/2021:  Birads cat 2: benign. She does mammogram [facility]       Pathology:  12/7/2007, Right breast, lumpectomy:  1. Ductal carcinoma in situ, high nuclear grade, with comedo necrosis and foci of microinvasion.  2. Tumor extends into lobules.  3. Surgical margins of excision free of tumor.  4. Non-neoplastic breast parenchyma demonstrates fibrocystic changes without atypia.  Right axillary node dissection:19 of 19 lymph node negative for metastatic adeno carcinoma  ER > 50%, ND > 50%, Her 2 Adeola 0, neg    5/30/2018:   Right colon mass biopsy:  Adeno carcinoma, moderately differentiated  Sigmoid colon polyp: Hyperplastic polyp early formation    6/20/2018: right colon hemicolectomy, including segment of terminal ileum:  Invasive colonic adenocarcinoma, grade 2.  Tumor measures 3.17 m in maximum dimension.  Tumor focally extends into, but not beyond the muscularis propria.  Surgical margins free of tumor.  Appendix free of tumor and transmural cute inflammation.  Diverticulosis.  2/17 pericolonic lymph nodes positive for metastatic colonic adenocarcinoma.        CLINICAL HISTORY:       Patient: Ms. Mae is a 73 y/o female with a hsitory of DCIS that went for a regular followup with Dr Shane and routine blood counts showed anemia. She was referred to GI and first colonoscopy on 5/29/18 showed mass in her  proximal ascending colon on the opposite side of the lumen from the ileocecal valve. Pathology  "revealed adenocarcinoma, moderately differentiated.     On 6/20/2018, she underwent right colon, hemicolectomy, including segment of terminal ileum. Pathology revealed invasive colonic adenocarcinoma, grade 2 measuring 3.1 cm in maximum dimension extending into but not beyond the muscularis papaya with surgical margins free of tumor. 2 of 17 pericolonic lymph nodes were positive for metastatic colonic adenocarcinoma. Dr. Jarrod Caraballo performed her surgery. CEA prior to surgery was 178. Preop CT abdomen and pelvis showed right lower quadrant mesenteric mass and no other signs of metastatic disease.     According to [facility] documentation from 3/15/2018, patient is up-to-date with her annual mammograms although we will have to request this documentation.     Chief Complaint: OTHER (No concerns today.)      Interval History:  ** She completed adjuvant XELOX on 10/30/2018. ** Last colonoscopy in 9/28/2021 by Dr Garcia- diverticulitis.     Patient presents to clinic today for follow up in surveillance for colon and breast cancer and to discuss CT scan and lab results. Screening mammogram done 12/2022 was benign, recommend follow up in 1 year. PCP orders mammograms.  She is doing well. Denies fever, chills, sweats. No chest pain or shortness of breath. No  blood in urine or stools. No changes in bowel pattern.   She would like to have mediport removed.    ROS:  All 14 points ROS taken and positive as per Interval History, all other negative.    Histories:  PMH/PSH/FH/SOCIAL/ALLERGIES AND MEDS REVIEWED AND UPDATED AS APPROPRIATE       Vitals:    04/27/23 1257   BP: 125/81   BP Location: Left arm   Patient Position: Sitting   Pulse: (!) 50   Temp: 97.7 °F (36.5 °C)   TempSrc: Oral   SpO2: 97%   Weight: 114.5 kg (252 lb 6.4 oz)   Height: 5' 6" (1.676 m)        Physical Exam  Vitals and nursing note reviewed.   Constitutional:       General: She is not in acute distress.     Appearance: Normal appearance. She is well-developed. "   HENT:      Head: Normocephalic and atraumatic.      Mouth/Throat:      Mouth: Mucous membranes are moist.   Eyes:      General: No scleral icterus.     Extraocular Movements: Extraocular movements intact.      Conjunctiva/sclera: Conjunctivae normal.      Pupils: Pupils are equal, round, and reactive to light.   Neck:      Vascular: No JVD.   Cardiovascular:      Rate and Rhythm: Normal rate and regular rhythm.      Heart sounds: No murmur heard.  Pulmonary:      Effort: Pulmonary effort is normal.      Breath sounds: Normal breath sounds. No wheezing or rhonchi.   Chest:      Chest wall: No deformity or tenderness.   Abdominal:      General: Bowel sounds are normal. There is no distension.      Palpations: Abdomen is soft. There is no mass.      Tenderness: There is no abdominal tenderness.   Musculoskeletal:         General: No swelling or deformity.      Cervical back: Neck supple.   Lymphadenopathy:      Cervical: No cervical adenopathy.      Upper Body:      Right upper body: No supraclavicular or axillary adenopathy.      Left upper body: No supraclavicular or axillary adenopathy.      Lower Body: No right inguinal adenopathy. No left inguinal adenopathy.   Skin:     General: Skin is warm.      Coloration: Skin is not jaundiced.      Findings: No lesion or rash.      Nails: There is no clubbing.   Neurological:      General: No focal deficit present.      Mental Status: She is alert and oriented to person, place, and time.      Sensory: Sensation is intact.      Motor: Motor function is intact.      Gait: Gait is intact.   Psychiatric:         Attention and Perception: Attention normal.         Mood and Affect: Mood and affect normal.         Speech: Speech normal.         Behavior: Behavior is cooperative.         Thought Content: Thought content normal.         Cognition and Memory: Cognition normal.         Judgment: Judgment normal.     ECOG SCORE    0 - Fully active-able to carry on all pre-disease  performance without restriction         Laboratory:  CBC with Differential:  Lab Results   Component Value Date    WBC 7.9 03/09/2023    RBC 3.91 (L) 03/09/2023    HGB 10.9 (L) 03/09/2023    HCT 36.1 (L) 03/09/2023    MCV 92.3 03/09/2023    MCH 27.9 03/09/2023    MCHC 30.2 (L) 03/09/2023    RDW 13.2 03/09/2023     03/09/2023    MPV 9.3 03/09/2023        CMP:  Sodium Level   Date Value Ref Range Status   03/09/2023 140 136 - 145 mmol/L Final     Potassium Level   Date Value Ref Range Status   03/09/2023 4.6 3.5 - 5.1 mmol/L Final     Carbon Dioxide   Date Value Ref Range Status   03/09/2023 22 (L) 23 - 31 mmol/L Final     Blood Urea Nitrogen   Date Value Ref Range Status   03/09/2023 31.8 (H) 9.8 - 20.1 mg/dL Final     Creatinine   Date Value Ref Range Status   03/09/2023 1.40 (H) 0.55 - 1.02 mg/dL Final     Calcium Level Total   Date Value Ref Range Status   03/09/2023 9.4 8.4 - 10.2 mg/dL Final     Albumin Level   Date Value Ref Range Status   03/09/2023 3.6 3.4 - 4.8 g/dL Final     Bilirubin Total   Date Value Ref Range Status   03/09/2023 0.4 <=1.5 mg/dL Final     Alkaline Phosphatase   Date Value Ref Range Status   03/09/2023 91 40 - 150 unit/L Final     Aspartate Aminotransferase   Date Value Ref Range Status   03/09/2023 15 5 - 34 unit/L Final     Alanine Aminotransferase   Date Value Ref Range Status   03/09/2023 12 0 - 55 unit/L Final     Estimated GFR-Non    Date Value Ref Range Status   11/08/2021 52 mL/min/1.73 m2 Final         Assessment:       1) Stage III colon cancer status post 3 months of adjuvant chemotherapy with XELOX.-- Dx 6/20/2018   Colonoscopy in 7/2019-- s/p polypectomy x 2. Repeated 8/2021- diverticulitis.  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 No clinical evidence of disease. CEA normal. Will continue surveillance.  RTC in 6 months with labs: CBC, CMP, CEA, Iron studies  Will order CT C/A/P annually - will order next visit  Mammograms ordered by PCP  Continue oral Iron supplement  From my standpoint, may have mediport removed. Will defer surgeon referral to PCP for removal  Referral previously sent to Dr. Caraballo - office number given to patient  Will continue port flushes every 2-3 months until it is removed    Encouraged to call for any questions or problems  The patient was given ample opportunity to ask questions and they were all answered to satisfaction; patient demonstrated understanding of what we discussed and is agreeable to the plan.      CORAL AGUILA MD      Professional Services   I, Amaya Armas LPN, acted solely as a scribe for and in the presence of Dr. Coral Aguila, who performed these services.

## 2023-07-27 ENCOUNTER — INFUSION (OUTPATIENT)
Dept: INFUSION THERAPY | Facility: HOSPITAL | Age: 73
End: 2023-07-27
Attending: INTERNAL MEDICINE
Payer: MEDICARE

## 2023-07-27 VITALS
TEMPERATURE: 98 F | SYSTOLIC BLOOD PRESSURE: 111 MMHG | DIASTOLIC BLOOD PRESSURE: 66 MMHG | HEART RATE: 65 BPM | RESPIRATION RATE: 18 BRPM

## 2023-07-27 DIAGNOSIS — D64.9 ANEMIA, UNSPECIFIED TYPE: Primary | ICD-10-CM

## 2023-07-27 PROCEDURE — A4216 STERILE WATER/SALINE, 10 ML: HCPCS | Performed by: INTERNAL MEDICINE

## 2023-07-27 PROCEDURE — 96523 IRRIG DRUG DELIVERY DEVICE: CPT

## 2023-07-27 PROCEDURE — 63600175 PHARM REV CODE 636 W HCPCS: Performed by: INTERNAL MEDICINE

## 2023-07-27 PROCEDURE — 25000003 PHARM REV CODE 250: Performed by: INTERNAL MEDICINE

## 2023-07-27 RX ORDER — HEPARIN 100 UNIT/ML
500 SYRINGE INTRAVENOUS
Status: CANCELLED | OUTPATIENT
Start: 2023-08-17

## 2023-07-27 RX ORDER — SODIUM CHLORIDE 0.9 % (FLUSH) 0.9 %
10 SYRINGE (ML) INJECTION
Status: DISCONTINUED | OUTPATIENT
Start: 2023-07-27 | End: 2023-07-27 | Stop reason: HOSPADM

## 2023-07-27 RX ORDER — HEPARIN 100 UNIT/ML
500 SYRINGE INTRAVENOUS
Status: DISCONTINUED | OUTPATIENT
Start: 2023-07-27 | End: 2023-07-27 | Stop reason: HOSPADM

## 2023-07-27 RX ORDER — SODIUM CHLORIDE 0.9 % (FLUSH) 0.9 %
10 SYRINGE (ML) INJECTION
Status: CANCELLED | OUTPATIENT
Start: 2023-08-17

## 2023-07-27 RX ADMIN — HEPARIN 500 UNITS: 100 SYRINGE at 01:07

## 2023-07-27 RX ADMIN — SODIUM CHLORIDE, PRESERVATIVE FREE 10 ML: 5 INJECTION INTRAVENOUS at 01:07

## 2023-07-27 NOTE — PLAN OF CARE
Plan of care reviewed with patient; patient in agreement. MPF tolerated. Appts printed and given to patient.

## 2023-10-17 RX ORDER — HEPARIN 100 UNIT/ML
500 SYRINGE INTRAVENOUS
Status: CANCELLED | OUTPATIENT
Start: 2023-10-18

## 2023-10-17 RX ORDER — SODIUM CHLORIDE 0.9 % (FLUSH) 0.9 %
10 SYRINGE (ML) INJECTION
Status: CANCELLED | OUTPATIENT
Start: 2023-10-18

## 2023-10-18 ENCOUNTER — INFUSION (OUTPATIENT)
Dept: INFUSION THERAPY | Facility: HOSPITAL | Age: 73
End: 2023-10-18
Attending: INTERNAL MEDICINE
Payer: MEDICARE

## 2023-10-18 ENCOUNTER — LAB VISIT (OUTPATIENT)
Dept: LAB | Facility: HOSPITAL | Age: 73
End: 2023-10-18
Attending: INTERNAL MEDICINE
Payer: MEDICARE

## 2023-10-18 VITALS
BODY MASS INDEX: 37.82 KG/M2 | RESPIRATION RATE: 18 BRPM | HEIGHT: 65 IN | HEART RATE: 68 BPM | TEMPERATURE: 98 F | OXYGEN SATURATION: 99 % | DIASTOLIC BLOOD PRESSURE: 76 MMHG | SYSTOLIC BLOOD PRESSURE: 122 MMHG | WEIGHT: 227 LBS

## 2023-10-18 DIAGNOSIS — D64.9 ANEMIA, UNSPECIFIED TYPE: Primary | ICD-10-CM

## 2023-10-18 DIAGNOSIS — D05.11 BREAST NEOPLASM, TIS (DCIS), RIGHT: ICD-10-CM

## 2023-10-18 DIAGNOSIS — C18.2 MALIGNANT NEOPLASM OF ASCENDING COLON: ICD-10-CM

## 2023-10-18 DIAGNOSIS — D64.9 ANEMIA, UNSPECIFIED TYPE: ICD-10-CM

## 2023-10-18 LAB
ALBUMIN SERPL-MCNC: 3.8 G/DL (ref 3.4–4.8)
ALBUMIN/GLOB SERPL: 1 RATIO (ref 1.1–2)
ALP SERPL-CCNC: 76 UNIT/L (ref 40–150)
ALT SERPL-CCNC: 10 UNIT/L (ref 0–55)
AST SERPL-CCNC: 14 UNIT/L (ref 5–34)
BASOPHILS # BLD AUTO: 0.05 X10(3)/MCL
BASOPHILS NFR BLD AUTO: 0.8 %
BILIRUB SERPL-MCNC: 0.3 MG/DL
BUN SERPL-MCNC: 34.7 MG/DL (ref 9.8–20.1)
CALCIUM SERPL-MCNC: 9.8 MG/DL (ref 8.4–10.2)
CEA SERPL-MCNC: <1.73 NG/ML (ref 0–3)
CHLORIDE SERPL-SCNC: 107 MMOL/L (ref 98–107)
CO2 SERPL-SCNC: 25 MMOL/L (ref 23–31)
CREAT SERPL-MCNC: 1.41 MG/DL (ref 0.55–1.02)
EOSINOPHIL # BLD AUTO: 0.22 X10(3)/MCL (ref 0–0.9)
EOSINOPHIL NFR BLD AUTO: 3.6 %
ERYTHROCYTE [DISTWIDTH] IN BLOOD BY AUTOMATED COUNT: 13 % (ref 11.5–17)
GFR SERPLBLD CREATININE-BSD FMLA CKD-EPI: 39 MLS/MIN/1.73/M2
GLOBULIN SER-MCNC: 3.7 GM/DL (ref 2.4–3.5)
GLUCOSE SERPL-MCNC: 124 MG/DL (ref 82–115)
HCT VFR BLD AUTO: 34 % (ref 37–47)
HGB BLD-MCNC: 10.4 G/DL (ref 12–16)
IMM GRANULOCYTES # BLD AUTO: 0.01 X10(3)/MCL (ref 0–0.04)
IMM GRANULOCYTES NFR BLD AUTO: 0.2 %
IRON SATN MFR SERPL: 21 % (ref 20–50)
IRON SERPL-MCNC: 58 UG/DL (ref 50–170)
LYMPHOCYTES # BLD AUTO: 2.27 X10(3)/MCL (ref 0.6–4.6)
LYMPHOCYTES NFR BLD AUTO: 37.2 %
MCH RBC QN AUTO: 28.7 PG (ref 27–31)
MCHC RBC AUTO-ENTMCNC: 30.6 G/DL (ref 33–36)
MCV RBC AUTO: 93.7 FL (ref 80–94)
MONOCYTES # BLD AUTO: 0.64 X10(3)/MCL (ref 0.1–1.3)
MONOCYTES NFR BLD AUTO: 10.5 %
NEUTROPHILS # BLD AUTO: 2.91 X10(3)/MCL (ref 2.1–9.2)
NEUTROPHILS NFR BLD AUTO: 47.7 %
PLATELET # BLD AUTO: 251 X10(3)/MCL (ref 130–400)
PMV BLD AUTO: 8.9 FL (ref 7.4–10.4)
POTASSIUM SERPL-SCNC: 5.1 MMOL/L (ref 3.5–5.1)
PROT SERPL-MCNC: 7.5 GM/DL (ref 5.8–7.6)
RBC # BLD AUTO: 3.63 X10(6)/MCL (ref 4.2–5.4)
SODIUM SERPL-SCNC: 142 MMOL/L (ref 136–145)
TIBC SERPL-MCNC: 222 UG/DL (ref 70–310)
TIBC SERPL-MCNC: 280 UG/DL (ref 250–450)
TRANSFERRIN SERPL-MCNC: 239 MG/DL (ref 173–360)
WBC # SPEC AUTO: 6.1 X10(3)/MCL (ref 4.5–11.5)

## 2023-10-18 PROCEDURE — A4216 STERILE WATER/SALINE, 10 ML: HCPCS | Performed by: NURSE PRACTITIONER

## 2023-10-18 PROCEDURE — 85025 COMPLETE CBC W/AUTO DIFF WBC: CPT

## 2023-10-18 PROCEDURE — 80053 COMPREHEN METABOLIC PANEL: CPT

## 2023-10-18 PROCEDURE — 96523 IRRIG DRUG DELIVERY DEVICE: CPT

## 2023-10-18 PROCEDURE — 63600175 PHARM REV CODE 636 W HCPCS: Performed by: NURSE PRACTITIONER

## 2023-10-18 PROCEDURE — 25000003 PHARM REV CODE 250: Performed by: NURSE PRACTITIONER

## 2023-10-18 PROCEDURE — 83550 IRON BINDING TEST: CPT

## 2023-10-18 PROCEDURE — 36415 COLL VENOUS BLD VENIPUNCTURE: CPT

## 2023-10-18 PROCEDURE — 82378 CARCINOEMBRYONIC ANTIGEN: CPT

## 2023-10-18 RX ORDER — SODIUM CHLORIDE 0.9 % (FLUSH) 0.9 %
10 SYRINGE (ML) INJECTION
Status: CANCELLED | OUTPATIENT
Start: 2024-01-18

## 2023-10-18 RX ORDER — HEPARIN 100 UNIT/ML
500 SYRINGE INTRAVENOUS
Status: DISCONTINUED | OUTPATIENT
Start: 2023-10-18 | End: 2023-10-18 | Stop reason: HOSPADM

## 2023-10-18 RX ORDER — SODIUM CHLORIDE 0.9 % (FLUSH) 0.9 %
10 SYRINGE (ML) INJECTION
Status: DISCONTINUED | OUTPATIENT
Start: 2023-10-18 | End: 2023-10-18 | Stop reason: HOSPADM

## 2023-10-18 RX ORDER — HEPARIN 100 UNIT/ML
500 SYRINGE INTRAVENOUS
Status: CANCELLED | OUTPATIENT
Start: 2024-01-18

## 2023-10-18 RX ADMIN — Medication 10 ML: at 10:10

## 2023-10-18 RX ADMIN — HEPARIN 500 UNITS: 100 SYRINGE at 10:10

## 2023-10-18 NOTE — NURSING
Mediport flushed using sterile technique, no complications. Pt discharged home in stable condition, future appts given.

## 2023-10-24 ENCOUNTER — OFFICE VISIT (OUTPATIENT)
Dept: HEMATOLOGY/ONCOLOGY | Facility: CLINIC | Age: 73
End: 2023-10-24
Payer: MEDICARE

## 2023-10-24 VITALS
HEIGHT: 65 IN | OXYGEN SATURATION: 99 % | WEIGHT: 226 LBS | SYSTOLIC BLOOD PRESSURE: 127 MMHG | DIASTOLIC BLOOD PRESSURE: 84 MMHG | HEART RATE: 62 BPM | RESPIRATION RATE: 18 BRPM | TEMPERATURE: 98 F | BODY MASS INDEX: 37.65 KG/M2

## 2023-10-24 DIAGNOSIS — C18.2 MALIGNANT NEOPLASM OF ASCENDING COLON: Primary | ICD-10-CM

## 2023-10-24 PROCEDURE — 99214 OFFICE O/P EST MOD 30 MIN: CPT | Mod: PBBFAC | Performed by: NURSE PRACTITIONER

## 2023-10-24 PROCEDURE — 99214 PR OFFICE/OUTPT VISIT, EST, LEVL IV, 30-39 MIN: ICD-10-PCS | Mod: S$PBB,,, | Performed by: NURSE PRACTITIONER

## 2023-10-24 PROCEDURE — 99999 PR PBB SHADOW E&M-EST. PATIENT-LVL IV: ICD-10-PCS | Mod: PBBFAC,,, | Performed by: NURSE PRACTITIONER

## 2023-10-24 PROCEDURE — 99999 PR PBB SHADOW E&M-EST. PATIENT-LVL IV: CPT | Mod: PBBFAC,,, | Performed by: NURSE PRACTITIONER

## 2023-10-24 PROCEDURE — 99214 OFFICE O/P EST MOD 30 MIN: CPT | Mod: S$PBB,,, | Performed by: NURSE PRACTITIONER

## 2023-10-24 RX ORDER — OLMESARTAN MEDOXOMIL AND HYDROCHLOROTHIAZIDE 20/12.5 20; 12.5 MG/1; MG/1
1 TABLET ORAL DAILY
COMMUNITY

## 2023-10-24 RX ORDER — SEMAGLUTIDE 1.34 MG/ML
0.5 INJECTION, SOLUTION SUBCUTANEOUS
COMMUNITY

## 2023-10-24 NOTE — PROGRESS NOTES
HEMATOLOGY/ONCOLOGY OFFICE CLINIC VISIT    Visit Information:    Initial Consultation:8/8/18  Referring Physician:Dr Caraballo  Other Physicians:  Code Status:Not addressed    Diagnosis/Problem list:   T2N1Mx Stage III colon cancer --Dx 6/18  [id number] - Stage I Right Breast Cancer (Dx 2007) s/p 5 years of Femara    Present Treatment:  Surveillance    Treatment history:    Right breast lumpectomy and axillary lymph node dissection (12/5/2007)  Femara x 5 years  Xeloda + Oxaloplatin X 4 CYCLES (12 WEEKS) (8/28/18-10/30/18)     Plan of care: Surveillance    Imaging:  PET/CT 8/15/18: Previous right hemicolectomy with enterocolonic anastomosis about the hepatic fracture without local hypermetabolic soft tissue proliferation. Additional suture lines involve the left aspect of the transverse colon close to the the midline which is surrounded by inflammatory groundglass complex mostly along the posterior aspect which covers an approximate area of 3.2 x 2.4 cm. This inflammatory complex is without appearance of a typical mass lesion though is hypermetabolic with SUV of 5.43. Remaining colon is remarkable for noninflamed diverticulosis coli. There is no free fluid. There are no mesenteric or retroperitoneal periaortic hypermetabolic enlarged lymph nodes.  No metastatic lymphadenopathy or solid organs involvement.   CT A/P 11/20/18:liver is normal in size and contour, tiny focal hypodensity  right hepatic lobe, unchanged, measuring 3 mm. No suggestion of recurrent disease. Stable NAVDEEP 4.5 mm nodule.  CTC/A/P 12/2/19: 4 mm pulmonary nodule in the NAVDEPE. liver is diffusely hypodense suggestive of hepatic steatosis. right hemicolectomy with intact anastomotic sutures. No evidence for metastatic disease.  CT C/A/P 6/14/21:No new suspicious findings chest, abdomen or pelvis. Diverticulitis with mild sigmoid inflammation, correlate clinically for diverticulitis.  CT C/A/P 12/7/2021: Stable 4 mmLUL nodule 2 millimetric left hepatic  hypodensities stable. No new or worsening malignant findings identified  CT C/A/P 7/13/22: No evidence of residual or recurrent disease seen, Chronic scarring in the lingula and right lower lobe, Stable appearing left renal cyst and area of cortical calcification right kidney, Hepatic steatosis  CT C/A/P 1/5/2023: 1.  No metastatic findings identified.   2.  Stable chronic changes with details above.     MMG 1/24/2013: Birads cat 2: benign.  MMG 1/23/2015: Birads cat 2: benign.  MMG 1/25/2016: Birads cat 2: benign.  MMG 6/7/2021:  Birads cat 2: benign. She does mammogram [facility]       Pathology:  12/7/2007, Right breast, lumpectomy:  1. Ductal carcinoma in situ, high nuclear grade, with comedo necrosis and foci of microinvasion.  2. Tumor extends into lobules.  3. Surgical margins of excision free of tumor.  4. Non-neoplastic breast parenchyma demonstrates fibrocystic changes without atypia.  Right axillary node dissection:19 of 19 lymph node negative for metastatic adeno carcinoma  ER > 50%, [place] > 50%, Her 2 Adeola 0, neg    5/30/2018:   Right colon mass biopsy:  Adeno carcinoma, moderately differentiated  Sigmoid colon polyp: Hyperplastic polyp early formation    6/20/2018: right colon hemicolectomy, including segment of terminal ileum:  Invasive colonic adenocarcinoma, grade 2.  Tumor measures 3.17 m in maximum dimension.  Tumor focally extends into, but not beyond the muscularis propria.  Surgical margins free of tumor.  Appendix free of tumor and transmural cute inflammation.  Diverticulosis.  2/17 pericolonic lymph nodes positive for metastatic colonic adenocarcinoma.        CLINICAL HISTORY:       Patient: Ms. Mae is a 71 y/o female with a hsitory of DCIS that went for a regular followup with Dr Shane and routine blood counts showed anemia. She was referred to GI and first colonoscopy on 5/29/18 showed mass in her  proximal ascending colon on the opposite side of the lumen from the ileocecal valve. Pathology  "revealed adenocarcinoma, moderately differentiated.     On 6/20/2018, she underwent right colon, hemicolectomy, including segment of terminal ileum. Pathology revealed invasive colonic adenocarcinoma, grade 2 measuring 3.1 cm in maximum dimension extending into but not beyond the muscularis papaya with surgical margins free of tumor. 2 of 17 pericolonic lymph nodes were positive for metastatic colonic adenocarcinoma. Dr. Jarrod Caraballo performed her surgery. CEA prior to surgery was 178. Preop CT abdomen and pelvis showed right lower quadrant mesenteric mass and no other signs of metastatic disease.     According to [facility] documentation from 3/15/2018, patient is up-to-date with her annual mammograms although we will have to request this documentation.     Chief Complaint: no complaints today      Interval History:  ** She completed adjuvant XELOX on 10/30/2018. ** Last colonoscopy in 9/28/2021 by Dr Garcia- diverticulitis.     Patient presents to clinic today for follow up in surveillance for colon and breast cancer . She is doing well. Denies fever, chills, sweats. No chest pain or shortness of breath. No  blood in urine or stools. No changes in bowel pattern.     ROS:  All 14 points ROS taken and positive as per Interval History, all other negative.    Histories:  PMH/PSH/FH/SOCIAL/ALLERGIES AND MEDS REVIEWED AND UPDATED AS APPROPRIATE       Vitals:    10/24/23 1301   BP: 127/84   BP Location: Left arm   Patient Position: Sitting   BP Method: Large (Automatic)   Pulse: 62   Resp: 18   Temp: 98 °F (36.7 °C)   TempSrc: Oral   SpO2: 99%   Weight: 102.5 kg (226 lb)   Height: 5' 5" (1.651 m)        Physical Exam  Vitals and nursing note reviewed.   Constitutional:       General: She is not in acute distress.     Appearance: Normal appearance. She is well-developed.   HENT:      Head: Normocephalic and atraumatic.      Mouth/Throat:      Mouth: Mucous membranes are moist.   Eyes:      General: No scleral icterus.     " Extraocular Movements: Extraocular movements intact.      Conjunctiva/sclera: Conjunctivae normal.      Pupils: Pupils are equal, round, and reactive to light.   Neck:      Vascular: No JVD.   Cardiovascular:      Rate and Rhythm: Normal rate and regular rhythm.      Heart sounds: No murmur heard.  Pulmonary:      Effort: Pulmonary effort is normal.      Breath sounds: Normal breath sounds. No wheezing or rhonchi.   Chest:      Chest wall: No deformity or tenderness.   Abdominal:      General: Bowel sounds are normal. There is no distension.      Palpations: Abdomen is soft. There is no mass.      Tenderness: There is no abdominal tenderness.   Musculoskeletal:         General: No swelling or deformity.      Cervical back: Neck supple.   Lymphadenopathy:      Cervical: No cervical adenopathy.      Upper Body:      Right upper body: No supraclavicular or axillary adenopathy.      Left upper body: No supraclavicular or axillary adenopathy.      Lower Body: No right inguinal adenopathy. No left inguinal adenopathy.   Skin:     General: Skin is warm.      Coloration: Skin is not jaundiced.      Findings: No lesion or rash.      Nails: There is no clubbing.   Neurological:      General: No focal deficit present.      Mental Status: She is alert and oriented to person, place, and time.      Sensory: Sensation is intact.      Motor: Motor function is intact.      Gait: Gait is intact.   Psychiatric:         Attention and Perception: Attention normal.         Mood and Affect: Mood and affect normal.         Speech: Speech normal.         Behavior: Behavior is cooperative.         Thought Content: Thought content normal.         Cognition and Memory: Cognition normal.         Judgment: Judgment normal.       ECOG SCORE             Laboratory:  CBC with Differential:  Lab Results   Component Value Date    WBC 6.10 10/18/2023    RBC 3.63 (L) 10/18/2023    HGB 10.4 (L) 10/18/2023    HCT 34.0 (L) 10/18/2023    MCV 93.7  10/18/2023    MCH 28.7 10/18/2023    MCHC 30.6 (L) 10/18/2023    RDW 13.0 10/18/2023     10/18/2023    MPV 8.9 10/18/2023        CMP:  Sodium Level   Date Value Ref Range Status   10/18/2023 142 136 - 145 mmol/L Final     Potassium Level   Date Value Ref Range Status   10/18/2023 5.1 3.5 - 5.1 mmol/L Final     Carbon Dioxide   Date Value Ref Range Status   10/18/2023 25 23 - 31 mmol/L Final     Blood Urea Nitrogen   Date Value Ref Range Status   10/18/2023 34.7 (H) 9.8 - 20.1 mg/dL Final     Creatinine   Date Value Ref Range Status   10/18/2023 1.41 (H) 0.55 - 1.02 mg/dL Final     Calcium Level Total   Date Value Ref Range Status   10/18/2023 9.8 8.4 - 10.2 mg/dL Final     Albumin Level   Date Value Ref Range Status   10/18/2023 3.8 3.4 - 4.8 g/dL Final     Bilirubin Total   Date Value Ref Range Status   10/18/2023 0.3 <=1.5 mg/dL Final     Alkaline Phosphatase   Date Value Ref Range Status   10/18/2023 76 40 - 150 unit/L Final     Aspartate Aminotransferase   Date Value Ref Range Status   10/18/2023 14 5 - 34 unit/L Final     Alanine Aminotransferase   Date Value Ref Range Status   10/18/2023 10 0 - 55 unit/L Final     Estimated GFR-Non    Date Value Ref Range Status   11/08/2021 52 mL/min/1.73 m2 Final         Assessment:       1) Stage III colon cancer status post 3 months of adjuvant chemotherapy with XELOX.-- Dx 6/20/2018   Colonoscopy in 7/2019-- s/p polypectomy x 2. Repeated 8/2021- diverticulitis.  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 No clinical evidence of disease. CEA normal. Will continue surveillance.  CT C/A/P due in 4/2024- ordered.   Mammograms ordered by PCP  RTC in 6 months with labs: CBC, CMP, CEA, Iron  studies      Encouraged to call for any questions or problems  The patient was given ample opportunity to ask questions and they were all answered to satisfaction; patient demonstrated understanding of what we discussed and is agreeable to the plan.           NAVNEET CortezP

## 2023-11-24 ENCOUNTER — OFFICE VISIT (OUTPATIENT)
Dept: URGENT CARE | Facility: CLINIC | Age: 73
End: 2023-11-24
Payer: MEDICARE

## 2023-11-24 VITALS
HEIGHT: 65 IN | HEART RATE: 71 BPM | WEIGHT: 226 LBS | TEMPERATURE: 98 F | OXYGEN SATURATION: 97 % | BODY MASS INDEX: 37.65 KG/M2 | RESPIRATION RATE: 18 BRPM | SYSTOLIC BLOOD PRESSURE: 160 MMHG | DIASTOLIC BLOOD PRESSURE: 88 MMHG

## 2023-11-24 DIAGNOSIS — R60.0 PEDAL EDEMA: Primary | ICD-10-CM

## 2023-11-24 PROCEDURE — 99213 OFFICE O/P EST LOW 20 MIN: CPT | Mod: ,,, | Performed by: FAMILY MEDICINE

## 2023-11-24 PROCEDURE — 99213 PR OFFICE/OUTPT VISIT, EST, LEVL III, 20-29 MIN: ICD-10-PCS | Mod: ,,, | Performed by: FAMILY MEDICINE

## 2023-11-24 RX ORDER — OLMESARTAN MEDOXOMIL 40 MG/1
40 TABLET ORAL
COMMUNITY
Start: 2023-11-02

## 2023-11-24 RX ORDER — HYDROCHLOROTHIAZIDE 12.5 MG/1
12.5 TABLET ORAL DAILY
Qty: 5 TABLET | Refills: 0 | Status: SHIPPED | OUTPATIENT
Start: 2023-11-24 | End: 2023-11-29

## 2023-11-24 NOTE — PATIENT INSTRUCTIONS
Please take hydrochlorothiazide 12.5 mg once daily for up to 5 days for your leg swelling    Please follow-up with your primary doctor on Monday for further management of this issue    Go to the emergency room if you develop chest pain, shortness and breath, or if your lightheadedness returns

## 2023-11-24 NOTE — PROGRESS NOTES
Patient ID: [id number]     Chief Complaint:  Edema, dizziness,    History of Present Illness:     Osiris Mae is a 73 y.o. female  with a medical history including obesity, type 2 diabetes, hypertension, hyperlipidemia, breast cancer, colon cancer, on Plavix, on Pletal, who presents today for symptoms of Joint Swelling (Bilateral ankle swelling, discomfort x last night. No injury.) and Hypertension (Elevated blood pressure, dizziness x last night. Reading of 169/96 this morning. BP medication (Olmesartan) taken at 8am this morning. Unable to call PCP until Monday. Patient states she was recently taken off diuretic last week. )  She was taken off her combination of telmisartan-hydrochlorothiazide a little over 2 weeks ago because she no longer had swelling in her legs.  Beginning yesterday and this morning she started realizing that her bilateral ankles were swollen.  She believes this may be because she was on her feet for long time yesterday during .  She is not having chest pain or shortness of breath.  The dizziness is not dizziness but is in fact mild intermittent lightheadedness which does not her affect her cognition.    Pt denies experiencing any fevers, chills, nausea, vomiting, difficulty breathing, dysphagia, or neck stiffness.    Past Medical History:     ----------------------------  Breast cancer  Colon cancer  Diabetes mellitus, type 2  HTN (hypertension)  Hyperlipidemia     Past Surgical History:   Procedure Laterality Date    BREAST LUMPECTOMY Right 2007     SECTION      x2    COLECTOMY      right axillary lymph node dissection      right knee scope         Review of patient's allergies indicates:  No Known Allergies    Outpatient Medications Marked as Taking for the 23 encounter (Office Visit) with Narendra Dior MD   Medication Sig Dispense Refill    atorvastatin (LIPITOR) 80 MG tablet TAKE 1 TABLET BY MOUTH ONCE DAILY AT NIGHT AT BEDTIME FOR 30 DAYS       cilostazoL (PLETAL) 100 MG Tab Take 100 mg by mouth once daily.      clopidogreL (PLAVIX) 75 mg tablet Take 75 mg by mouth once daily.      HYDROcodone-acetaminophen (NORCO)  mg per tablet Take 1 tablet by mouth 3 (three) times daily.      iron-vitamin C 100-250 mg, ICAR-C, 100-250 mg Tab Take 1 tablet by mouth once daily.      meloxicam (MOBIC) 7.5 MG tablet Take 7.5 mg by mouth once daily.      metFORMIN (GLUCOPHAGE) 500 MG tablet Take 500 mg by mouth 2 (two) times daily.      nebivoloL (BYSTOLIC) 10 MG Tab Take 10 mg by mouth once daily.      olmesartan (BENICAR) 40 MG tablet Take 40 mg by mouth.      semaglutide (OZEMPIC) 0.25 mg or 0.5 mg(2 mg/1.5 mL) pen injector Inject 0.5 mg into the skin every 7 days.         Social History     Socioeconomic History    Marital status:    Tobacco Use    Smoking status: Never    Smokeless tobacco: Never   Substance and Sexual Activity    Alcohol use: Never    Drug use: Never        Family History   Problem Relation Age of Onset    Breast cancer Mother     Stroke Father     Breast cancer Sister     Lung cancer Brother         Subjective:     Review of Systems   Constitutional:  Negative for chills, fever and malaise/fatigue.   HENT:  Negative for congestion, ear discharge, ear pain, sinus pain and sore throat.    Respiratory:  Negative for cough, sputum production, shortness of breath, wheezing and stridor.    Gastrointestinal:  Negative for abdominal pain, diarrhea, nausea and vomiting.   Genitourinary:  Negative for dysuria, frequency and urgency.   Musculoskeletal:  Negative for neck pain.        Bilateral leg swelling   Skin:  Negative for rash.   Neurological:  Positive for dizziness. Negative for headaches.       Objective:     Vitals:    11/24/23 1243   BP: (!) 160/88   Pulse:    Resp:    Temp:      Body mass index is 37.61 kg/m².    Physical Exam  Constitutional:       General: She is not in acute distress.     Appearance: Normal appearance. She is not  ill-appearing or toxic-appearing.   HENT:      Head: Normocephalic and atraumatic.      Right Ear: Tympanic membrane and ear canal normal.      Left Ear: Tympanic membrane and ear canal normal.      Nose: No congestion or rhinorrhea.      Mouth/Throat:      Pharynx: Oropharynx is clear. No oropharyngeal exudate or posterior oropharyngeal erythema.   Eyes:      General:         Right eye: No discharge.         Left eye: No discharge.      Extraocular Movements: Extraocular movements intact.      Conjunctiva/sclera: Conjunctivae normal.   Cardiovascular:      Rate and Rhythm: Normal rate and regular rhythm.      Heart sounds: Normal heart sounds. No murmur heard.     No gallop.   Pulmonary:      Effort: Pulmonary effort is normal. No respiratory distress.      Breath sounds: Normal breath sounds. No stridor. No wheezing, rhonchi or rales.   Chest:      Chest wall: No tenderness.   Musculoskeletal:      Cervical back: No rigidity or tenderness.      Right lower leg: Edema present.      Left lower leg: Edema present.      Comments: Plus one bilateral pitting edema   Lymphadenopathy:      Cervical: No cervical adenopathy.   Skin:     Capillary Refill: Capillary refill takes 2 to 3 seconds.   Neurological:      General: No focal deficit present.      Mental Status: She is alert and oriented to person, place, and time. Mental status is at baseline.   Psychiatric:         Mood and Affect: Mood normal.         Behavior: Behavior normal.         Assessment & Plan:       ICD-10-CM ICD-9-CM   1. Pedal edema  R60.0 782.3        1. Pedal edema    Other orders  -     hydroCHLOROthiazide (HYDRODIURIL) 12.5 MG Tab; Take 1 tablet (12.5 mg total) by mouth once daily. for 5 days  Dispense: 5 tablet; Refill: 0         Normal exam.  Lungs clear, no cognitive deficits, no red flag signs.  Likely increased edema from combination of being off of the HCTZ and standing around a lot yesterday during Thanksgiving.  We will give her 5 days of  HCTZ 12.5, which is the same dose she was on 2 weeks ago, and she will call her doctor for further management on Monday.  This is just to get her through the weekend.  ER precautions given

## 2024-01-10 ENCOUNTER — INFUSION (OUTPATIENT)
Dept: INFUSION THERAPY | Facility: HOSPITAL | Age: 74
End: 2024-01-10
Attending: INTERNAL MEDICINE
Payer: MEDICARE

## 2024-01-10 VITALS
DIASTOLIC BLOOD PRESSURE: 76 MMHG | SYSTOLIC BLOOD PRESSURE: 142 MMHG | OXYGEN SATURATION: 96 % | TEMPERATURE: 98 F | HEART RATE: 65 BPM | RESPIRATION RATE: 18 BRPM

## 2024-01-10 DIAGNOSIS — D64.9 ANEMIA, UNSPECIFIED TYPE: Primary | ICD-10-CM

## 2024-01-10 PROCEDURE — 63600175 PHARM REV CODE 636 W HCPCS: Performed by: NURSE PRACTITIONER

## 2024-01-10 PROCEDURE — 96523 IRRIG DRUG DELIVERY DEVICE: CPT

## 2024-01-10 PROCEDURE — 25000003 PHARM REV CODE 250: Performed by: NURSE PRACTITIONER

## 2024-01-10 PROCEDURE — A4216 STERILE WATER/SALINE, 10 ML: HCPCS | Performed by: NURSE PRACTITIONER

## 2024-01-10 RX ORDER — SODIUM CHLORIDE 0.9 % (FLUSH) 0.9 %
10 SYRINGE (ML) INJECTION
Status: DISCONTINUED | OUTPATIENT
Start: 2024-01-10 | End: 2024-01-10 | Stop reason: HOSPADM

## 2024-01-10 RX ORDER — HEPARIN 100 UNIT/ML
500 SYRINGE INTRAVENOUS
OUTPATIENT
Start: 2024-01-18

## 2024-01-10 RX ORDER — SODIUM CHLORIDE 0.9 % (FLUSH) 0.9 %
10 SYRINGE (ML) INJECTION
OUTPATIENT
Start: 2024-01-18

## 2024-01-10 RX ORDER — HEPARIN 100 UNIT/ML
500 SYRINGE INTRAVENOUS
Status: DISCONTINUED | OUTPATIENT
Start: 2024-01-10 | End: 2024-01-10 | Stop reason: HOSPADM

## 2024-01-10 RX ADMIN — Medication 10 ML: at 11:01

## 2024-01-10 RX ADMIN — HEPARIN 500 UNITS: 100 SYRINGE at 11:01

## 2024-04-24 ENCOUNTER — INFUSION (OUTPATIENT)
Dept: INFUSION THERAPY | Facility: HOSPITAL | Age: 74
End: 2024-04-24
Attending: INTERNAL MEDICINE
Payer: MEDICARE

## 2024-04-24 ENCOUNTER — HOSPITAL ENCOUNTER (OUTPATIENT)
Dept: RADIOLOGY | Facility: HOSPITAL | Age: 74
Discharge: HOME | End: 2024-04-24
Attending: NURSE PRACTITIONER
Payer: MEDICARE

## 2024-04-24 VITALS
BODY MASS INDEX: 37.65 KG/M2 | WEIGHT: 226 LBS | TEMPERATURE: 98 F | RESPIRATION RATE: 18 BRPM | SYSTOLIC BLOOD PRESSURE: 137 MMHG | DIASTOLIC BLOOD PRESSURE: 73 MMHG | HEIGHT: 65 IN | HEART RATE: 71 BPM

## 2024-04-24 DIAGNOSIS — C18.2 MALIGNANT NEOPLASM OF ASCENDING COLON: ICD-10-CM

## 2024-04-24 DIAGNOSIS — D64.9 ANEMIA, UNSPECIFIED TYPE: Primary | ICD-10-CM

## 2024-04-24 LAB
CREAT SERPL-MCNC: 1.4 MG/DL (ref 0.5–1.4)
SAMPLE: NORMAL

## 2024-04-24 PROCEDURE — 25500020 PHARM REV CODE 255: Performed by: NURSE PRACTITIONER

## 2024-04-24 PROCEDURE — 74177 CT ABD & PELVIS W/CONTRAST: CPT | Mod: TC

## 2024-04-24 PROCEDURE — 63600175 PHARM REV CODE 636 W HCPCS: Performed by: NURSE PRACTITIONER

## 2024-04-24 PROCEDURE — 96523 IRRIG DRUG DELIVERY DEVICE: CPT

## 2024-04-24 RX ORDER — HEPARIN 100 UNIT/ML
500 SYRINGE INTRAVENOUS
Status: DISCONTINUED | OUTPATIENT
Start: 2024-04-24 | End: 2024-04-24 | Stop reason: HOSPADM

## 2024-04-24 RX ORDER — SODIUM CHLORIDE 0.9 % (FLUSH) 0.9 %
10 SYRINGE (ML) INJECTION
OUTPATIENT
Start: 2024-06-18

## 2024-04-24 RX ORDER — HEPARIN 100 UNIT/ML
500 SYRINGE INTRAVENOUS
OUTPATIENT
Start: 2024-06-18

## 2024-04-24 RX ORDER — SODIUM CHLORIDE 0.9 % (FLUSH) 0.9 %
10 SYRINGE (ML) INJECTION
Status: DISCONTINUED | OUTPATIENT
Start: 2024-04-24 | End: 2024-04-24 | Stop reason: HOSPADM

## 2024-04-24 RX ADMIN — DIATRIZOATE MEGLUMINE AND DIATRIZOATE SODIUM 30 ML: 600; 100 SOLUTION ORAL; RECTAL at 10:04

## 2024-04-24 RX ADMIN — IOPAMIDOL 100 ML: 755 INJECTION, SOLUTION INTRAVENOUS at 11:04

## 2024-04-24 RX ADMIN — HEPARIN 500 UNITS: 100 SYRINGE at 12:04

## 2024-04-29 ENCOUNTER — OFFICE VISIT (OUTPATIENT)
Dept: HEMATOLOGY/ONCOLOGY | Facility: CLINIC | Age: 74
End: 2024-04-29
Payer: MEDICARE

## 2024-04-29 VITALS
OXYGEN SATURATION: 96 % | TEMPERATURE: 98 F | SYSTOLIC BLOOD PRESSURE: 111 MMHG | WEIGHT: 233.5 LBS | HEIGHT: 65 IN | DIASTOLIC BLOOD PRESSURE: 72 MMHG | RESPIRATION RATE: 18 BRPM | HEART RATE: 65 BPM | BODY MASS INDEX: 38.9 KG/M2

## 2024-04-29 DIAGNOSIS — C18.2 MALIGNANT NEOPLASM OF ASCENDING COLON: Primary | ICD-10-CM

## 2024-04-29 PROCEDURE — 1159F MED LIST DOCD IN RCRD: CPT | Mod: CPTII,S$GLB,, | Performed by: NURSE PRACTITIONER

## 2024-04-29 PROCEDURE — 3074F SYST BP LT 130 MM HG: CPT | Mod: CPTII,S$GLB,, | Performed by: NURSE PRACTITIONER

## 2024-04-29 PROCEDURE — 3078F DIAST BP <80 MM HG: CPT | Mod: CPTII,S$GLB,, | Performed by: NURSE PRACTITIONER

## 2024-04-29 PROCEDURE — 3288F FALL RISK ASSESSMENT DOCD: CPT | Mod: CPTII,S$GLB,, | Performed by: NURSE PRACTITIONER

## 2024-04-29 PROCEDURE — 99214 OFFICE O/P EST MOD 30 MIN: CPT | Mod: S$GLB,,, | Performed by: NURSE PRACTITIONER

## 2024-04-29 PROCEDURE — 1126F AMNT PAIN NOTED NONE PRSNT: CPT | Mod: CPTII,S$GLB,, | Performed by: NURSE PRACTITIONER

## 2024-04-29 PROCEDURE — 1101F PT FALLS ASSESS-DOCD LE1/YR: CPT | Mod: CPTII,S$GLB,, | Performed by: NURSE PRACTITIONER

## 2024-04-29 PROCEDURE — 99999 PR PBB SHADOW E&M-EST. PATIENT-LVL IV: CPT | Mod: PBBFAC,,, | Performed by: NURSE PRACTITIONER

## 2024-04-29 RX ORDER — SODIUM CITRATE AND CITRIC ACID MONOHYDRATE 334; 500 MG/5ML; MG/5ML
10 SOLUTION ORAL 2 TIMES DAILY
COMMUNITY
Start: 2023-11-11

## 2024-04-29 RX ORDER — DAPAGLIFLOZIN 5 MG/1
5 TABLET, FILM COATED ORAL
COMMUNITY

## 2024-04-29 RX ORDER — FLUTICASONE PROPIONATE 50 MCG
2 SPRAY, SUSPENSION (ML) NASAL
COMMUNITY
Start: 2023-11-02

## 2024-04-29 RX ORDER — SODIUM BICARBONATE 650 MG/1
650 TABLET ORAL 2 TIMES DAILY
COMMUNITY
Start: 2023-11-11

## 2024-04-29 NOTE — PROGRESS NOTES
HEMATOLOGY/ONCOLOGY OFFICE CLINIC VISIT    Visit Information:    Initial Consultation:8/8/18  Referring Physician:Dr Caraballo  Other Physicians:  Code Status:Not addressed    Diagnosis/Problem list:   T2N1Mx Stage III colon cancer --Dx 6/18  [id number] - Stage I Right Breast Cancer (Dx 2007) s/p 5 years of Femara    Present Treatment:  Surveillance    Treatment history:    Right breast lumpectomy and axillary lymph node dissection (12/5/2007)  Femara x 5 years  Xeloda + Oxaloplatin X 4 CYCLES (12 WEEKS) (8/28/18-10/30/18)     Plan of care: Surveillance    Imaging:  PET/CT 8/15/18: Previous right hemicolectomy with enterocolonic anastomosis about the hepatic fracture without local hypermetabolic soft tissue proliferation. Additional suture lines involve the left aspect of the transverse colon close to the the midline which is surrounded by inflammatory groundglass complex mostly along the posterior aspect which covers an approximate area of 3.2 x 2.4 cm. This inflammatory complex is without appearance of a typical mass lesion though is hypermetabolic with SUV of 5.43. Remaining colon is remarkable for noninflamed diverticulosis coli. There is no free fluid. There are no mesenteric or retroperitoneal periaortic hypermetabolic enlarged lymph nodes.  No metastatic lymphadenopathy or solid organs involvement.   CT A/P 11/20/18:liver is normal in size and contour, tiny focal hypodensity  right hepatic lobe, unchanged, measuring 3 mm. No suggestion of recurrent disease. Stable NAVDEEP 4.5 mm nodule.  CTC/A/P 12/2/19: 4 mm pulmonary nodule in the NAVDEEP. liver is diffusely hypodense suggestive of hepatic steatosis. right hemicolectomy with intact anastomotic sutures. No evidence for metastatic disease.  CT C/A/P 6/14/21:No new suspicious findings chest, abdomen or pelvis. Diverticulitis with mild sigmoid inflammation, correlate clinically for diverticulitis.  CT C/A/P 12/7/2021: Stable 4 mmLUL nodule 2 millimetric left hepatic  hypodensities stable. No new or worsening malignant findings identified  CT C/A/P 7/13/22: No evidence of residual or recurrent disease seen, Chronic scarring in the lingula and right lower lobe, Stable appearing left renal cyst and area of cortical calcification right kidney, Hepatic steatosis  CT C/A/P 1/5/2023: 1.  No metastatic findings identified.   2.  Stable chronic changes with details above.  CT C/A/P 4/24/2024:   No acute process or evidence of recurrent malignancy or metastatic disease to the chest abdomen or pelvis.  Surgical changes as above.      MMG 1/24/2013: Birads cat 2: benign.  MMG 1/23/2015: Birads cat 2: benign.  MMG 1/25/2016: Birads cat 2: benign.  MMG 6/7/2021:  Birads cat 2: benign. She does mammogram [facility]       Pathology:  12/7/2007, Right breast, lumpectomy:  1. Ductal carcinoma in situ, high nuclear grade, with comedo necrosis and foci of microinvasion.  2. Tumor extends into lobules.  3. Surgical margins of excision free of tumor.  4. Non-neoplastic breast parenchyma demonstrates fibrocystic changes without atypia.  Right axillary node dissection:19 of 19 lymph node negative for metastatic adeno carcinoma  ER > 50%, OK > 50%, Her 2 Adeola 0, neg    5/30/2018:   Right colon mass biopsy:  Adeno carcinoma, moderately differentiated  Sigmoid colon polyp: Hyperplastic polyp early formation    6/20/2018: right colon hemicolectomy, including segment of terminal ileum:  Invasive colonic adenocarcinoma, grade 2.  Tumor measures 3.17 m in maximum dimension.  Tumor focally extends into, but not beyond the muscularis propria.  Surgical margins free of tumor.  Appendix free of tumor and transmural cute inflammation.  Diverticulosis.  2/17 pericolonic lymph nodes positive for metastatic colonic adenocarcinoma.        CLINICAL HISTORY:       Patient: Ms. Mae is a 73 y/o female with a hsitory of DCIS that went for a regular followup with Dr Shane and routine blood counts showed anemia. She was  "referred to GI and first colonoscopy on 5/29/18 showed mass in her  proximal ascending colon on the opposite side of the lumen from the ileocecal valve. Pathology revealed adenocarcinoma, moderately differentiated.     On 6/20/2018, she underwent right colon, hemicolectomy, including segment of terminal ileum. Pathology revealed invasive colonic adenocarcinoma, grade 2 measuring 3.1 cm in maximum dimension extending into but not beyond the muscularis papaya with surgical margins free of tumor. 2 of 17 pericolonic lymph nodes were positive for metastatic colonic adenocarcinoma. Dr. Jarrod Caraballo performed her surgery. CEA prior to surgery was 178. Preop CT abdomen and pelvis showed right lower quadrant mesenteric mass and no other signs of metastatic disease.     According to [facility] documentation from 3/15/2018, patient is up-to-date with her annual mammograms although we will have to request this documentation.     Chief Complaint: 6 Month Follow Up      Interval History:  ** She completed adjuvant XELOX on 10/30/2018. ** Last colonoscopy in 9/28/2021 by Dr Garcia- diverticulitis.     Patient presents to clinic today for follow up in surveillance for colon and breast cancer . She is doing well. Denies fever, chills, sweats. No chest pain or shortness of breath. No  blood in urine or stools. No changes in bowel pattern.     CT C/A/P on 4/24/24:    Impression:     No acute process or evidence of recurrent malignancy or metastatic disease to the chest abdomen or pelvis.  Surgical changes as above.       ROS:  All 14 points ROS taken and positive as per Interval History, all other negative.    Histories:  PMH/PSH/FH/SOCIAL/ALLERGIES AND MEDS REVIEWED AND UPDATED AS APPROPRIATE       Vitals:    04/29/24 1126   BP: 111/72   BP Location: Left forearm   Patient Position: Sitting   Pulse: 65   Resp: 18   Temp: 97.8 °F (36.6 °C)   TempSrc: Oral   SpO2: 96%   Weight: 105.9 kg (233 lb 8 oz)   Height: 5' 4.96" (1.65 m)        "   Physical Exam  Vitals and nursing note reviewed.   Constitutional:       General: She is not in acute distress.     Appearance: Normal appearance. She is well-developed.   HENT:      Head: Normocephalic and atraumatic.      Mouth/Throat:      Mouth: Mucous membranes are moist.   Eyes:      General: No scleral icterus.     Extraocular Movements: Extraocular movements intact.      Conjunctiva/sclera: Conjunctivae normal.      Pupils: Pupils are equal, round, and reactive to light.   Neck:      Vascular: No JVD.   Cardiovascular:      Rate and Rhythm: Normal rate and regular rhythm.      Heart sounds: No murmur heard.  Pulmonary:      Effort: Pulmonary effort is normal.      Breath sounds: Normal breath sounds. No wheezing or rhonchi.   Chest:      Chest wall: No deformity or tenderness.   Abdominal:      General: Bowel sounds are normal. There is no distension.      Palpations: Abdomen is soft. There is no mass.      Tenderness: There is no abdominal tenderness.   Musculoskeletal:         General: No swelling or deformity.      Cervical back: Neck supple.   Lymphadenopathy:      Cervical: No cervical adenopathy.      Upper Body:      Right upper body: No supraclavicular or axillary adenopathy.      Left upper body: No supraclavicular or axillary adenopathy.      Lower Body: No right inguinal adenopathy. No left inguinal adenopathy.   Skin:     General: Skin is warm.      Coloration: Skin is not jaundiced.      Findings: No lesion or rash.      Nails: There is no clubbing.   Neurological:      General: No focal deficit present.      Mental Status: She is alert and oriented to person, place, and time.      Sensory: Sensation is intact.      Motor: Motor function is intact.      Gait: Gait is intact.   Psychiatric:         Attention and Perception: Attention normal.         Mood and Affect: Mood and affect normal.         Speech: Speech normal.         Behavior: Behavior is cooperative.         Thought Content: Thought  content normal.         Cognition and Memory: Cognition normal.         Judgment: Judgment normal.       ECOG SCORE             Laboratory:  CBC with Differential:  Lab Results   Component Value Date    WBC 5.72 04/24/2024    RBC 3.85 (L) 04/24/2024    HGB 10.9 (L) 04/24/2024    HCT 33.2 (L) 04/24/2024    MCV 86.2 04/24/2024    MCH 28.3 04/24/2024    MCHC 32.8 (L) 04/24/2024    RDW 13.5 04/24/2024     04/24/2024    MPV 8.9 04/24/2024        CMP:  Sodium Level   Date Value Ref Range Status   04/24/2024 136 136 - 145 mmol/L Final     Potassium Level   Date Value Ref Range Status   04/24/2024 4.3 3.5 - 5.1 mmol/L Final     Carbon Dioxide   Date Value Ref Range Status   04/24/2024 21 (L) 23 - 31 mmol/L Final     Blood Urea Nitrogen   Date Value Ref Range Status   04/24/2024 32.1 (H) 9.8 - 20.1 mg/dL Final     Creatinine   Date Value Ref Range Status   04/24/2024 1.35 (H) 0.55 - 1.02 mg/dL Final     Calcium Level Total   Date Value Ref Range Status   04/24/2024 9.5 8.4 - 10.2 mg/dL Final     Albumin Level   Date Value Ref Range Status   04/24/2024 3.7 3.4 - 4.8 g/dL Final     Bilirubin Total   Date Value Ref Range Status   04/24/2024 0.4 <=1.5 mg/dL Final     Alkaline Phosphatase   Date Value Ref Range Status   04/24/2024 86 40 - 150 unit/L Final     Aspartate Aminotransferase   Date Value Ref Range Status   04/24/2024 17 5 - 34 unit/L Final     Alanine Aminotransferase   Date Value Ref Range Status   04/24/2024 12 0 - 55 unit/L Final     Estimated GFR-Non    Date Value Ref Range Status   11/08/2021 52 mL/min/1.73 m2 Final         Assessment:       1) Stage III colon cancer status post 3 months of adjuvant chemotherapy with XELOX.-- Dx 6/20/2018   Colonoscopy in 7/2019-- s/p polypectomy x 2. Repeated 8/2021- diverticulitis.  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 No clinical evidence of disease. CEA normal. Will continue surveillance.  CT C/A/P due in 4/2025- ordered.   Mammograms ordered by PCP  RTC in 12 months with labs: CBC, CMP, CEA     Encouraged to call for any questions or problems  The patient was given ample opportunity to ask questions and they were all answered to satisfaction; patient demonstrated understanding of what we discussed and is agreeable to the plan.           ROBERT Cortez

## 2024-07-30 ENCOUNTER — INFUSION (OUTPATIENT)
Dept: INFUSION THERAPY | Facility: HOSPITAL | Age: 74
End: 2024-07-30
Attending: INTERNAL MEDICINE
Payer: MEDICARE

## 2024-07-30 VITALS
RESPIRATION RATE: 20 BRPM | WEIGHT: 244.31 LBS | OXYGEN SATURATION: 97 % | DIASTOLIC BLOOD PRESSURE: 68 MMHG | SYSTOLIC BLOOD PRESSURE: 115 MMHG | HEART RATE: 55 BPM | TEMPERATURE: 98 F | BODY MASS INDEX: 40.71 KG/M2 | HEIGHT: 65 IN

## 2024-07-30 DIAGNOSIS — D64.9 ANEMIA, UNSPECIFIED TYPE: Primary | ICD-10-CM

## 2024-07-30 PROCEDURE — A4216 STERILE WATER/SALINE, 10 ML: HCPCS | Performed by: NURSE PRACTITIONER

## 2024-07-30 PROCEDURE — 25000003 PHARM REV CODE 250: Performed by: NURSE PRACTITIONER

## 2024-07-30 PROCEDURE — 96523 IRRIG DRUG DELIVERY DEVICE: CPT

## 2024-07-30 PROCEDURE — 63600175 PHARM REV CODE 636 W HCPCS: Performed by: NURSE PRACTITIONER

## 2024-07-30 RX ORDER — HEPARIN 100 UNIT/ML
500 SYRINGE INTRAVENOUS
Status: DISCONTINUED | OUTPATIENT
Start: 2024-07-30 | End: 2024-07-30 | Stop reason: HOSPADM

## 2024-07-30 RX ORDER — HEPARIN 100 UNIT/ML
500 SYRINGE INTRAVENOUS
OUTPATIENT
Start: 2024-10-18

## 2024-07-30 RX ORDER — SODIUM CHLORIDE 0.9 % (FLUSH) 0.9 %
10 SYRINGE (ML) INJECTION
OUTPATIENT
Start: 2024-10-18

## 2024-07-30 RX ORDER — SODIUM CHLORIDE 0.9 % (FLUSH) 0.9 %
10 SYRINGE (ML) INJECTION
Status: DISCONTINUED | OUTPATIENT
Start: 2024-07-30 | End: 2024-07-30 | Stop reason: HOSPADM

## 2024-07-30 RX ADMIN — HEPARIN 500 UNITS: 100 SYRINGE at 01:07

## 2024-07-30 RX ADMIN — SODIUM CHLORIDE, PRESERVATIVE FREE 10 ML: 5 INJECTION INTRAVENOUS at 01:07

## 2024-11-04 ENCOUNTER — INFUSION (OUTPATIENT)
Dept: INFUSION THERAPY | Facility: HOSPITAL | Age: 74
End: 2024-11-04
Attending: INTERNAL MEDICINE
Payer: MEDICARE

## 2024-11-04 VITALS
RESPIRATION RATE: 20 BRPM | DIASTOLIC BLOOD PRESSURE: 80 MMHG | TEMPERATURE: 98 F | HEART RATE: 65 BPM | SYSTOLIC BLOOD PRESSURE: 169 MMHG | OXYGEN SATURATION: 96 %

## 2024-11-04 DIAGNOSIS — D64.9 ANEMIA, UNSPECIFIED TYPE: Primary | ICD-10-CM

## 2024-11-04 PROCEDURE — 96523 IRRIG DRUG DELIVERY DEVICE: CPT

## 2024-11-04 PROCEDURE — 63600175 PHARM REV CODE 636 W HCPCS: Performed by: INTERNAL MEDICINE

## 2024-11-04 RX ORDER — HEPARIN 100 UNIT/ML
500 SYRINGE INTRAVENOUS
OUTPATIENT
Start: 2025-01-18

## 2024-11-04 RX ORDER — HEPARIN 100 UNIT/ML
500 SYRINGE INTRAVENOUS
Status: DISCONTINUED | OUTPATIENT
Start: 2024-11-04 | End: 2024-11-04 | Stop reason: HOSPADM

## 2024-11-04 RX ORDER — SODIUM CHLORIDE 0.9 % (FLUSH) 0.9 %
10 SYRINGE (ML) INJECTION
Status: DISCONTINUED | OUTPATIENT
Start: 2024-11-04 | End: 2024-11-04 | Stop reason: HOSPADM

## 2024-11-04 RX ORDER — SODIUM CHLORIDE 0.9 % (FLUSH) 0.9 %
10 SYRINGE (ML) INJECTION
OUTPATIENT
Start: 2025-01-18

## 2024-11-04 RX ADMIN — HEPARIN 500 UNITS: 100 SYRINGE at 01:11

## 2025-01-17 PROCEDURE — 99291 CRITICAL CARE FIRST HOUR: CPT

## 2025-01-18 ENCOUNTER — HOSPITAL ENCOUNTER (INPATIENT)
Facility: HOSPITAL | Age: 75
LOS: 1 days | Discharge: HOME | DRG: 683 | End: 2025-01-19
Attending: EMERGENCY MEDICINE | Admitting: INTERNAL MEDICINE
Payer: MEDICARE

## 2025-01-18 DIAGNOSIS — N18.9 ACUTE KIDNEY INJURY SUPERIMPOSED ON CHRONIC KIDNEY DISEASE: Primary | ICD-10-CM

## 2025-01-18 DIAGNOSIS — E87.20 METABOLIC ACIDOSIS: ICD-10-CM

## 2025-01-18 DIAGNOSIS — R19.7 DIARRHEA, UNSPECIFIED TYPE: ICD-10-CM

## 2025-01-18 DIAGNOSIS — N17.9 ACUTE KIDNEY INJURY SUPERIMPOSED ON CHRONIC KIDNEY DISEASE: Primary | ICD-10-CM

## 2025-01-18 LAB
ADV 40+41 DNA STL QL NAA+NON-PROBE: NOT DETECTED
ALBUMIN SERPL-MCNC: 3.7 G/DL (ref 3.4–4.8)
ALBUMIN SERPL-MCNC: 3.8 G/DL (ref 3.4–4.8)
ALBUMIN/GLOB SERPL: 0.8 RATIO (ref 1.1–2)
ALBUMIN/GLOB SERPL: 0.8 RATIO (ref 1.1–2)
ALP SERPL-CCNC: 84 UNIT/L (ref 40–150)
ALP SERPL-CCNC: 85 UNIT/L (ref 40–150)
ALT SERPL-CCNC: 10 UNIT/L (ref 0–55)
ALT SERPL-CCNC: 13 UNIT/L (ref 0–55)
ANION GAP SERPL CALC-SCNC: 11 MEQ/L
ANION GAP SERPL CALC-SCNC: 13 MEQ/L
AST SERPL-CCNC: 12 UNIT/L (ref 5–34)
AST SERPL-CCNC: 14 UNIT/L (ref 5–34)
ASTRO TYP 1-8 RNA STL QL NAA+NON-PROBE: NOT DETECTED
B PERT.PT PRMT NPH QL NAA+NON-PROBE: NOT DETECTED
BASOPHILS # BLD AUTO: 0.02 X10(3)/MCL
BASOPHILS # BLD AUTO: 0.02 X10(3)/MCL
BASOPHILS NFR BLD AUTO: 0.2 %
BASOPHILS NFR BLD AUTO: 0.2 %
BILIRUB SERPL-MCNC: 0.6 MG/DL
BILIRUB SERPL-MCNC: 0.6 MG/DL
BUN SERPL-MCNC: 48.5 MG/DL (ref 9.8–20.1)
BUN SERPL-MCNC: 52.7 MG/DL (ref 9.8–20.1)
C CAYETANENSIS DNA STL QL NAA+NON-PROBE: NOT DETECTED
C COLI+JEJ+UPSA DNA STL QL NAA+NON-PROBE: NOT DETECTED
C DIFF TOX A+B STL QL IA: NEGATIVE
C PNEUM DNA NPH QL NAA+NON-PROBE: NOT DETECTED
CALCIUM SERPL-MCNC: 8.7 MG/DL (ref 8.4–10.2)
CALCIUM SERPL-MCNC: 9.2 MG/DL (ref 8.4–10.2)
CHLORIDE SERPL-SCNC: 108 MMOL/L (ref 98–107)
CHLORIDE SERPL-SCNC: 113 MMOL/L (ref 98–107)
CLOSTRIDIUM DIFFICILE GDH ANTIGEN (OHS): NEGATIVE
CO2 SERPL-SCNC: 11 MMOL/L (ref 23–31)
CO2 SERPL-SCNC: 15 MMOL/L (ref 23–31)
CREAT SERPL-MCNC: 2.26 MG/DL (ref 0.55–1.02)
CREAT SERPL-MCNC: 2.27 MG/DL (ref 0.55–1.02)
CREAT/UREA NIT SERPL: 21
CREAT/UREA NIT SERPL: 23
CRYPTOSP DNA STL QL NAA+NON-PROBE: NOT DETECTED
E HISTOLYT DNA STL QL NAA+NON-PROBE: NOT DETECTED
EAEC PAA PLAS AGGR+AATA ST NAA+NON-PRB: NOT DETECTED
EC STX1+STX2 GENES STL QL NAA+NON-PROBE: NOT DETECTED
EOSINOPHIL # BLD AUTO: 0.14 X10(3)/MCL (ref 0–0.9)
EOSINOPHIL # BLD AUTO: 0.22 X10(3)/MCL (ref 0–0.9)
EOSINOPHIL NFR BLD AUTO: 1.4 %
EOSINOPHIL NFR BLD AUTO: 2.7 %
EPEC EAE GENE STL QL NAA+NON-PROBE: NOT DETECTED
ERYTHROCYTE [DISTWIDTH] IN BLOOD BY AUTOMATED COUNT: 13.4 % (ref 11.5–17)
ERYTHROCYTE [DISTWIDTH] IN BLOOD BY AUTOMATED COUNT: 13.5 % (ref 11.5–17)
ETEC LTA+ST1A+ST1B TOX ST NAA+NON-PROBE: NOT DETECTED
G LAMBLIA DNA STL QL NAA+NON-PROBE: NOT DETECTED
GFR SERPLBLD CREATININE-BSD FMLA CKD-EPI: 22 ML/MIN/1.73/M2
GFR SERPLBLD CREATININE-BSD FMLA CKD-EPI: 22 ML/MIN/1.73/M2
GLOBULIN SER-MCNC: 4.4 GM/DL (ref 2.4–3.5)
GLOBULIN SER-MCNC: 4.6 GM/DL (ref 2.4–3.5)
GLUCOSE SERPL-MCNC: 144 MG/DL (ref 82–115)
GLUCOSE SERPL-MCNC: 149 MG/DL (ref 82–115)
HADV DNA NPH QL NAA+NON-PROBE: NOT DETECTED
HCOV 229E RNA NPH QL NAA+NON-PROBE: NOT DETECTED
HCOV HKU1 RNA NPH QL NAA+NON-PROBE: NOT DETECTED
HCOV NL63 RNA NPH QL NAA+NON-PROBE: NOT DETECTED
HCOV OC43 RNA NPH QL NAA+NON-PROBE: NOT DETECTED
HCT VFR BLD AUTO: 34.8 % (ref 37–47)
HCT VFR BLD AUTO: 36.9 % (ref 37–47)
HGB BLD-MCNC: 11.2 G/DL (ref 12–16)
HGB BLD-MCNC: 11.7 G/DL (ref 12–16)
HMPV RNA NPH QL NAA+NON-PROBE: NOT DETECTED
HPIV1 RNA NPH QL NAA+NON-PROBE: NOT DETECTED
HPIV2 RNA NPH QL NAA+NON-PROBE: NOT DETECTED
HPIV3 RNA NPH QL NAA+NON-PROBE: NOT DETECTED
HPIV4 RNA NPH QL NAA+NON-PROBE: NOT DETECTED
IMM GRANULOCYTES # BLD AUTO: 0.03 X10(3)/MCL (ref 0–0.04)
IMM GRANULOCYTES # BLD AUTO: 0.04 X10(3)/MCL (ref 0–0.04)
IMM GRANULOCYTES NFR BLD AUTO: 0.4 %
IMM GRANULOCYTES NFR BLD AUTO: 0.4 %
LIPASE SERPL-CCNC: 42 U/L
LYMPHOCYTES # BLD AUTO: 1.13 X10(3)/MCL (ref 0.6–4.6)
LYMPHOCYTES # BLD AUTO: 1.18 X10(3)/MCL (ref 0.6–4.6)
LYMPHOCYTES NFR BLD AUTO: 11.5 %
LYMPHOCYTES NFR BLD AUTO: 14.4 %
M PNEUMO DNA NPH QL NAA+NON-PROBE: NOT DETECTED
MCH RBC QN AUTO: 28.7 PG (ref 27–31)
MCH RBC QN AUTO: 29 PG (ref 27–31)
MCHC RBC AUTO-ENTMCNC: 31.7 G/DL (ref 33–36)
MCHC RBC AUTO-ENTMCNC: 32.2 G/DL (ref 33–36)
MCV RBC AUTO: 90.2 FL (ref 80–94)
MCV RBC AUTO: 90.7 FL (ref 80–94)
MONOCYTES # BLD AUTO: 0.96 X10(3)/MCL (ref 0.1–1.3)
MONOCYTES # BLD AUTO: 0.96 X10(3)/MCL (ref 0.1–1.3)
MONOCYTES NFR BLD AUTO: 11.7 %
MONOCYTES NFR BLD AUTO: 9.8 %
NEUTROPHILS # BLD AUTO: 5.81 X10(3)/MCL (ref 2.1–9.2)
NEUTROPHILS # BLD AUTO: 7.52 X10(3)/MCL (ref 2.1–9.2)
NEUTROPHILS NFR BLD AUTO: 70.6 %
NEUTROPHILS NFR BLD AUTO: 76.7 %
NOROVIRUS GI+II RNA STL QL NAA+NON-PROBE: NOT DETECTED
NRBC BLD AUTO-RTO: 0 %
NRBC BLD AUTO-RTO: 0 %
P SHIGELLOIDES DNA STL QL NAA+NON-PROBE: NOT DETECTED
PLATELET # BLD AUTO: 264 X10(3)/MCL (ref 130–400)
PLATELET # BLD AUTO: 277 X10(3)/MCL (ref 130–400)
PMV BLD AUTO: 9.5 FL (ref 7.4–10.4)
PMV BLD AUTO: 9.8 FL (ref 7.4–10.4)
POCT GLUCOSE: 128 MG/DL (ref 70–110)
POCT GLUCOSE: 149 MG/DL (ref 70–110)
POTASSIUM SERPL-SCNC: 3.2 MMOL/L (ref 3.5–5.1)
POTASSIUM SERPL-SCNC: 3.6 MMOL/L (ref 3.5–5.1)
PROT SERPL-MCNC: 8.1 GM/DL (ref 5.8–7.6)
PROT SERPL-MCNC: 8.4 GM/DL (ref 5.8–7.6)
RBC # BLD AUTO: 3.86 X10(6)/MCL (ref 4.2–5.4)
RBC # BLD AUTO: 4.07 X10(6)/MCL (ref 4.2–5.4)
RSV RNA NPH QL NAA+NON-PROBE: NOT DETECTED
RV+EV RNA NPH QL NAA+NON-PROBE: NOT DETECTED
RVA RNA STL QL NAA+NON-PROBE: NOT DETECTED
S ENT+BONG DNA STL QL NAA+NON-PROBE: NOT DETECTED
SAPO I+II+IV+V RNA STL QL NAA+NON-PROBE: NOT DETECTED
SHIGELLA SP+EIEC IPAH ST NAA+NON-PROBE: NOT DETECTED
SODIUM SERPL-SCNC: 134 MMOL/L (ref 136–145)
SODIUM SERPL-SCNC: 137 MMOL/L (ref 136–145)
V CHOL+PARA+VUL DNA STL QL NAA+NON-PROBE: NOT DETECTED
V CHOLERAE DNA STL QL NAA+NON-PROBE: NOT DETECTED
WBC # BLD AUTO: 8.22 X10(3)/MCL (ref 4.5–11.5)
WBC # BLD AUTO: 9.81 X10(3)/MCL (ref 4.5–11.5)
Y ENTEROCOL DNA STL QL NAA+NON-PROBE: NOT DETECTED

## 2025-01-18 PROCEDURE — 36415 COLL VENOUS BLD VENIPUNCTURE: CPT

## 2025-01-18 PROCEDURE — 63600175 PHARM REV CODE 636 W HCPCS

## 2025-01-18 PROCEDURE — 85025 COMPLETE CBC W/AUTO DIFF WBC: CPT | Performed by: EMERGENCY MEDICINE

## 2025-01-18 PROCEDURE — 87040 BLOOD CULTURE FOR BACTERIA: CPT

## 2025-01-18 PROCEDURE — 87798 DETECT AGENT NOS DNA AMP: CPT

## 2025-01-18 PROCEDURE — 25000003 PHARM REV CODE 250: Performed by: EMERGENCY MEDICINE

## 2025-01-18 PROCEDURE — 86318 IA INFECTIOUS AGENT ANTIBODY: CPT | Performed by: EMERGENCY MEDICINE

## 2025-01-18 PROCEDURE — 83690 ASSAY OF LIPASE: CPT | Performed by: EMERGENCY MEDICINE

## 2025-01-18 PROCEDURE — 80053 COMPREHEN METABOLIC PANEL: CPT | Performed by: EMERGENCY MEDICINE

## 2025-01-18 PROCEDURE — 81001 URINALYSIS AUTO W/SCOPE: CPT

## 2025-01-18 PROCEDURE — 63600175 PHARM REV CODE 636 W HCPCS: Performed by: EMERGENCY MEDICINE

## 2025-01-18 PROCEDURE — 87427 SHIGA-LIKE TOXIN AG IA: CPT

## 2025-01-18 PROCEDURE — 25000003 PHARM REV CODE 250: Performed by: INTERNAL MEDICINE

## 2025-01-18 PROCEDURE — 80053 COMPREHEN METABOLIC PANEL: CPT | Performed by: INTERNAL MEDICINE

## 2025-01-18 PROCEDURE — 25000003 PHARM REV CODE 250

## 2025-01-18 PROCEDURE — 96360 HYDRATION IV INFUSION INIT: CPT

## 2025-01-18 PROCEDURE — 11000001 HC ACUTE MED/SURG PRIVATE ROOM

## 2025-01-18 PROCEDURE — 85025 COMPLETE CBC W/AUTO DIFF WBC: CPT | Performed by: INTERNAL MEDICINE

## 2025-01-18 PROCEDURE — 87507 IADNA-DNA/RNA PROBE TQ 12-25: CPT | Performed by: EMERGENCY MEDICINE

## 2025-01-18 RX ORDER — LIDOCAINE HYDROCHLORIDE 20 MG/ML
15 SOLUTION OROPHARYNGEAL ONCE
Status: COMPLETED | OUTPATIENT
Start: 2025-01-18 | End: 2025-01-18

## 2025-01-18 RX ORDER — MAGNESIUM SULFATE 1 G/100ML
1 INJECTION INTRAVENOUS ONCE
Status: COMPLETED | OUTPATIENT
Start: 2025-01-18 | End: 2025-01-18

## 2025-01-18 RX ORDER — CLOPIDOGREL BISULFATE 75 MG/1
75 TABLET ORAL DAILY
Status: DISCONTINUED | OUTPATIENT
Start: 2025-01-18 | End: 2025-01-18

## 2025-01-18 RX ORDER — ATORVASTATIN CALCIUM 40 MG/1
80 TABLET, FILM COATED ORAL NIGHTLY
Status: DISCONTINUED | OUTPATIENT
Start: 2025-01-18 | End: 2025-01-19 | Stop reason: HOSPADM

## 2025-01-18 RX ORDER — TALC
6 POWDER (GRAM) TOPICAL NIGHTLY PRN
Status: DISCONTINUED | OUTPATIENT
Start: 2025-01-18 | End: 2025-01-19 | Stop reason: HOSPADM

## 2025-01-18 RX ORDER — SODIUM BICARBONATE 650 MG/1
650 TABLET ORAL 2 TIMES DAILY
Status: DISCONTINUED | OUTPATIENT
Start: 2025-01-18 | End: 2025-01-19 | Stop reason: HOSPADM

## 2025-01-18 RX ORDER — ONDANSETRON HYDROCHLORIDE 2 MG/ML
4 INJECTION, SOLUTION INTRAVENOUS EVERY 8 HOURS PRN
Status: DISCONTINUED | OUTPATIENT
Start: 2025-01-18 | End: 2025-01-19 | Stop reason: HOSPADM

## 2025-01-18 RX ORDER — IBUPROFEN 200 MG
24 TABLET ORAL
Status: DISCONTINUED | OUTPATIENT
Start: 2025-01-18 | End: 2025-01-19 | Stop reason: HOSPADM

## 2025-01-18 RX ORDER — ACETAMINOPHEN 325 MG/1
650 TABLET ORAL EVERY 8 HOURS PRN
Status: DISCONTINUED | OUTPATIENT
Start: 2025-01-18 | End: 2025-01-19 | Stop reason: HOSPADM

## 2025-01-18 RX ORDER — SODIUM CHLORIDE, SODIUM LACTATE, POTASSIUM CHLORIDE, CALCIUM CHLORIDE 600; 310; 30; 20 MG/100ML; MG/100ML; MG/100ML; MG/100ML
INJECTION, SOLUTION INTRAVENOUS CONTINUOUS
Status: ACTIVE | OUTPATIENT
Start: 2025-01-18 | End: 2025-01-19

## 2025-01-18 RX ORDER — CLOPIDOGREL BISULFATE 75 MG/1
75 TABLET ORAL DAILY
Status: DISCONTINUED | OUTPATIENT
Start: 2025-01-19 | End: 2025-01-19 | Stop reason: HOSPADM

## 2025-01-18 RX ORDER — PROCHLORPERAZINE EDISYLATE 5 MG/ML
5 INJECTION INTRAMUSCULAR; INTRAVENOUS EVERY 6 HOURS PRN
Status: DISCONTINUED | OUTPATIENT
Start: 2025-01-18 | End: 2025-01-19 | Stop reason: HOSPADM

## 2025-01-18 RX ORDER — MUPIROCIN 20 MG/G
OINTMENT TOPICAL 2 TIMES DAILY
Status: DISCONTINUED | OUTPATIENT
Start: 2025-01-19 | End: 2025-01-19 | Stop reason: HOSPADM

## 2025-01-18 RX ORDER — GLUCAGON 1 MG
1 KIT INJECTION
Status: DISCONTINUED | OUTPATIENT
Start: 2025-01-18 | End: 2025-01-19 | Stop reason: HOSPADM

## 2025-01-18 RX ORDER — CILOSTAZOL 50 MG/1
100 TABLET ORAL DAILY
Status: DISCONTINUED | OUTPATIENT
Start: 2025-01-18 | End: 2025-01-19 | Stop reason: HOSPADM

## 2025-01-18 RX ORDER — IBUPROFEN 200 MG
16 TABLET ORAL
Status: DISCONTINUED | OUTPATIENT
Start: 2025-01-18 | End: 2025-01-19 | Stop reason: HOSPADM

## 2025-01-18 RX ORDER — HEPARIN SODIUM 5000 [USP'U]/ML
5000 INJECTION, SOLUTION INTRAVENOUS; SUBCUTANEOUS EVERY 12 HOURS
Status: DISCONTINUED | OUTPATIENT
Start: 2025-01-18 | End: 2025-01-19 | Stop reason: HOSPADM

## 2025-01-18 RX ORDER — INSULIN ASPART 100 [IU]/ML
0-5 INJECTION, SOLUTION INTRAVENOUS; SUBCUTANEOUS
Status: DISCONTINUED | OUTPATIENT
Start: 2025-01-18 | End: 2025-01-19 | Stop reason: HOSPADM

## 2025-01-18 RX ORDER — ALUMINUM HYDROXIDE, MAGNESIUM HYDROXIDE, AND SIMETHICONE 1200; 120; 1200 MG/30ML; MG/30ML; MG/30ML
30 SUSPENSION ORAL ONCE
Status: COMPLETED | OUTPATIENT
Start: 2025-01-18 | End: 2025-01-18

## 2025-01-18 RX ORDER — SODIUM CHLORIDE 0.9 % (FLUSH) 0.9 %
10 SYRINGE (ML) INJECTION
Status: DISCONTINUED | OUTPATIENT
Start: 2025-01-18 | End: 2025-01-19 | Stop reason: HOSPADM

## 2025-01-18 RX ADMIN — ATORVASTATIN CALCIUM 80 MG: 40 TABLET, FILM COATED ORAL at 10:01

## 2025-01-18 RX ADMIN — SODIUM BICARBONATE: 84 INJECTION, SOLUTION INTRAVENOUS at 04:01

## 2025-01-18 RX ADMIN — ALUMINUM HYDROXIDE, MAGNESIUM HYDROXIDE, AND DIMETHICONE 30 ML: 200; 20; 200 SUSPENSION ORAL at 01:01

## 2025-01-18 RX ADMIN — SODIUM CHLORIDE, POTASSIUM CHLORIDE, SODIUM LACTATE AND CALCIUM CHLORIDE: 600; 310; 30; 20 INJECTION, SOLUTION INTRAVENOUS at 10:01

## 2025-01-18 RX ADMIN — SODIUM CHLORIDE, SODIUM LACTATE, POTASSIUM CHLORIDE, AND CALCIUM CHLORIDE 1000 ML: .6; .31; .03; .02 INJECTION, SOLUTION INTRAVENOUS at 01:01

## 2025-01-18 RX ADMIN — SODIUM BICARBONATE 650 MG TABLET 650 MG: at 10:01

## 2025-01-18 RX ADMIN — LIDOCAINE HYDROCHLORIDE 15 ML: 20 SOLUTION ORAL at 01:01

## 2025-01-18 RX ADMIN — CILOSTAZOL 100 MG: 50 TABLET ORAL at 12:01

## 2025-01-18 RX ADMIN — Medication 6 MG: at 10:01

## 2025-01-18 RX ADMIN — MAGNESIUM SULFATE HEPTAHYDRATE 1 G: 1 INJECTION, SOLUTION INTRAVENOUS at 04:01

## 2025-01-18 RX ADMIN — SODIUM BICARBONATE 650 MG TABLET 650 MG: at 11:01

## 2025-01-18 NOTE — H&P
[facility] Hospital Medicine History & Physical Examination       Patient Name: Osiris Mae  MRN: [id number]  Patient Class: IP- Inpatient   Admission Date: 1/18/2025   Admitting Physician: NAHED Service   Length of Stay: 0  Attending Physician: Nellie Melo MD  Primary Care Provider: Bautista Mckenna Sr., MD  Face-to-Face encounter date: 01/18/2025  Code Status: Full code  Chief Complaint: Diarrhea (Diarrhea x 3 days following a nuclear stress test. Also reports frequent belching with foul odor. Immodium started today and not helping. )      Screening for Social Drivers for health:  Patient screened for food insecurity, housing instability, transportation needs, utility difficulties, and interpersonal safety (select all that apply as identified as concern)  []Housing or Food  []Transportation Needs  []Utility Difficulties  []Interpersonal safety  [x]None      Patient information was obtained from patient, patient's family, past medical records and ER records.  ED records were reviewed in detail and documented below    HISTORY OF PRESENT ILLNESS:   74 year old woman with PMHx of HTN, T2DM, chronic HFpEF, CKD IIIb, PAD s/p stent, stage I breast cancer s/p lumpectomy and stage III colon cancer s/p R hemicolectomy presented for N/V and diarrhea for the past 2 days.    Patient had a routine outpatient stress test in Wednesday. After eating dinner she reports diarrhea 4-5 times and is watery. Patient denies abdominal pain. Mild nausea and one episode of vomiting. No fevers or chills. No recent travel history. No sick contacts.    On admission afebrile but with CLIFF on top of her CKD. Daughter at bedside.       PAST MEDICAL HISTORY:     Past Medical History:   Diagnosis Date    Breast cancer     Colon cancer     Diabetes mellitus, type 2     HTN (hypertension)     Hyperlipidemia        PAST SURGICAL HISTORY:     Past Surgical History:   Procedure Laterality Date    BREAST LUMPECTOMY  Right 2007     SECTION      x2    COLECTOMY      right axillary lymph node dissection      right knee scope  2011       ALLERGIES:   Patient has no known allergies.    FAMILY HISTORY:   Reviewed and negative    SOCIAL HISTORY:     Social History     Tobacco Use    Smoking status: Never    Smokeless tobacco: Never   Substance Use Topics    Alcohol use: Never        HOME MEDICATIONS:     Prior to Admission medications    Medication Sig Start Date End Date Taking? Authorizing Provider   cilostazoL (PLETAL) 100 MG Tab Take 100 mg by mouth once daily. 22  Yes Provider, Historical   clopidogreL (PLAVIX) 75 mg tablet Take 75 mg by mouth once daily. 22  Yes Provider, Historical   FARXIGA 5 mg Tab tablet Take 5 mg by mouth.   Yes Provider, Historical   HYDROcodone-acetaminophen (NORCO)  mg per tablet Take 1 tablet by mouth 3 (three) times daily. 22  Yes Provider, Historical   iron-vitamin C 100-250 mg, ICAR-C, 100-250 mg Tab Take 1 tablet by mouth once daily. 22  Yes Provider, Historical   meloxicam (MOBIC) 7.5 MG tablet Take 7.5 mg by mouth once daily. 22  Yes Provider, Historical   olmesartan-hydrochlorothiazide (BENICAR HCT) 20-12.5 mg per tablet Take 1 tablet by mouth once daily.   Yes Provider, Historical   semaglutide (OZEMPIC) 0.25 mg or 0.5 mg(2 mg/1.5 mL) pen injector Inject 0.5 mg into the skin every 7 days.   Yes Provider, Historical   sodium bicarbonate 650 MG tablet Take 650 mg by mouth 2 (two) times daily. 23  Yes Provider, Historical   sodium citrate-citric acid 500-334 mg/5 ml (BICITRA) 500-334 mg/5 mL solution Take 10 mLs by mouth 2 (two) times daily. 23  Yes Provider, Historical   atorvastatin (LIPITOR) 80 MG tablet TAKE 1 TABLET BY MOUTH ONCE DAILY AT NIGHT AT BEDTIME FOR 30 DAYS 10/26/22   Provider, Historical   docosahexaenoic acid 200 mg Cap 0    Provider, Historical   fluticasone propionate (FLONASE) 50 mcg/actuation nasal spray 2 sprays by Each Nostril  route as needed. 11/2/23   Provider, Historical   hydroCHLOROthiazide (HYDRODIURIL) 12.5 MG Tab Take 1 tablet (12.5 mg total) by mouth once daily. for 5 days 11/24/23 11/29/23  Narendra Dior MD   nebivoloL (BYSTOLIC) 10 MG Tab Take 10 mg by mouth once daily. 7/28/22   Provider, Historical   olmesartan (BENICAR) 40 MG tablet Take 40 mg by mouth. 11/2/23   Provider, Historical       REVIEW OF SYSTEMS:   Except as documented, all other systems reviewed and negative     PHYSICAL EXAM:     VITAL SIGNS: 24 HRS MIN & MAX LAST   Temp  Min: 97.9 °F (36.6 °C)  Max: 98.1 °F (36.7 °C) 97.9 °F (36.6 °C)   BP  Min: 91/63  Max: 128/68 103/63   Pulse  Min: 60  Max: 72  63   Resp  Min: 13  Max: 19 18   SpO2  Min: 95 %  Max: 100 % 96 %     General appearance: Well-developed, well-nourished female in no apparent distress.  HENT: Atraumatic head. Moist mucous membranes of oral cavity.  Eyes: Normal extraocular movements.   Neck: Supple.   Lungs: Clear to auscultation bilaterally. No wheezing present.   Heart: Regular rate and rhythm. S1 and S2 present with no murmurs/gallop/rub. No pedal edema. No JVD present.   Abdomen: Soft, non-distended, non-tender. No rebound tenderness/guarding. Bowel sounds are normal.   Extremities: No cyanosis, clubbing, or edema.  Skin: No Rash.   Neuro: Motor and sensory exams grossly intact. Good tone. Muscle strength 5/5 in all 4 extremities  Psych/mental status: Appropriate mood and affect. Responds appropriately to questions.     LABS AND IMAGING:     Recent Labs   Lab 01/18/25  0128 01/18/25  0651   WBC 9.81 8.22   RBC 4.07* 3.86*   HGB 11.7* 11.2*   HCT 36.9* 34.8*   MCV 90.7 90.2   MCH 28.7 29.0   MCHC 31.7* 32.2*   RDW 13.4 13.5    264   MPV 9.5 9.8       Recent Labs   Lab 01/18/25  0128 01/18/25  0651    134*   K 3.2* 3.6   * 108*   CO2 11* 15*   BUN 48.5* 52.7*   CREATININE 2.27* 2.26*   CALCIUM 8.7 9.2   ALBUMIN 3.8 3.7   ALKPHOS 85 84   ALT 13 10   AST 14 12   BILITOT 0.6 0.6        Microbiology Results (last 7 days)       Procedure Component Value Units Date/Time    Clostridium Diff Toxin, A & B, EIA [[id number]]  (Normal) Collected: 01/18/25 0112    Order Status: Completed Specimen: Stool Updated: 01/18/25 0159     Clostridium Difficile GDH Antigen Negative     Clostridium Difficile Toxin A/B Negative             US Retroperitoneal Complete  Narrative: EXAMINATION:  US RETROPERITONEAL COMPLETE    CLINICAL HISTORY:  CLIFF;    COMPARISON:  24 April 2024    FINDINGS:  Grayscale and color Doppler sonographic evaluation of the kidneys and urinary bladder.    The right kidney measures 9-10 cm. The left kidney measures 9-10 cm.   No hydronephrosis.  There is similar 2-3 cm calcification at the lower pole of the right kidney.  There is a simple appearing 3 cm left renal cyst for which no follow-up is needed.    No significant abnormality of the urinary bladder.    Suspect hepatic steatosis.  Impression: No hydronephrosis.    Electronically signed by: Fuad Moore  Date:    01/18/2025  Time:    11:03      ASSESSMENT & PLAN:   # Diarrhea  # N/V  # Likely viral gastroenteritis  # CLIFF on CKD IIIb  # PAD s/p stent  # HTN  # T2DM  # Morbid obesity  # NAGMA  # Hypokalemia   # Normocytic anemia  # Chronic HFpEF - compensated  # stage I breast cancer s/p lumpectomy and stage III colon cancer s/p R hemicolectomy (not on treatement)    - IV fluids  - start sodium bicarb  - GI panel and C.diff negative  - obtain stool culture  - continue plavix and cilostazol  - replete electrolytes  - hold farxiga and olmesartan  - US kidney  - nephrology consult  - obtain UA  - monitor off Abx unless spikes a fever  - obtain Bcx  - insulin sliding scale  - PT/OT        VTE Prophylaxis: heparin SQ    Patient condition:  Fair    I spent 95 mins on this admission      Nellie Melo MD  Department of Hospital Medicine   [facility]    01/18/2025    __________________________________________________________________________  INPATIENT LIST OF MEDICATIONS     Scheduled Meds:   atorvastatin  80 mg Oral QHS    cilostazoL  100 mg Oral Daily    [START ON 1/19/2025] mupirocin   Nasal BID    sodium bicarbonate  650 mg Oral BID     Continuous Infusions:   lactated ringers   Intravenous Continuous 100 mL/hr at 01/18/25 1009 New Bag at 01/18/25 1009     PRN Meds:.  Current Facility-Administered Medications:     acetaminophen, 650 mg, Oral, Q8H PRN    melatonin, 6 mg, Oral, Nightly PRN    ondansetron, 4 mg, Intravenous, Q8H PRN    prochlorperazine, 5 mg, Intravenous, Q6H PRN    sodium chloride 0.9%, 10 mL, Intravenous, PRN      01/18/2025    ________________________________________________________________________________    Nellie Melo, MD   01/18/2025

## 2025-01-18 NOTE — ED PROVIDER NOTES
Encounter Date: 2025       History     Chief Complaint   Patient presents with    Diarrhea     Diarrhea x 3 days following a nuclear stress test. Also reports frequent belching with foul odor. Immodium started today and not helping.      74-year-old female with a history of hypertension, diabetes presents to the emergency department for evaluation of 3 days of loose stools with the accompanying excessive belching.  She denies any fever or chills.  Reports symptoms started after a nuclear medicine stress test.  Denies any chest pain or shortness of breath.  No known sick contacts.  Denies any hematochezia or melena.  States took Imodium ED x3 today, last dose around 9:30pm, no BM since that time; however complaining of generalized weakness.    The history is provided by the patient.     Review of patient's allergies indicates:  No Known Allergies  Past Medical History:   Diagnosis Date    Breast cancer     Colon cancer     Diabetes mellitus, type 2     HTN (hypertension)     Hyperlipidemia      Past Surgical History:   Procedure Laterality Date    BREAST LUMPECTOMY Right 2007     SECTION      x2    COLECTOMY      right axillary lymph node dissection      right knee scope       Family History   Problem Relation Name Age of Onset    Breast cancer Mother      Stroke Father      Breast cancer Sister      Lung cancer Brother       Social History     Tobacco Use    Smoking status: Never    Smokeless tobacco: Never   Substance Use Topics    Alcohol use: Never    Drug use: Never     Review of Systems   Constitutional:  Negative for fever.   Respiratory:  Negative for shortness of breath.    Cardiovascular:  Negative for chest pain.   Gastrointestinal:  Positive for diarrhea. Negative for abdominal pain, nausea and vomiting.   Skin:  Negative for wound.       Physical Exam     Initial Vitals [25 2353]   BP Pulse Resp Temp SpO2   110/71 65 18 98.1 °F (36.7 °C) 100 %      MAP       --         Physical  Exam    Nursing note and vitals reviewed.  Constitutional: She appears well-developed and well-nourished. No distress.   HENT:   Head: Normocephalic and atraumatic. Mouth/Throat: Oropharynx is clear and moist.   Eyes: No scleral icterus.   Neck: Neck supple.   Cardiovascular:  Normal rate and regular rhythm.           Pulmonary/Chest: No respiratory distress.   Abdominal: Abdomen is soft. She exhibits no distension. There is no abdominal tenderness. There is no rebound and no guarding.   Musculoskeletal:      Cervical back: Neck supple.     Neurological: She is alert and oriented to person, place, and time.   Skin: Skin is warm and dry.         ED Course   Critical Care    Date/Time: 1/18/2025 12:09 AM    Performed by: Kesha Knight MD  Authorized by: Kesha Knight MD  Direct patient critical care time: 16 minutes  Additional history critical care time: 3 minutes  Ordering / reviewing critical care time: 6 minutes  Documentation critical care time: 4 minutes  Consulting other physicians critical care time: 5 minutes  Total critical care time (exclusive of procedural time) : 34 minutes  Critical care time was exclusive of separately billable procedures and treating other patients.  Critical care was necessary to treat or prevent imminent or life-threatening deterioration of the following conditions: metabolic crisis and dehydration.  Critical care was time spent personally by me on the following activities: development of treatment plan with patient or surrogate, discussions with consultants, interpretation of cardiac output measurements, evaluation of patient's response to treatment, examination of patient, obtaining history from patient or surrogate, ordering and performing treatments and interventions, ordering and review of laboratory studies, re-evaluation of patient's condition and review of old charts.        Labs Reviewed   COMPREHENSIVE METABOLIC PANEL - Abnormal       Result Value    Sodium 137       Potassium 3.2 (*)     Chloride 113 (*)     CO2 11 (*)     Glucose 144 (*)     Blood Urea Nitrogen 48.5 (*)     Creatinine 2.27 (*)     Calcium 8.7      Protein Total 8.4 (*)     Albumin 3.8      Globulin 4.6 (*)     Albumin/Globulin Ratio 0.8 (*)     Bilirubin Total 0.6      ALP 85      ALT 13      AST 14      eGFR 22      Anion Gap 13.0      BUN/Creatinine Ratio 21     CBC WITH DIFFERENTIAL - Abnormal    WBC 9.81      RBC 4.07 (*)     Hgb 11.7 (*)     Hct 36.9 (*)     MCV 90.7      MCH 28.7      MCHC 31.7 (*)     RDW 13.4      Platelet 277      MPV 9.5      Neut % 76.7      Lymph % 11.5      Mono % 9.8      Eos % 1.4      Basophil % 0.2      Imm Grans % 0.4      Neut # 7.52      Lymph # 1.13      Mono # 0.96      Eos # 0.14      Baso # 0.02      Imm Gran # 0.04      NRBC% 0.0     CLOSTRIDIUM DIFFICILE TOXIN A AND B, EIA - Normal    Clostridium Difficile GDH Antigen Negative      Clostridium Difficile Toxin A/B Negative     LIPASE - Normal    Lipase Level 42     CBC W/ AUTO DIFFERENTIAL    Narrative:     The following orders were created for panel order CBC auto differential.  Procedure                               Abnormality         Status                     ---------                               -----------         ------                     CBC with Differential[[id number]]       Abnormal            Final result                 Please view results for these tests on the individual orders.   GI PANEL   RESPIRATORY PANEL          Imaging Results    None          Medications   lactated ringers bolus 1,000 mL (1,000 mLs Intravenous New Bag 1/18/25 0130)   sodium bicarbonate 150 mEq in D5W 1,000 mL infusion (has no administration in time range)   aluminum-magnesium hydroxide-simethicone 200-200-20 mg/5 mL suspension 30 mL (30 mLs Oral Given 1/18/25 0130)     And   LIDOcaine viscous HCl 2% oral solution 15 mL (15 mLs Oral Given 1/18/25 0130)     Medical Decision Making  Problems Addressed:  Acute kidney injury  superimposed on chronic kidney disease: acute illness or injury that poses a threat to life or bodily functions  Diarrhea, unspecified type: acute illness or injury  Metabolic acidosis: acute illness or injury that poses a threat to life or bodily functions    Amount and/or Complexity of Data Reviewed  Labs: ordered.    Risk  OTC drugs.  Prescription drug management.  Decision regarding hospitalization.      ED assessment:    Ms. Mae presented for evaluation of diarrhea for the last several days with the accompanying excessive belching.  Afebrile, nontoxic appearing, hemodynamically stable.  Benign abdominal examination.  Borderline low BP in ED, complaining of generalized weakness.    Differential diagnosis (including but not limited to):   Acute viral syndrome, gastroenteritis, diarrhea, dehydration, acute kidney injury, electrolyte derangements    ED management:   See ED course below.  Discussed with hospital medicine who accepts for admission.      Amount and/or Complexity of Data Reviewed  Independent historian: daughter    Summary of history:  Daughter reports when mother complained of generalized weakness tonight, prompted her to come to the emergency department (wanted her to go to Urgent Care earlier today). Taking imodium AD OTC x 3 today.   External data reviewed: notes from clinic visits  Summary of data reviewed:   Hx breast/colon CA 6/2018. S/p[ R hemicolectomy and resection terminal ileum.   Last visit 4/24 w/ hem-onc w/ no evidence of recurrent malignancy  Previous chemistry from 04/20/2024 with creatinine 1.35, GFR 41    Risk and benefits of testing: discussed   Labs: ordered and reviewed  Radiology: ordered and independent interpretation performed (see above or ED course)    Discussion of management or test interpretation with external provider(s): discussed with hospitalist physician   Summary of discussion: discussed with hospitalist who accepts for admission    Risk  Prescription drug  management   Decision regarding hospitalization  Shared decision making     Critical Care  30-74 minutes     Kesha WEBB MD personally performed the history, PE, MDM, and procedures as documented above and agree with the scribe's documentation.              ED Course as of 01/18/25 0215   Sat Jan 18, 2025   0201 CBC with no significant leukocytosis or anemia.  Chemistry panel notable for acute kidney injuries to be remarkable cell chronic kidney disease with hyperchloremia, significant metabolic acidosis.  IV fluids has been given, will add on bicarb drip and plan for admission for further volume resuscitation, correction of metabolic derangements.  Awaiting stool studies at this time. [KS]   0202 Paged hospitalist. [RB]      ED Course User Index  [KS] Kesha Knight MD  [RB] Isabel Rhoades                           Clinical Impression:  Final diagnoses:  [N17.9, N18.9] Acute kidney injury superimposed on chronic kidney disease (Primary)  [E87.20] Metabolic acidosis  [R19.7] Diarrhea, unspecified type          ED Disposition Condition    Admit Stable                Kesha Knight MD  01/18/25 0215

## 2025-01-18 NOTE — CONSULTS
Nephrology  Consults  Chief Complaint   Patient presents with    Diarrhea     Diarrhea x 3 days following a nuclear stress test. Also reports frequent belching with foul odor. Immodium started today and not helping.      HPI:  74-year-old female, consulted for CLIFF with CKD.  She developed significant diarrhea 3 days ago.  No fever chills nausea or vomiting.  Tried Imodium without improvement.  Presented to the ED and found to have sodium 134, chloride 108, bicarbonate 15, BUN 53, creatinine 2.26.  Viral panel, GI panel and C diff were negative.  She received 1 L bicarb drip and then started on LR and oral bicarbonate.  She is feeling much better now although she has had 1 diarrhea stool today.     Review of Systems   All other systems negative except for HPI      Past Medical History:   Diagnosis Date    Breast cancer     Colon cancer     Diabetes mellitus, type 2     HTN (hypertension)     Hyperlipidemia         Past Surgical History:   Procedure Laterality Date    BREAST LUMPECTOMY Right 2007     SECTION      x2    COLECTOMY      right axillary lymph node dissection      right knee scope          Family History   Problem Relation Name Age of Onset    Breast cancer Mother      Stroke Father      Breast cancer Sister      Lung cancer Brother          Social History     Socioeconomic History    Marital status:    Tobacco Use    Smoking status: Never    Smokeless tobacco: Never   Substance and Sexual Activity    Alcohol use: Never    Drug use: Never     Social Drivers of Health     Financial Resource Strain: Low Risk  (2025)    Overall Financial Resource Strain (CARDIA)     Difficulty of Paying Living Expenses: Not hard at all   Food Insecurity: No Food Insecurity (2025)    Hunger Vital Sign     Worried About Running Out of Food in the Last Year: Never true     Ran Out of Food in the Last Year: Never true   Transportation Needs: No Transportation Needs (2025)    TRANSPORTATION NEEDS      Transportation : No   Stress: No Stress Concern Present (1/18/2025)    [employer] - Occupational Stress Questionnaire     Feeling of Stress : Not at all   Housing Stability: Low Risk  (1/18/2025)    Housing Stability Vital Sign     Unable to Pay for Housing in the Last Year: No     Homeless in the Last Year: No      Review of patient's allergies indicates:  No Known Allergies  Current Outpatient Medications   Medication Instructions    atorvastatin (LIPITOR) 80 MG tablet TAKE 1 TABLET BY MOUTH ONCE DAILY AT NIGHT AT BEDTIME FOR 30 DAYS    cilostazoL (PLETAL) 100 mg, Daily    clopidogreL (PLAVIX) 75 mg, Daily    docosahexaenoic acid 200 mg Cap 0    FARXIGA 5 mg    fluticasone propionate (FLONASE) 50 mcg/actuation nasal spray 2 sprays, Each Nostril, As needed (PRN)    hydroCHLOROthiazide (HYDRODIURIL) 12.5 mg, Oral, Daily    HYDROcodone-acetaminophen (NORCO)  mg per tablet 1 tablet, 3 times daily    iron-vitamin C 100-250 mg, ICAR-C, 100-250 mg Tab 1 tablet, Daily    meloxicam (MOBIC) 7.5 mg, Daily    nebivoloL (BYSTOLIC) 10 mg, Oral, Daily    olmesartan (BENICAR) 40 mg, Oral    olmesartan-hydrochlorothiazide (BENICAR HCT) 20-12.5 mg per tablet 1 tablet, Daily    OZEMPIC 0.5 mg, Every 7 days    sodium bicarbonate 650 mg, 2 times daily    sodium citrate-citric acid 500-334 mg/5 ml (BICITRA) 500-334 mg/5 mL solution 10 mLs, 2 times daily     Objective   VITAL SIGNS: 24 HR MIN & MAX LAST    Temp  Min: 97.9 °F (36.6 °C)  Max: 98.1 °F (36.7 °C)  98 °F (36.7 °C)        BP  Min: 91/63  Max: 128/68  117/74     Pulse  Min: 60  Max: 72  71     Resp  Min: 13  Max: 19  18    SpO2  Min: 95 %  Max: 100 %  98 %      Wt Readings from Last 3 Encounters:   01/17/25 108.4 kg (239 lb)   07/30/24 110.8 kg (244 lb 4.8 oz)   04/29/24 105.9 kg (233 lb 8 oz)       Intake/Output Summary (Last 24 hours) at 1/18/2025 1741  Last data filed at 1/18/2025 0230  Gross per 24 hour   Intake 1000 ml   Output --    Net 1000 ml     General:  Alert and oriented  Psychiatric:  Cooperative, Appropriate mood & affect.    Eye:  Within normal limits, Normal conjunctiva.    HENT:  Normocephalic, Oral mucosa is moist.    Respiratory: Bilaterally symmetrical, un-labored.    Cardiovascular:  Normal rate, Regular rhythm, No murmur, No edema.    Gastrointestinal:  Obese Soft, Normal bowel sounds.    Musculoskeletal:  Normal strength, No deformity.    Integumentary:  Warm, No rash.    Recent Labs     01/18/25 0128 01/18/25  0651    134*   K 3.2* 3.6   CO2 11* 15*   BUN 48.5* 52.7*   CREATININE 2.27* 2.26*   GLUCOSE 144* 149*   CALCIUM 8.7 9.2   ALBUMIN 3.8 3.7      Recent Labs     01/18/25 0128 01/18/25  0651   WBC 9.81 8.22   HGB 11.7* 11.2*    264     US Retroperitoneal Complete   Final Result      No hydronephrosis.         Electronically signed by: Fuad Moore   Date:    01/18/2025   Time:    11:03           ASSESSMENT PLAN    Diarrhea    ATN.  volume depletion with concomitant meloxicam, ARB, HCTZ, SGLT2i use.  Continue volume resuscitation.  Monitor labs in a.m.  CKD 3b.  Baseline creatinine 1.3  Metabolic acidosis of CLIFF.  S/p bicarb drip.  Continue oral bicarbonate  HTN.  BP currently low from volume depletion.  Monitor and resume blood pressure medicines as needed.  Avoid diuretics and ARB while with CLIFF  DM2  Gastroenteritis.  Continue symptomatic treatment  PAD. H/o BLE stenting  H/o breast cancer.  S/p chemo  H/o colon cancer.  S/p sx, chemo            This is a medical document written in a [id number] manner with standard medical terminology and conventions. It is a communication tool for medical professional. It is not intended for the lay public. Any inference of insensitivity or offence is solely a product with the reader's imagination. This is a document written devoid of emotion, as it is a medical document and it's only purpose is to convey information to medical professionals.

## 2025-01-19 VITALS
HEART RATE: 68 BPM | DIASTOLIC BLOOD PRESSURE: 70 MMHG | RESPIRATION RATE: 18 BRPM | TEMPERATURE: 98 F | OXYGEN SATURATION: 100 % | BODY MASS INDEX: 40.4 KG/M2 | WEIGHT: 242.5 LBS | HEIGHT: 65 IN | SYSTOLIC BLOOD PRESSURE: 154 MMHG

## 2025-01-19 LAB
ALBUMIN SERPL-MCNC: 3.4 G/DL (ref 3.4–4.8)
ALBUMIN/GLOB SERPL: 1 RATIO (ref 1.1–2)
ALP SERPL-CCNC: 83 UNIT/L (ref 40–150)
ALT SERPL-CCNC: 8 UNIT/L (ref 0–55)
ANION GAP SERPL CALC-SCNC: 11 MEQ/L
ANION GAP SERPL CALC-SCNC: 11 MEQ/L
AST SERPL-CCNC: 11 UNIT/L (ref 5–34)
BACTERIA #/AREA URNS AUTO: ABNORMAL /HPF
BASOPHILS # BLD AUTO: 0.02 X10(3)/MCL
BASOPHILS NFR BLD AUTO: 0.3 %
BILIRUB SERPL-MCNC: 0.6 MG/DL
BILIRUB UR QL STRIP.AUTO: NEGATIVE
BUN SERPL-MCNC: 38 MG/DL (ref 9.8–20.1)
BUN SERPL-MCNC: 43.4 MG/DL (ref 9.8–20.1)
CALCIUM SERPL-MCNC: 8.7 MG/DL (ref 8.4–10.2)
CALCIUM SERPL-MCNC: 9 MG/DL (ref 8.4–10.2)
CHLORIDE SERPL-SCNC: 108 MMOL/L (ref 98–107)
CHLORIDE SERPL-SCNC: 109 MMOL/L (ref 98–107)
CLARITY UR: ABNORMAL
CO2 SERPL-SCNC: 18 MMOL/L (ref 23–31)
CO2 SERPL-SCNC: 18 MMOL/L (ref 23–31)
COLOR UR AUTO: ABNORMAL
CREAT SERPL-MCNC: 1.5 MG/DL (ref 0.55–1.02)
CREAT SERPL-MCNC: 1.71 MG/DL (ref 0.55–1.02)
CREAT/UREA NIT SERPL: 25
CREAT/UREA NIT SERPL: 25
EOSINOPHIL # BLD AUTO: 0.31 X10(3)/MCL (ref 0–0.9)
EOSINOPHIL NFR BLD AUTO: 3.9 %
ERYTHROCYTE [DISTWIDTH] IN BLOOD BY AUTOMATED COUNT: 13.4 % (ref 11.5–17)
GFR SERPLBLD CREATININE-BSD FMLA CKD-EPI: 31 ML/MIN/1.73/M2
GFR SERPLBLD CREATININE-BSD FMLA CKD-EPI: 36 ML/MIN/1.73/M2
GLOBULIN SER-MCNC: 3.5 GM/DL (ref 2.4–3.5)
GLUCOSE SERPL-MCNC: 118 MG/DL (ref 82–115)
GLUCOSE SERPL-MCNC: 121 MG/DL (ref 82–115)
GLUCOSE UR QL STRIP: ABNORMAL
HCT VFR BLD AUTO: 33.9 % (ref 37–47)
HGB BLD-MCNC: 10.9 G/DL (ref 12–16)
HGB UR QL STRIP: NEGATIVE
HYALINE CASTS #/AREA URNS LPF: ABNORMAL /LPF
IMM GRANULOCYTES # BLD AUTO: 0.03 X10(3)/MCL (ref 0–0.04)
IMM GRANULOCYTES NFR BLD AUTO: 0.4 %
KETONES UR QL STRIP: NEGATIVE
LEUKOCYTE ESTERASE UR QL STRIP: 25
LYMPHOCYTES # BLD AUTO: 1.24 X10(3)/MCL (ref 0.6–4.6)
LYMPHOCYTES NFR BLD AUTO: 15.6 %
MAGNESIUM SERPL-MCNC: 2.6 MG/DL (ref 1.6–2.6)
MCH RBC QN AUTO: 28.8 PG (ref 27–31)
MCHC RBC AUTO-ENTMCNC: 32.2 G/DL (ref 33–36)
MCV RBC AUTO: 89.7 FL (ref 80–94)
MONOCYTES # BLD AUTO: 0.79 X10(3)/MCL (ref 0.1–1.3)
MONOCYTES NFR BLD AUTO: 10 %
MUCOUS THREADS URNS QL MICRO: ABNORMAL /LPF
NEUTROPHILS # BLD AUTO: 5.54 X10(3)/MCL (ref 2.1–9.2)
NEUTROPHILS NFR BLD AUTO: 69.8 %
NITRITE UR QL STRIP: NEGATIVE
NRBC BLD AUTO-RTO: 0 %
PH UR STRIP: 5 [PH]
PHOSPHATE SERPL-MCNC: 3.1 MG/DL (ref 2.3–4.7)
PLATELET # BLD AUTO: 251 X10(3)/MCL (ref 130–400)
PMV BLD AUTO: 9.6 FL (ref 7.4–10.4)
POCT GLUCOSE: 121 MG/DL (ref 70–110)
POCT GLUCOSE: 141 MG/DL (ref 70–110)
POTASSIUM SERPL-SCNC: 3.7 MMOL/L (ref 3.5–5.1)
POTASSIUM SERPL-SCNC: 4.1 MMOL/L (ref 3.5–5.1)
PROT SERPL-MCNC: 6.9 GM/DL (ref 5.8–7.6)
PROT UR QL STRIP: NEGATIVE
RBC # BLD AUTO: 3.78 X10(6)/MCL (ref 4.2–5.4)
RBC #/AREA URNS AUTO: ABNORMAL /HPF
SODIUM SERPL-SCNC: 137 MMOL/L (ref 136–145)
SODIUM SERPL-SCNC: 138 MMOL/L (ref 136–145)
SP GR UR STRIP.AUTO: 1.01 (ref 1–1.03)
SQUAMOUS #/AREA URNS LPF: ABNORMAL /HPF
URATE CRY UR QL COMP ASSIST: ABNORMAL
UROBILINOGEN UR STRIP-ACNC: NORMAL
WBC # BLD AUTO: 7.93 X10(3)/MCL (ref 4.5–11.5)
WBC #/AREA URNS AUTO: ABNORMAL /HPF

## 2025-01-19 PROCEDURE — 85025 COMPLETE CBC W/AUTO DIFF WBC: CPT

## 2025-01-19 PROCEDURE — 25000003 PHARM REV CODE 250

## 2025-01-19 PROCEDURE — 36415 COLL VENOUS BLD VENIPUNCTURE: CPT | Performed by: STUDENT IN AN ORGANIZED HEALTH CARE EDUCATION/TRAINING PROGRAM

## 2025-01-19 PROCEDURE — 63600175 PHARM REV CODE 636 W HCPCS

## 2025-01-19 PROCEDURE — 83735 ASSAY OF MAGNESIUM: CPT

## 2025-01-19 PROCEDURE — 36415 COLL VENOUS BLD VENIPUNCTURE: CPT

## 2025-01-19 PROCEDURE — 25000003 PHARM REV CODE 250: Performed by: INTERNAL MEDICINE

## 2025-01-19 PROCEDURE — 84100 ASSAY OF PHOSPHORUS: CPT

## 2025-01-19 PROCEDURE — 80053 COMPREHEN METABOLIC PANEL: CPT

## 2025-01-19 RX ORDER — ATORVASTATIN CALCIUM 80 MG/1
80 TABLET, FILM COATED ORAL NIGHTLY
Qty: 90 TABLET | Refills: 3 | Status: SHIPPED | OUTPATIENT
Start: 2025-01-19 | End: 2026-01-19

## 2025-01-19 RX ADMIN — SODIUM BICARBONATE 650 MG TABLET 650 MG: at 09:01

## 2025-01-19 RX ADMIN — HEPARIN SODIUM 5000 UNITS: 5000 INJECTION, SOLUTION INTRAVENOUS; SUBCUTANEOUS at 09:01

## 2025-01-19 RX ADMIN — CILOSTAZOL 100 MG: 50 TABLET ORAL at 09:01

## 2025-01-19 RX ADMIN — CLOPIDOGREL BISULFATE 75 MG: 75 TABLET ORAL at 09:01

## 2025-01-19 RX ADMIN — ACETAMINOPHEN 650 MG: 325 TABLET, FILM COATED ORAL at 06:01

## 2025-01-19 NOTE — PROGRESS NOTES
Inpatient Nutrition Assessment    Admit Date: 1/18/2025   Total duration of encounter: 1 day   Patient Age: 74 y.o.    Nutrition Recommendation/Prescription     Recommend diabetic, low sodium diet.    Communication of Recommendations: reviewed with patient    Nutrition Assessment     Chart Review    Reason Seen: continuous nutrition monitoring and malnutrition screening tool (MST)    Malnutrition Screening Tool Results   Have you recently lost weight without trying?: Unsure  Have you been eating poorly because of a decreased appetite?: Yes   MST Score: 3   Diagnosis:  Diarrhea, nausea, vomiting, likely viral gastroenteritis  CLIFF/CKD    Relevant Medical History: HTN, T2DM, chronic HFpEF, CKD IIIb, PAD s/p stent, stage I breast cancer s/p lumpectomy and stage III colon cancer s/p right hemicolectomy     Scheduled Medications:  atorvastatin, 80 mg, QHS  cilostazoL, 100 mg, Daily  clopidogreL, 75 mg, Daily  heparin (porcine), 5,000 Units, Q12H  mupirocin, , BID  sodium bicarbonate, 650 mg, BID    Continuous Infusions: none       PRN Medications:   acetaminophen, 650 mg, Q8H PRN  dextrose 50%, 12.5 g, PRN  dextrose 50%, 25 g, PRN  glucagon (human recombinant), 1 mg, PRN  glucose, 16 g, PRN  glucose, 24 g, PRN  insulin aspart U-100, 0-5 Units, QID (AC + HS) PRN  melatonin, 6 mg, Nightly PRN  ondansetron, 4 mg, Q8H PRN  prochlorperazine, 5 mg, Q6H PRN  sodium chloride 0.9%, 10 mL, PRN    Calorie Containing IV Medications: no significant kcals from medications at this time    Recent Labs   Lab 01/18/25  0128 01/18/25  0651 01/19/25  0407 01/19/25  1158    134* 137 138   K 3.2* 3.6 3.7 4.1   CALCIUM 8.7 9.2 8.7 9.0   PHOS  --   --  3.1  --    MG  --   --  2.60  --    * 108* 108* 109*   CO2 11* 15* 18* 18*   BUN 48.5* 52.7* 43.4* 38.0*   CREATININE 2.27* 2.26* 1.71* 1.50*   EGFRNORACEVR 22 22 31 36   GLUCOSE 144* 149* 118* 121*   BILITOT 0.6 0.6 0.6  --    ALKPHOS 85 84 83  --    ALT 13 10 8  --    AST 14 12 11   "--    ALBUMIN 3.8 3.7 3.4  --    LIPASE 42  --   --   --    WBC 9.81 8.22 7.93  --    HGB 11.7* 11.2* 10.9*  --    HCT 36.9* 34.8* 33.9*  --      Nutrition Orders:  Diet Heart Healthy      Appetite/Oral Intake: good/% of meals  Factors Affecting Nutritional Intake: none identified  Social Needs Impacting Access to Food: none identified  Food/Episcopal/Cultural Preferences: none reported  Food Allergies: none reported  Last Bowel Movement: 25  Wound(s): no pressure injuries documented at this time     Comments    25 Patient reports decreased intake prior to admission, now improved, reporting good appetite and good intake.    Anthropometrics    Height: 5' 5" (165.1 cm),    Last Weight: 110 kg (242 lb 8.1 oz) (25 1429), Weight Method: Bed Scale  BMI (Calculated): 40.4  BMI Classification: obese grade III (BMI >/=40)     Ideal Body Weight (IBW), Female: 125 lb     % Ideal Body Weight, Female (lb): 191.2 %                    Usual Body Weight (UBW), k.9 kg  % Usual Body Weight: 101.22  % Weight Change From Usual Weight: 1.01 %  Usual Weight Provided By: patient    Wt Readings from Last 5 Encounters:   25 110 kg (242 lb 8.1 oz)   24 110.8 kg (244 lb 4.8 oz)   24 105.9 kg (233 lb 8 oz)   24 102.5 kg (225 lb 15.9 oz)   23 102.5 kg (226 lb)     Weight Change(s) Since Admission: stable  Wt Readings from Last 1 Encounters:   25 1429 110 kg (242 lb 8.1 oz)   25 2353 108.4 kg (239 lb)   Admit Weight: 108.4 kg (239 lb) (25 2353), Weight Method: Bed Scale    Nutrition Goals & Monitoring     Dietitian will monitor: food and beverage intake  Discharge planning: resume home regimen  Nutrition Risk/Follow-Up: low (follow-up in 5-7 days)   Please consult if re-assessment needed sooner.    "

## 2025-01-20 LAB — BACTERIA STL CULT: NORMAL

## 2025-01-23 LAB
BACTERIA BLD CULT: NORMAL
BACTERIA BLD CULT: NORMAL

## 2025-01-29 NOTE — DISCHARGE SUMMARY
[facility] Hospital Medicine Discharge Summary    Admit Date: 1/18/2025  Discharge Date and Time: 01/19/2025  Admitting Physician:  Team  Discharging Physician: Bhanu Porter MD.  Primary Care Physician: Bautista Mckenna Sr., MD  Consults: Hospital Medicine    Discharge Diagnoses:  # Diarrhea  # N/V  # Likely viral gastroenteritis  # CLIFF on CKD IIIb  # PAD s/p stent  # HTN  # T2DM  # Morbid obesity  # NAGMA  # Hypokalemia   # Normocytic anemia  # Chronic HFpEF - compensated  # stage I breast cancer s/p lumpectomy and stage III colon cancer s/p R hemicolectomy (not on treatement)    Hospital Course:   74 year old woman with PMHx of HTN, T2DM, chronic HFpEF, CKD IIIb, PAD s/p stent, stage I breast cancer s/p lumpectomy and stage III colon cancer s/p R hemicolectomy presented for N/V and diarrhea for the past 2 days.     Patient had a routine outpatient stress test in Wednesday. After eating dinner she reports diarrhea 4-5 times and is watery. Patient denies abdominal pain. Mild nausea and one episode of vomiting. No fevers or chills. No recent travel history. No sick contacts.     On admission afebrile but with CLIFF on top of her CKD. Started on IVF with improvement in renal indices.    Pt was seen and examined on the day of discharge. No further episodes of diarrhea. No new complaints. Plan for DC. Cleared by Nephrology.    Vitals:  VITAL SIGNS: 24 HRS MIN & MAX LAST   No data recorded 98 °F (36.7 °C)   No data recorded (!) 154/70   No data recorded  68   No data recorded 18   No data recorded 100 %       Physical Exam:  General: alert lady lying comfortably in bed, in no acute distress  HENT: oral and oropharyngeal mucosa moist, pink, with no erythema or exudates, no ear pain or discharge  Neck: normal neck movement, no lymph nodes or swellings, no JVD or Carotid bruit  Respiratory: clear breathing sounds bilaterally, no crackles, rales, ronchi or wheezes  Cardiovascular: clear S1  "and S2, no murmurs, rubs or gallops  Peripheral Vascular: no lesions, ulcers or erosions, normal peripheral pulses and no pedal edema  Gastrointestinal: soft, non-tender, non-distended abdomen, no guarding, rigidity or rebound tenderness, normal bowel sounds  Integumentary: normal skin color, no rashes or lesions  Neuro: AAO x 3; motor strength 5/5 in B/L UEs & LEs; sensation intact to gross and fine touch B/L; CN II-XII grossly intact    Procedures Performed: No admission procedures for hospital encounter.     Significant Diagnostic Studies: See Full reports for all details    No results for input(s): "WBC", "RBC", "HGB", "HCT", "MCV", "MCH", "MCHC", "RDW", "PLT", "MPV", "GRAN", "LYMPH", "MONO", "BASO", "NRBC" in the last 168 hours.    No results for input(s): "NA", "K", "CL", "CO2", "ANIONGAP", "BUN", "CREATININE", "GLU", "CALCIUM", "PH", "MG", "ALBUMIN", "PROT", "ALKPHOS", "ALT", "AST", "BILITOT" in the last 168 hours.     Microbiology Results (last 7 days)       Procedure Component Value Units Date/Time    Clostridium Diff Toxin, A & B, EIA [[id number]]  (Normal) Collected: 01/18/25 0112    Order Status: Completed Specimen: Stool Updated: 01/18/25 0159     Clostridium Difficile GDH Antigen Negative     Clostridium Difficile Toxin A/B Negative             US Retroperitoneal Complete  Narrative: EXAMINATION:  US RETROPERITONEAL COMPLETE    CLINICAL HISTORY:  CLIFF;    COMPARISON:  24 April 2024    FINDINGS:  Grayscale and color Doppler sonographic evaluation of the kidneys and urinary bladder.    The right kidney measures 9-10 cm. The left kidney measures 9-10 cm.   No hydronephrosis.  There is similar 2-3 cm calcification at the lower pole of the right kidney.  There is a simple appearing 3 cm left renal cyst for which no follow-up is needed.    No significant abnormality of the urinary bladder.    Suspect hepatic steatosis.  Impression: No hydronephrosis.    Electronically signed by: Fuad" Teresa  Date:    01/18/2025  Time:    11:03         Medication List        CHANGE how you take these medications      atorvastatin 80 MG tablet  Commonly known as: LIPITOR  Take 1 tablet (80 mg total) by mouth every evening.  What changed: See the new instructions.            CONTINUE taking these medications      cilostazoL 100 MG Tab  Commonly known as: PLETAL     clopidogreL 75 mg tablet  Commonly known as: PLAVIX     docosahexaenoic acid 200 mg Cap     fluticasone propionate 50 mcg/actuation nasal spray  Commonly known as: FLONASE     HYDROcodone-acetaminophen  mg per tablet  Commonly known as: NORCO     iron-vitamin C 100-250 mg (ICAR-C) 100-250 mg Tab     meloxicam 7.5 MG tablet  Commonly known as: MOBIC     nebivoloL 10 MG Tab  Commonly known as: BYSTOLIC     olmesartan 40 MG tablet  Commonly known as: BENICAR     OZEMPIC 0.25 mg or 0.5 mg(2 mg/1.5 mL) pen injector  Generic drug: semaglutide     sodium bicarbonate 650 MG tablet     sodium citrate-citric acid 500-334 mg/5 ml 500-334 mg/5 mL solution  Commonly known as: BICITRA            STOP taking these medications      FARXIGA 5 mg Tab tablet  Generic drug: dapagliflozin propanediol     hydroCHLOROthiazide 12.5 MG Tab     olmesartan-hydrochlorothiazide 20-12.5 mg per tablet  Commonly known as: BENICAR HCT               Where to Get Your Medications        These medications were sent to [id number] - [place], [place] [facility] [address]  [address] [place] [address]      Phone: [phone]   atorvastatin 80 MG tablet          Explained in detail to the patient about the discharge plan, medications, and follow-up visits. Pt understands and agrees with the treatment plan  Discharge Disposition: Home or Self Care   Discharged Condition: stable  Diet-    Medications Per [place] med rec  Activities as tolerated   Follow-up Information       Bautista Mckenna Sr., MD Follow up on 1/24/2025.    Specialty: Internal Medicine  Why: We  will call you with an appointment date and time.  Contact information:  [facility] [facility] [address]  [phone]                           For further questions contact hospitalist office    Discharge time 33 minutes    For worsening symptoms, chest pain, shortness of breath, increased abdominal pain, high grade fever, stroke or stroke like symptoms, immediately go to the nearest Emergency Room or call 911 as soon as possible.      Bhanu Aguilar M.D.

## 2025-02-07 ENCOUNTER — INFUSION (OUTPATIENT)
Dept: INFUSION THERAPY | Facility: HOSPITAL | Age: 75
End: 2025-02-07
Attending: INTERNAL MEDICINE
Payer: MEDICARE

## 2025-02-07 VITALS
RESPIRATION RATE: 18 BRPM | DIASTOLIC BLOOD PRESSURE: 80 MMHG | SYSTOLIC BLOOD PRESSURE: 138 MMHG | OXYGEN SATURATION: 97 % | HEART RATE: 63 BPM | TEMPERATURE: 98 F

## 2025-02-07 DIAGNOSIS — D64.9 ANEMIA, UNSPECIFIED TYPE: Primary | ICD-10-CM

## 2025-02-07 PROCEDURE — 63600175 PHARM REV CODE 636 W HCPCS: Performed by: INTERNAL MEDICINE

## 2025-02-07 PROCEDURE — 96523 IRRIG DRUG DELIVERY DEVICE: CPT

## 2025-02-07 PROCEDURE — A4216 STERILE WATER/SALINE, 10 ML: HCPCS | Performed by: INTERNAL MEDICINE

## 2025-02-07 PROCEDURE — 25000003 PHARM REV CODE 250: Performed by: INTERNAL MEDICINE

## 2025-02-07 RX ORDER — SODIUM CHLORIDE 0.9 % (FLUSH) 0.9 %
10 SYRINGE (ML) INJECTION
OUTPATIENT
Start: 2025-04-18

## 2025-02-07 RX ORDER — SODIUM CHLORIDE 0.9 % (FLUSH) 0.9 %
10 SYRINGE (ML) INJECTION
Status: DISCONTINUED | OUTPATIENT
Start: 2025-02-07 | End: 2025-02-07 | Stop reason: HOSPADM

## 2025-02-07 RX ORDER — HEPARIN 100 UNIT/ML
500 SYRINGE INTRAVENOUS
OUTPATIENT
Start: 2025-04-18

## 2025-02-07 RX ORDER — HEPARIN 100 UNIT/ML
500 SYRINGE INTRAVENOUS
Status: DISCONTINUED | OUTPATIENT
Start: 2025-02-07 | End: 2025-02-07 | Stop reason: HOSPADM

## 2025-02-07 RX ADMIN — SODIUM CHLORIDE, PRESERVATIVE FREE 10 ML: 5 INJECTION INTRAVENOUS at 11:02

## 2025-02-07 RX ADMIN — HEPARIN 500 UNITS: 100 SYRINGE at 11:02

## 2025-02-07 NOTE — PLAN OF CARE
MPF performed per sterile technique (Q3M); tolerated well; next appointments discussed with patient; discharged home in stable condition.

## 2025-04-29 NOTE — PROGRESS NOTES
HEMATOLOGY/ONCOLOGY OFFICE CLINIC VISIT    Visit Information:    Initial Consultation:8/8/18  Referring Physician:Dr Caraballo  Other Physicians:Dr Chancellor Shane  Code Status:Not addressed    Diagnosis/Problem list:   T2N1M0 Stage III colon cancer --Dx 6/18  [id number] - Stage I Right Breast Cancer (Dx 12/5/2007)     Present Treatment:  Surveillance    Treatment history:    Breast Cancer:  -Right breast lumpectomy and axillary lymph node dissection (12/5/2007)  -No chemotherapy, no XRT  -Femara x 5 years    Colon cancer:  -6/20/2018: right colon hemicolectomy, including segment of terminal ileum: T2N1M0 Stage III   -Xeloda + Oxaloplatin X 4 CYCLES (12 WEEKS) (8/28/18-10/30/18)     Plan of care: Surveillance    Imaging:  PET/CT 8/15/18: Previous right hemicolectomy with enterocolonic anastomosis about the hepatic fracture without local hypermetabolic soft tissue proliferation. Additional suture lines involve the left aspect of the transverse colon close to the the midline which is surrounded by inflammatory groundglass complex mostly along the posterior aspect which covers an approximate area of 3.2 x 2.4 cm. This inflammatory complex is without appearance of a typical mass lesion though is hypermetabolic with SUV of 5.43. Remaining colon is remarkable for noninflamed diverticulosis coli. There is no free fluid. There are no mesenteric or retroperitoneal periaortic hypermetabolic enlarged lymph nodes.  No metastatic lymphadenopathy or solid organs involvement.   CT A/P 11/20/18:liver is normal in size and contour, tiny focal hypodensity  right hepatic lobe, unchanged, measuring 3 mm. No suggestion of recurrent disease. Stable NAVDEEP 4.5 mm nodule.  CTC/A/P 12/2/19: 4 mm pulmonary nodule in the NAVDEEP. liver is diffusely hypodense suggestive of hepatic steatosis. right hemicolectomy with intact anastomotic sutures. No evidence for metastatic disease.  CT C/A/P 6/14/21:No new suspicious findings chest, abdomen or pelvis.  Diverticulitis with mild sigmoid inflammation, correlate clinically for diverticulitis.  CT C/A/P 12/7/2021: Stable 4 mmLUL nodule 2 millimetric left hepatic hypodensities stable. No new or worsening malignant findings identified  CT C/A/P 7/13/22: No evidence of residual or recurrent disease seen, Chronic scarring in the lingula and right lower lobe, Stable appearing left renal cyst and area of cortical calcification right kidney, Hepatic steatosis  CT C/A/P 1/5/2023: 1.  No metastatic findings identified.   2.  Stable chronic changes with details above.  CT C/A/P 4/24/2024:   No acute process or evidence of recurrent malignancy or metastatic disease to the chest abdomen or pelvis.  Surgical changes as above.  CT CAP 04/30/2025: 1. No evidence of recurrent or metastatic disease in the chest abdomen or pelvis. 2. Ovoid nodule in the mid right breast which appears to have slightly enlarged from the prior exam.  This is indeterminate.  Recommend correlation with mammography.    MMG 1/24/2013: Birads cat 2: benign.  MMG 1/23/2015: Birads cat 2: benign.  MMG 1/25/2016: Birads cat 2: benign.  MMG 6/7/2021:  Birads cat 2: benign. She does mammogram [facility]       Pathology:  12/7/2007, Right breast, lumpectomy:  1. Ductal carcinoma in situ, high nuclear grade, with comedo necrosis and foci of microinvasion.  2. Tumor extends into lobules.  3. Surgical margins of excision free of tumor.  4. Non-neoplastic breast parenchyma demonstrates fibrocystic changes without atypia.  Right axillary node dissection:19 of 19 lymph node negative for metastatic adeno carcinoma  ER > 50%, AK > 50%, Her 2 Adeola 0, neg    5/30/2018:   Right colon mass biopsy:  Adeno carcinoma, moderately differentiated  Sigmoid colon polyp: Hyperplastic polyp early formation    6/20/2018: right colon hemicolectomy, including segment of terminal ileum:  Invasive colonic adenocarcinoma, grade 2.  Tumor measures 3.17 m in maximum dimension.  Tumor focally extends  into, but not beyond the muscularis propria.  Surgical margins free of tumor.  Appendix free of tumor and transmural cute inflammation.  Diverticulosis.  2/17 pericolonic lymph nodes positive for metastatic colonic adenocarcinoma.        CLINICAL HISTORY:       Patient: Ms. Mae is a 73 y/o female with a hsitory of DCIS that went for a regular followup with Dr Shane and routine blood counts showed anemia. She was referred to GI and first colonoscopy on 5/29/18 showed mass in her  proximal ascending colon on the opposite side of the lumen from the ileocecal valve. Pathology revealed adenocarcinoma, moderately differentiated.     On 6/20/2018, she underwent right colon, hemicolectomy, including segment of terminal ileum. Pathology revealed invasive colonic adenocarcinoma, grade 2 measuring 3.1 cm in maximum dimension extending into but not beyond the muscularis papaya with surgical margins free of tumor. 2 of 17 pericolonic lymph nodes were positive for metastatic colonic adenocarcinoma. Dr. Jarrod Caraballo performed her surgery. CEA prior to surgery was 178. Preop CT abdomen and pelvis showed right lower quadrant mesenteric mass and no other signs of metastatic disease.     According to [facility] documentation from 3/15/2018, patient is up-to-date with her annual mammograms although we will have to request this documentation.     Chief Complaint: 1 Year Follow Up      Interval History:  ** She completed adjuvant XELOX on 10/30/2018. ** Last colonoscopy in 9/28/2021 by Dr Garcia- diverticulitis.     05/05/25: Patient presents to clinic today for follow up in surveillance for colon and breast cancer, with CT CAP results. CT discussed in detail and all questions answered.  She is doing well. Denies fever, chills, sweats. No chest pain or shortness of breath. No  blood in urine or stools. No changes in bowel pattern.  Primary care physician order her mammograms but she has not have mammogram in 2 years and she is not sure  "what happened.  We will reorder the mammogram as CT scan of the chest abdomen and pelvis showed changes in the right breast.       ROS:All 14 points ROS taken and positive as per Interval History, all other negative.  Review of Systems   Constitutional:  Negative for chills, fever and weight loss.   HENT:  Negative for congestion and nosebleeds.    Eyes:  Negative for blurred vision, double vision and photophobia.   Respiratory:  Negative for cough, hemoptysis and shortness of breath.    Cardiovascular:  Negative for chest pain, palpitations, leg swelling and PND.   Gastrointestinal:  Negative for abdominal pain, blood in stool, constipation, diarrhea, melena, nausea and vomiting.   Genitourinary:  Negative for dysuria, frequency, hematuria and urgency.   Musculoskeletal:  Negative for back pain, falls and myalgias.   Skin:  Negative for itching and rash.   Neurological:  Negative for tremors, focal weakness, seizures, weakness and headaches.   Endo/Heme/Allergies:  Negative for environmental allergies. Does not bruise/bleed easily.   Psychiatric/Behavioral:  Negative for depression and suicidal ideas. The patient is not nervous/anxious.          Histories:  PMH/PSH/FH/SOCIAL/ALLERGIES AND MEDS REVIEWED AND UPDATED AS APPROPRIATE       Vitals:    05/05/25 1119   BP: (!) 144/84   BP Location: Left arm   Patient Position: Sitting   Pulse: 70   Resp: 18   Temp: 97.3 °F (36.3 °C)   TempSrc: Oral   SpO2: 99%   Weight: 111.2 kg (245 lb 1.6 oz)   Height: 5' 5" (1.651 m)     Wt Readings from Last 6 Encounters:   05/05/25 111.2 kg (245 lb 1.6 oz)   01/19/25 110 kg (242 lb 8.1 oz)   07/30/24 110.8 kg (244 lb 4.8 oz)   04/29/24 105.9 kg (233 lb 8 oz)   04/24/24 102.5 kg (225 lb 15.9 oz)   11/24/23 102.5 kg (226 lb)     Body mass index is 40.79 kg/m².  Body surface area is 2.26 meters squared.     Physical Exam  Vitals and nursing note reviewed.   Constitutional:       General: She is not in acute distress.     Appearance: She " is morbidly obese.   HENT:      Head: Normocephalic and atraumatic.      Mouth/Throat:      Mouth: Mucous membranes are moist.   Eyes:      General: No scleral icterus.     Extraocular Movements: Extraocular movements intact.      Conjunctiva/sclera: Conjunctivae normal.      Pupils: Pupils are equal, round, and reactive to light.   Neck:      Vascular: No JVD.   Cardiovascular:      Rate and Rhythm: Normal rate and regular rhythm.      Heart sounds: No murmur heard.  Pulmonary:      Effort: Pulmonary effort is normal.      Breath sounds: Normal breath sounds. No wheezing or rhonchi.   Chest:      Chest wall: No deformity or tenderness.   Breasts:     Right: No mass, nipple discharge, skin change or tenderness.      Left: Normal.   Abdominal:      General: Bowel sounds are normal. There is no distension.      Palpations: Abdomen is soft. There is no mass.      Tenderness: There is no abdominal tenderness.   Musculoskeletal:         General: No swelling or deformity.      Cervical back: Neck supple.   Lymphadenopathy:      Head:      Right side of head: No submental or submandibular adenopathy.      Left side of head: No submental or submandibular adenopathy.      Cervical: No cervical adenopathy.      Upper Body:      Right upper body: No supraclavicular or axillary adenopathy.      Left upper body: No supraclavicular or axillary adenopathy.      Lower Body: No right inguinal adenopathy. No left inguinal adenopathy.   Skin:     General: Skin is warm.      Coloration: Skin is not jaundiced.      Findings: No lesion or rash.      Nails: There is no clubbing.   Neurological:      General: No focal deficit present.      Mental Status: She is alert and oriented to person, place, and time.      Sensory: Sensation is intact.      Motor: Motor function is intact.      Gait: Gait is intact.   Psychiatric:         Attention and Perception: Attention normal.         Mood and Affect: Mood and affect normal.         Speech:  Speech normal.         Behavior: Behavior is cooperative.         Thought Content: Thought content normal.         Cognition and Memory: Cognition normal.         Judgment: Judgment normal.       ECOG SCORE    1 - Restricted in strenuous activity-ambulatory and able to carry out work of a light nature         Laboratory:  CBC with Differential:  Lab Results   Component Value Date    WBC 5.16 04/30/2025    RBC 4.34 04/30/2025    HGB 12.3 04/30/2025    HCT 39.2 04/30/2025    MCV 90.3 04/30/2025    MCH 28.3 04/30/2025    MCHC 31.4 (L) 04/30/2025    RDW 13.0 04/30/2025     04/30/2025    MPV 9.3 04/30/2025        CMP:  Sodium   Date Value Ref Range Status   04/30/2025 136 136 - 145 mmol/L Final     Potassium   Date Value Ref Range Status   04/30/2025 4.5 3.5 - 5.1 mmol/L Final     Chloride   Date Value Ref Range Status   04/30/2025 102 98 - 107 mmol/L Final     CO2   Date Value Ref Range Status   04/30/2025 24 23 - 31 mmol/L Final     Glucose   Date Value Ref Range Status   04/30/2025 105 82 - 115 mg/dL Final     Blood Urea Nitrogen   Date Value Ref Range Status   04/30/2025 42.8 (H) 9.8 - 20.1 mg/dL Final     Creatinine   Date Value Ref Range Status   04/30/2025 1.41 (H) 0.55 - 1.02 mg/dL Final     Calcium   Date Value Ref Range Status   04/30/2025 9.8 8.4 - 10.2 mg/dL Final     Protein Total   Date Value Ref Range Status   04/30/2025 8.9 (H) 5.8 - 7.6 gm/dL Final     Albumin   Date Value Ref Range Status   04/30/2025 3.8 3.4 - 4.8 g/dL Final     Bilirubin Total   Date Value Ref Range Status   04/30/2025 0.3 <=1.5 mg/dL Final     ALP   Date Value Ref Range Status   04/30/2025 106 40 - 150 unit/L Final     AST   Date Value Ref Range Status   04/30/2025 20 11 - 45 unit/L Final     ALT   Date Value Ref Range Status   04/30/2025 15 0 - 55 unit/L Final     Estimated GFR-Non    Date Value Ref Range Status   11/08/2021 52 mL/min/1.73 m2 Final         Assessment:       1. Malignant neoplasm of ascending  colon    2. History of chemotherapy    3. Chemotherapy-induced neuropathy    4. Ductal carcinoma in situ (DCIS) of right breast    5. History of lumpectomy of right breast    6. History of aromatase inhibitor therapy    7. Lung nodule    8. Anemia, unspecified type    9. Morbid obesity    10. Screening mammogram for breast cancer    11. Other abnormal and inconclusive findings on diagnostic imaging of breast        1) T2N1M0-Stage III colon cancer-- Dx 6/20/2018  --Xeloda + Oxaloplatin X 4 CYCLES (12 WEEKS) - (8/28/18-10/30/18)   --Colonoscopy in 7/2019-- s/p polypectomy x 2.   --Repeated 8/2021- diverticulitis.    --CT C/A/P for surveillance-- 4/2025 with MIGUELITO    2) H/O right DCIS with microinvasion--[id number] - Stage IA Right Breast Cancer (Dx 2007)  --Right breast lumpectomy and axillary lymph node dissection (12/5/2007)  --No chemotherapy  --No radiation therapy  --Hormonal therapy x 5 yrs    3) Chronic anemia-STABLE--most recent HGB normal  4) Solitary pulmonary nodule- 4mm stable--> no described in last CT  5) Comorbidities: HTN, DM, CAD  6) Chemotherapy induced neuropathy-cont to improve  7) morbid obesity-weight loss counseling given      Plan:      Patient continues to do well. No clinical evidence of disease. CEA normal. Will continue surveillance for colon cancer.  Mammograms ordered by PCP but she has not have it in 2 years. Will reorder mammogram but we will do diagnostic to evaluate changes in the right breast seen on CT.  Pt past due for MMG - ordered  US breast if needed  RTC in 3 weeks for results  Mediport flush q 3 months - due next visit  Encouraged to call for any questions or problems  The patient was given ample opportunity to ask questions and they were all answered to satisfaction; patient demonstrated understanding of what we discussed and is agreeable to the plan.    Visit today included increased complexity associated with the care of the episodic problem colon/breast cancer, addressing  and managing the longitudinal care of the patient's colon/breast cancer.       Mary Barakat MD  Hematology/Oncology  [facility]      Professional Services   I, Shabana Engle LPN, acted solely as a scribe for and in the presence of Dr. Mary Barakat, who performed these services.

## 2025-04-30 ENCOUNTER — HOSPITAL ENCOUNTER (OUTPATIENT)
Dept: RADIOLOGY | Facility: HOSPITAL | Age: 75
Discharge: HOME | End: 2025-04-30
Attending: NURSE PRACTITIONER
Payer: MEDICARE

## 2025-04-30 DIAGNOSIS — C18.2 MALIGNANT NEOPLASM OF ASCENDING COLON: ICD-10-CM

## 2025-04-30 PROCEDURE — 25500020 PHARM REV CODE 255: Performed by: NURSE PRACTITIONER

## 2025-04-30 PROCEDURE — 74177 CT ABD & PELVIS W/CONTRAST: CPT | Mod: TC

## 2025-04-30 RX ORDER — DIATRIZOATE MEGLUMINE AND DIATRIZOATE SODIUM 660; 100 MG/ML; MG/ML
30 SOLUTION ORAL; RECTAL
Status: COMPLETED | OUTPATIENT
Start: 2025-04-30 | End: 2025-04-30

## 2025-04-30 RX ADMIN — DIATRIZOATE MEGLUMINE AND DIATRIZOATE SODIUM 30 ML: 660; 100 LIQUID ORAL; RECTAL at 11:04

## 2025-04-30 RX ADMIN — IOHEXOL 75 ML: 350 INJECTION, SOLUTION INTRAVENOUS at 11:04

## 2025-05-05 ENCOUNTER — OFFICE VISIT (OUTPATIENT)
Dept: HEMATOLOGY/ONCOLOGY | Facility: CLINIC | Age: 75
End: 2025-05-05
Payer: MEDICARE

## 2025-05-05 ENCOUNTER — TELEPHONE (OUTPATIENT)
Dept: INFUSION THERAPY | Facility: HOSPITAL | Age: 75
End: 2025-05-05
Payer: MEDICARE

## 2025-05-05 VITALS
TEMPERATURE: 97 F | RESPIRATION RATE: 18 BRPM | SYSTOLIC BLOOD PRESSURE: 144 MMHG | HEART RATE: 70 BPM | DIASTOLIC BLOOD PRESSURE: 84 MMHG | OXYGEN SATURATION: 99 % | WEIGHT: 245.13 LBS | HEIGHT: 65 IN | BODY MASS INDEX: 40.84 KG/M2

## 2025-05-05 DIAGNOSIS — R92.8 OTHER ABNORMAL AND INCONCLUSIVE FINDINGS ON DIAGNOSTIC IMAGING OF BREAST: ICD-10-CM

## 2025-05-05 DIAGNOSIS — Z92.21 HISTORY OF AROMATASE INHIBITOR THERAPY: ICD-10-CM

## 2025-05-05 DIAGNOSIS — G62.0 CHEMOTHERAPY-INDUCED NEUROPATHY: ICD-10-CM

## 2025-05-05 DIAGNOSIS — Z98.890 HISTORY OF LUMPECTOMY OF RIGHT BREAST: ICD-10-CM

## 2025-05-05 DIAGNOSIS — C18.2 MALIGNANT NEOPLASM OF ASCENDING COLON: Primary | ICD-10-CM

## 2025-05-05 DIAGNOSIS — T45.1X5A CHEMOTHERAPY-INDUCED NEUROPATHY: ICD-10-CM

## 2025-05-05 DIAGNOSIS — R91.1 LUNG NODULE: ICD-10-CM

## 2025-05-05 DIAGNOSIS — Z12.31 SCREENING MAMMOGRAM FOR BREAST CANCER: ICD-10-CM

## 2025-05-05 DIAGNOSIS — D64.9 ANEMIA, UNSPECIFIED TYPE: ICD-10-CM

## 2025-05-05 DIAGNOSIS — D05.11 DUCTAL CARCINOMA IN SITU (DCIS) OF RIGHT BREAST: ICD-10-CM

## 2025-05-05 DIAGNOSIS — Z92.21 HISTORY OF CHEMOTHERAPY: ICD-10-CM

## 2025-05-05 DIAGNOSIS — E66.01 MORBID OBESITY: ICD-10-CM

## 2025-05-05 PROBLEM — C50.811 MALIGNANT NEOPLASM OF OVERLAPPING SITES OF RIGHT BREAST IN FEMALE, ESTROGEN RECEPTOR POSITIVE: Status: ACTIVE | Noted: 2022-08-25

## 2025-05-05 PROBLEM — C50.811 MALIGNANT NEOPLASM OF OVERLAPPING SITES OF RIGHT BREAST IN FEMALE, ESTROGEN RECEPTOR POSITIVE: Status: RESOLVED | Noted: 2022-08-25 | Resolved: 2025-05-05

## 2025-05-05 PROBLEM — Z17.0 MALIGNANT NEOPLASM OF RIGHT BREAST IN FEMALE, ESTROGEN RECEPTOR POSITIVE: Status: ACTIVE | Noted: 2025-05-05

## 2025-05-05 PROBLEM — Z17.0 MALIGNANT NEOPLASM OF OVERLAPPING SITES OF RIGHT BREAST IN FEMALE, ESTROGEN RECEPTOR POSITIVE: Status: RESOLVED | Noted: 2022-08-25 | Resolved: 2025-05-05

## 2025-05-05 PROBLEM — Z17.0 MALIGNANT NEOPLASM OF OVERLAPPING SITES OF RIGHT BREAST IN FEMALE, ESTROGEN RECEPTOR POSITIVE: Status: ACTIVE | Noted: 2022-08-25

## 2025-05-05 PROBLEM — C50.911 MALIGNANT NEOPLASM OF RIGHT BREAST IN FEMALE, ESTROGEN RECEPTOR POSITIVE: Status: ACTIVE | Noted: 2025-05-05

## 2025-05-05 PROCEDURE — 3079F DIAST BP 80-89 MM HG: CPT | Mod: CPTII,S$GLB,, | Performed by: INTERNAL MEDICINE

## 2025-05-05 PROCEDURE — 99215 OFFICE O/P EST HI 40 MIN: CPT | Mod: S$GLB,,, | Performed by: INTERNAL MEDICINE

## 2025-05-05 PROCEDURE — 1126F AMNT PAIN NOTED NONE PRSNT: CPT | Mod: CPTII,S$GLB,, | Performed by: INTERNAL MEDICINE

## 2025-05-05 PROCEDURE — 1101F PT FALLS ASSESS-DOCD LE1/YR: CPT | Mod: CPTII,S$GLB,, | Performed by: INTERNAL MEDICINE

## 2025-05-05 PROCEDURE — 1160F RVW MEDS BY RX/DR IN RCRD: CPT | Mod: CPTII,S$GLB,, | Performed by: INTERNAL MEDICINE

## 2025-05-05 PROCEDURE — G2211 COMPLEX E/M VISIT ADD ON: HCPCS | Mod: S$GLB,,, | Performed by: INTERNAL MEDICINE

## 2025-05-05 PROCEDURE — 3077F SYST BP >= 140 MM HG: CPT | Mod: CPTII,S$GLB,, | Performed by: INTERNAL MEDICINE

## 2025-05-05 PROCEDURE — 99999 PR PBB SHADOW E&M-EST. PATIENT-LVL V: CPT | Mod: PBBFAC,,, | Performed by: INTERNAL MEDICINE

## 2025-05-05 PROCEDURE — 1159F MED LIST DOCD IN RCRD: CPT | Mod: CPTII,S$GLB,, | Performed by: INTERNAL MEDICINE

## 2025-05-05 PROCEDURE — 3288F FALL RISK ASSESSMENT DOCD: CPT | Mod: CPTII,S$GLB,, | Performed by: INTERNAL MEDICINE

## 2025-05-05 NOTE — TELEPHONE ENCOUNTER
Patient left clinic after OV with Dr. Barakat. Did not stay for MPF. Shabana MORRISSEY notified about missing appt. Stated she will have it scheduled at next OV in 3 weeks.

## 2025-06-23 DIAGNOSIS — N63.11 MASS OF UPPER OUTER QUADRANT OF RIGHT BREAST: Primary | ICD-10-CM

## 2025-06-26 ENCOUNTER — INFUSION (OUTPATIENT)
Dept: INFUSION THERAPY | Facility: HOSPITAL | Age: 75
End: 2025-06-26
Attending: INTERNAL MEDICINE
Payer: MEDICARE

## 2025-06-26 VITALS
BODY MASS INDEX: 40.15 KG/M2 | SYSTOLIC BLOOD PRESSURE: 146 MMHG | HEART RATE: 72 BPM | DIASTOLIC BLOOD PRESSURE: 58 MMHG | WEIGHT: 241 LBS | RESPIRATION RATE: 18 BRPM | HEIGHT: 65 IN | TEMPERATURE: 98 F | OXYGEN SATURATION: 95 %

## 2025-06-26 DIAGNOSIS — D64.9 ANEMIA, UNSPECIFIED TYPE: Primary | ICD-10-CM

## 2025-06-26 PROCEDURE — 96523 IRRIG DRUG DELIVERY DEVICE: CPT

## 2025-06-26 PROCEDURE — 25000003 PHARM REV CODE 250: Performed by: INTERNAL MEDICINE

## 2025-06-26 PROCEDURE — A4216 STERILE WATER/SALINE, 10 ML: HCPCS | Performed by: INTERNAL MEDICINE

## 2025-06-26 PROCEDURE — 63600175 PHARM REV CODE 636 W HCPCS: Performed by: INTERNAL MEDICINE

## 2025-06-26 RX ORDER — HEPARIN 100 UNIT/ML
500 SYRINGE INTRAVENOUS
OUTPATIENT
Start: 2025-07-18

## 2025-06-26 RX ORDER — SODIUM CHLORIDE 0.9 % (FLUSH) 0.9 %
10 SYRINGE (ML) INJECTION
Status: DISCONTINUED | OUTPATIENT
Start: 2025-06-26 | End: 2025-06-26 | Stop reason: HOSPADM

## 2025-06-26 RX ORDER — SODIUM CHLORIDE 0.9 % (FLUSH) 0.9 %
10 SYRINGE (ML) INJECTION
OUTPATIENT
Start: 2025-07-18

## 2025-06-26 RX ORDER — HEPARIN 100 UNIT/ML
500 SYRINGE INTRAVENOUS
Status: DISCONTINUED | OUTPATIENT
Start: 2025-06-26 | End: 2025-06-26 | Stop reason: HOSPADM

## 2025-06-26 RX ADMIN — Medication 10 ML: at 12:06

## 2025-06-26 RX ADMIN — HEPARIN 500 UNITS: 100 SYRINGE at 12:06

## 2025-06-26 NOTE — PLAN OF CARE
Mediport flushed without difficulty. Tolerated well. Next appts given pt. Dc'd home in stable condition.

## 2025-08-05 NOTE — PROGRESS NOTES
HEMATOLOGY/ONCOLOGY OFFICE CLINIC VISIT      Visit Information:    Initial Consultation:8/8/18  Referring Physician:Dr Caraballo  Other Physicians:Dr Chancellor Shane  Code Status:Not addressed    Diagnosis/Problem list:   T2N1M0 Stage III colon cancer --Dx 6/18  [id number] - Stage I Right Breast Cancer (Dx 12/5/2007)     Present Treatment:  Surveillance    Treatment history:    Breast Cancer:  -Right breast lumpectomy and axillary lymph node dissection (12/5/2007)  -No chemotherapy, no XRT  -Femara x 5 years    Colon cancer:  -6/20/2018: right colon hemicolectomy, including segment of terminal ileum: T2N1M0 Stage III   -Xeloda + Oxaloplatin X 4 CYCLES (12 WEEKS) (8/28/18-10/30/18)     Plan of care: Surveillance    Imaging:  PET/CT 8/15/18: Previous right hemicolectomy with enterocolonic anastomosis about the hepatic fracture without local hypermetabolic soft tissue proliferation. Additional suture lines involve the left aspect of the transverse colon close to the the midline which is surrounded by inflammatory groundglass complex mostly along the posterior aspect which covers an approximate area of 3.2 x 2.4 cm. This inflammatory complex is without appearance of a typical mass lesion though is hypermetabolic with SUV of 5.43. Remaining colon is remarkable for noninflamed diverticulosis coli. There is no free fluid. There are no mesenteric or retroperitoneal periaortic hypermetabolic enlarged lymph nodes.  No metastatic lymphadenopathy or solid organs involvement.   CT A/P 11/20/18:liver is normal in size and contour, tiny focal hypodensity  right hepatic lobe, unchanged, measuring 3 mm. No suggestion of recurrent disease. Stable NAVDEEP 4.5 mm nodule.  CTC/A/P 12/2/19: 4 mm pulmonary nodule in the NAVDEEP. liver is diffusely hypodense suggestive of hepatic steatosis. right hemicolectomy with intact anastomotic sutures. No evidence for metastatic disease.  CT C/A/P 6/14/21:No new suspicious findings chest, abdomen or pelvis.  Diverticulitis with mild sigmoid inflammation, correlate clinically for diverticulitis.  CT C/A/P 12/7/2021: Stable 4 mmLUL nodule 2 millimetric left hepatic hypodensities stable. No new or worsening malignant findings identified  CT C/A/P 7/13/22: No evidence of residual or recurrent disease seen, Chronic scarring in the lingula and right lower lobe, Stable appearing left renal cyst and area of cortical calcification right kidney, Hepatic steatosis  CT C/A/P 1/5/2023: 1.  No metastatic findings identified.   2.  Stable chronic changes with details above.  CT C/A/P 4/24/2024:   No acute process or evidence of recurrent malignancy or metastatic disease to the chest abdomen or pelvis.  Surgical changes as above.  CT CAP 04/30/2025: 1. No evidence of recurrent or metastatic disease in the chest abdomen or pelvis. 2. Ovoid nodule in the mid right breast which appears to have slightly enlarged from the prior exam.  This is indeterminate.  Recommend correlation with mammography.    MMG 1/24/2013: Birads cat 2: benign.  MMG 1/23/2015: Birads cat 2: benign.  MMG 1/25/2016: Birads cat 2: benign.  MMG 6/7/2021:  Birads cat 2: benign. She does mammogram [facility]  MMG 06/17/2025: Suspicious mass in the upper outer right breast. Recommend ultrasound-guided biopsy of this mass and of the right axillary lymph node.  BI-RADS Category BI-RADS 5: Highly suggestive of malignancy. Appropriate action should be taken.   Breast Right US 06/17/2025: Suspicious mass in the upper outer right breast. Recommend ultrasound-guided biopsy of this mass and of the right axillary lymph node.  BI-RADS Category BI-RADS 5: Highly suggestive of malignancy. Appropriate action should be taken.        Pathology:  12/7/2007, Right breast, lumpectomy:  1. Ductal carcinoma in situ, high nuclear grade, with comedo necrosis and foci of microinvasion.  2. Tumor extends into lobules.  3. Surgical margins of excision free of tumor.  4. Non-neoplastic breast  parenchyma demonstrates fibrocystic changes without atypia.  Right axillary node dissection:19 of 19 lymph node negative for metastatic adeno carcinoma  ER > 50%, SD > 50%, Her 2 Adeola 0, neg    5/30/2018:   Right colon mass biopsy:  Adeno carcinoma, moderately differentiated  Sigmoid colon polyp: Hyperplastic polyp early formation    6/20/2018: right colon hemicolectomy, including segment of terminal ileum:  Invasive colonic adenocarcinoma, grade 2.  Tumor measures 3.17 m in maximum dimension.  Tumor focally extends into, but not beyond the muscularis propria.  Surgical margins free of tumor.  Appendix free of tumor and transmural cute inflammation.  Diverticulosis.  2/17 pericolonic lymph nodes positive for metastatic colonic adenocarcinoma.    07/09/2025:   RIGHT BREAST: INVASIVE DUCTAL CARCINOMA   GRADE: INTERMEDIATE   ER: POSITIVE, SD: POSITIVE, HER2: POSITIVE       CLINICAL HISTORY:       Patient: Ms. Mae is a 71 y/o female with a hsitory of DCIS that went for a regular followup with Dr Shane and routine blood counts showed anemia. She was referred to GI and first colonoscopy on 5/29/18 showed mass in her  proximal ascending colon on the opposite side of the lumen from the ileocecal valve. Pathology revealed adenocarcinoma, moderately differentiated.     On 6/20/2018, she underwent right colon, hemicolectomy, including segment of terminal ileum. Pathology revealed invasive colonic adenocarcinoma, grade 2 measuring 3.1 cm in maximum dimension extending into but not beyond the muscularis papaya with surgical margins free of tumor. 2 of 17 pericolonic lymph nodes were positive for metastatic colonic adenocarcinoma. Dr. Jarrod Caraballo performed her surgery. CEA prior to surgery was 178. Preop CT abdomen and pelvis showed right lower quadrant mesenteric mass and no other signs of metastatic disease.     According to [facility] documentation from 3/15/2018, patient is up-to-date with her annual mammograms although we will  have to request this documentation.     Chief Complaint: Results (MMG & US Biopsy Results.)      Interval History:  * She completed adjuvant XELOX on 10/30/2018. * Last colonoscopy in 9/28/2021 by Dr Garcia- diverticulitis.     05/05/25: Patient presents to clinic today for follow up in surveillance for colon and breast cancer, with CT CAP results. CT discussed in detail and all questions answered.  She is doing well. Denies fever, chills, sweats. No chest pain or shortness of breath. No  blood in urine or stools. No changes in bowel pattern.  Primary care physician order her mammograms but she has not have mammogram in 2 years and she is not sure what happened.  We will reorder the mammogram as CT scan of the chest abdomen and pelvis showed changes in the right breast.    08/07/25: Patient presents today for follow up of her MMG, US, and biopsy.   She underwent mammogram on 06/17/2025 that showed suspicious mass in the upper outer right breast.  Ultrasound showed suspicious mass in the right upper right breast and ultrasound-guided biopsy of the mass and axillary lymph node was recommended.  It was read BI-RADS category 5, highly suggestive of malignancy.  She underwent biopsy on 07/09/2025 that showed invasive ductal carcinoma, intermediate grade, ER positive, AZ positive, HER2 positive.  Lymph node biopsy was negative.  She was seen by Dr. Will that refer her back to us for possible neoadjuvant chemotherapy.    She is doing well. Denies fever, chills, sweats. No chest pain or shortness of breath.        ROS:All 14 points ROS taken and positive as per Interval History, all other negative.  Review of Systems   Constitutional:  Negative for chills, fever and weight loss.   HENT:  Negative for congestion and nosebleeds.    Eyes:  Negative for blurred vision, double vision and photophobia.   Respiratory:  Negative for cough, hemoptysis and shortness of breath.    Cardiovascular:  Negative for chest pain,  "palpitations, leg swelling and PND.   Gastrointestinal:  Negative for abdominal pain, blood in stool, constipation, diarrhea, melena, nausea and vomiting.   Genitourinary:  Negative for dysuria, frequency, hematuria and urgency.   Musculoskeletal:  Negative for back pain, falls and myalgias.   Skin:  Negative for itching and rash.   Neurological:  Negative for tremors, focal weakness, seizures, weakness and headaches.   Endo/Heme/Allergies:  Negative for environmental allergies. Does not bruise/bleed easily.   Psychiatric/Behavioral:  Negative for depression and suicidal ideas. The patient is not nervous/anxious.          Histories:  PMH/PSH/FH/SOCIAL/ALLERGIES AND MEDS REVIEWED AND UPDATED AS APPROPRIATE       Vitals:    08/07/25 1024   BP: 135/62   BP Location: Left arm   Patient Position: Sitting   Pulse: 71   Resp: 18   Temp: 98 °F (36.7 °C)   TempSrc: Oral   SpO2: 95%   Weight: 110.2 kg (243 lb)   Height: 5' 5" (1.651 m)       Wt Readings from Last 6 Encounters:   08/07/25 110.2 kg (243 lb)   06/26/25 109.3 kg (241 lb)   05/05/25 111.2 kg (245 lb 1.6 oz)   01/19/25 110 kg (242 lb 8.1 oz)   07/30/24 110.8 kg (244 lb 4.8 oz)   04/29/24 105.9 kg (233 lb 8 oz)     Body mass index is 40.44 kg/m².  Body surface area is 2.25 meters squared.     Physical Exam  Vitals and nursing note reviewed.   Constitutional:       General: She is not in acute distress.     Appearance: She is morbidly obese.   HENT:      Head: Normocephalic and atraumatic.      Mouth/Throat:      Mouth: Mucous membranes are moist.   Eyes:      General: No scleral icterus.     Extraocular Movements: Extraocular movements intact.      Conjunctiva/sclera: Conjunctivae normal.      Pupils: Pupils are equal, round, and reactive to light.   Neck:      Vascular: No JVD.   Cardiovascular:      Rate and Rhythm: Normal rate and regular rhythm.      Heart sounds: No murmur heard.  Pulmonary:      Effort: Pulmonary effort is normal.      Breath sounds: Normal " breath sounds. No wheezing or rhonchi.   Chest:   Breasts:     Right: Mass present. No nipple discharge, skin change or tenderness.      Left: Normal.      Comments: About 2 cm lower quadrant mass, non tender  Abdominal:      General: Bowel sounds are normal. There is no distension.      Palpations: Abdomen is soft. There is no mass.      Tenderness: There is no abdominal tenderness.   Musculoskeletal:         General: No swelling or deformity.      Cervical back: Neck supple.   Lymphadenopathy:      Head:      Right side of head: No submental or submandibular adenopathy.      Left side of head: No submental or submandibular adenopathy.      Cervical: No cervical adenopathy.      Upper Body:      Right upper body: No supraclavicular or axillary adenopathy.      Left upper body: No supraclavicular or axillary adenopathy.      Lower Body: No right inguinal adenopathy. No left inguinal adenopathy.   Skin:     General: Skin is warm.      Coloration: Skin is not jaundiced.      Findings: No lesion or rash.      Nails: There is no clubbing.   Neurological:      General: No focal deficit present.      Mental Status: She is alert and oriented to person, place, and time.      Sensory: Sensation is intact.      Motor: Motor function is intact.      Gait: Gait is intact.   Psychiatric:         Attention and Perception: Attention normal.         Mood and Affect: Mood and affect normal.         Speech: Speech normal.         Behavior: Behavior is cooperative.         Thought Content: Thought content normal.         Cognition and Memory: Cognition normal.         Judgment: Judgment normal.       ECOG SCORE    1 - Restricted in strenuous activity-ambulatory and able to carry out work of a light nature         Laboratory:  CBC with Differential:  Lab Results   Component Value Date    WBC 5.96 08/07/2025    RBC 3.80 (L) 08/07/2025    HGB 11.1 (L) 08/07/2025    HCT 35.3 (L) 08/07/2025    MCV 92.9 08/07/2025    MCH 29.2 08/07/2025     MCHC 31.4 (L) 08/07/2025    RDW 13.5 08/07/2025     08/07/2025    MPV 9.6 08/07/2025        CMP:  Sodium   Date Value Ref Range Status   08/07/2025 140 136 - 145 mmol/L Final     Potassium   Date Value Ref Range Status   08/07/2025 4.9 3.5 - 5.1 mmol/L Final     Chloride   Date Value Ref Range Status   08/07/2025 106 98 - 107 mmol/L Final     CO2   Date Value Ref Range Status   08/07/2025 24 23 - 31 mmol/L Final     Glucose   Date Value Ref Range Status   08/07/2025 122 (H) 82 - 115 mg/dL Final     Blood Urea Nitrogen   Date Value Ref Range Status   08/07/2025 43.7 (H) 9.8 - 20.1 mg/dL Final     Creatinine   Date Value Ref Range Status   08/07/2025 1.46 (H) 0.55 - 1.02 mg/dL Final     Calcium   Date Value Ref Range Status   08/07/2025 9.7 8.4 - 10.2 mg/dL Final     Protein Total   Date Value Ref Range Status   08/07/2025 8.4 (H) 5.8 - 7.6 gm/dL Final     Albumin   Date Value Ref Range Status   08/07/2025 3.7 3.4 - 4.8 g/dL Final     Bilirubin Total   Date Value Ref Range Status   08/07/2025 0.4 <=1.5 mg/dL Final     ALP   Date Value Ref Range Status   08/07/2025 104 40 - 150 unit/L Final     AST   Date Value Ref Range Status   08/07/2025 17 11 - 45 unit/L Final     ALT   Date Value Ref Range Status   08/07/2025 14 0 - 55 unit/L Final     Estimated GFR-Non    Date Value Ref Range Status   11/08/2021 52 mL/min/1.73 m2 Final         Assessment:       1. Malignant neoplasm of ascending colon    2. History of chemotherapy    3. Ductal carcinoma in situ (DCIS) of right breast    4. History of lumpectomy of right breast    5. History of aromatase inhibitor therapy    6. Mass of upper outer quadrant of right breast          1) T2N1M0-Stage III colon cancer-- Dx 6/20/2018  --Xeloda + Oxaloplatin X 4 CYCLES (12 WEEKS) - (8/28/18-10/30/18)   --Colonoscopy in 7/2019-- s/p polypectomy x 2.   --Repeated 8/2021- diverticulitis.    --CT C/A/P for surveillance-- 4/2025 with MIGUELITO    2) H/O right DCIS with  microinvasion--[id number] - Stage IA Right Breast Cancer (Dx 2007)  --Right breast lumpectomy and axillary lymph node dissection (12/5/2007)  --No chemotherapy  --No radiation therapy  --Hormonal therapy x 5 yrs    3) Triple positive right breast cancer.-New    Patient with good performance status. Discussed with Dr Efrain Will possible role of neoadjuvant chemotherapy.   Discussed with the patient that Neoadjuvant chemotherapy is systemic therapy that include chemotherapy in the Herceptin given prior to her surgery. I think that she will benefit of it.  Discussed briefly risks versus benefits as well as toxicities associated with therapy.  She agree with the plan.    Educated the patient on the risks versus benefits as well as toxicities associated with treatment.  Verbally consented the patient to the treatment plan and the patient was educated on the planned duration of the treatment and schedule of the treatment administration.  All questions were answered.          Plan:      Plan: Neoadjuvant chemotherapy/Herceptin (TCH) x 6 --> surgery --> Herceptin for total of 1 year with endocrine therapy      08/07/25            Labs today  Echo soon and q 3 months while on Herceptin  Referral to Genetics  RTC in 2 weeks for treatment plan    Encouraged to call for any questions or problems  The patient was given ample opportunity to ask questions and they were all answered to satisfaction; patient demonstrated understanding of what we discussed and is agreeable to the plan.     code applies: Patient requires or will require continuous, longitudinal, and active collaborative plan of care related to this patient's health condition, breast cancer - the management of which requires the direction of a practitioner with specialized clinical knowledge, skill, and expertise.         Mary Barakat MD  Hematology/Oncology  [facility]      Professional Services   I, Shabana Engle LPN, acted solely as a  wendy for and in the presence of Dr. Mary Barakat, who performed these services.

## 2025-08-07 ENCOUNTER — OFFICE VISIT (OUTPATIENT)
Dept: HEMATOLOGY/ONCOLOGY | Facility: CLINIC | Age: 75
End: 2025-08-07
Payer: MEDICARE

## 2025-08-07 VITALS
SYSTOLIC BLOOD PRESSURE: 135 MMHG | HEART RATE: 71 BPM | BODY MASS INDEX: 40.48 KG/M2 | RESPIRATION RATE: 18 BRPM | HEIGHT: 65 IN | TEMPERATURE: 98 F | WEIGHT: 243 LBS | OXYGEN SATURATION: 95 % | DIASTOLIC BLOOD PRESSURE: 62 MMHG

## 2025-08-07 DIAGNOSIS — Z92.21 HISTORY OF AROMATASE INHIBITOR THERAPY: ICD-10-CM

## 2025-08-07 DIAGNOSIS — Z98.890 HISTORY OF LUMPECTOMY OF RIGHT BREAST: ICD-10-CM

## 2025-08-07 DIAGNOSIS — N63.11 MASS OF UPPER OUTER QUADRANT OF RIGHT BREAST: ICD-10-CM

## 2025-08-07 DIAGNOSIS — D05.11 DUCTAL CARCINOMA IN SITU (DCIS) OF RIGHT BREAST: ICD-10-CM

## 2025-08-07 DIAGNOSIS — Z17.0 MALIGNANT NEOPLASM OF UPPER-OUTER QUADRANT OF RIGHT BREAST IN FEMALE, ESTROGEN RECEPTOR POSITIVE: ICD-10-CM

## 2025-08-07 DIAGNOSIS — C18.2 MALIGNANT NEOPLASM OF ASCENDING COLON: Primary | ICD-10-CM

## 2025-08-07 DIAGNOSIS — C50.411 MALIGNANT NEOPLASM OF UPPER-OUTER QUADRANT OF RIGHT BREAST IN FEMALE, ESTROGEN RECEPTOR POSITIVE: ICD-10-CM

## 2025-08-07 DIAGNOSIS — Z92.21 HISTORY OF CHEMOTHERAPY: ICD-10-CM

## 2025-08-07 LAB
ALBUMIN SERPL-MCNC: 3.7 G/DL (ref 3.4–4.8)
ALBUMIN/GLOB SERPL: 0.8 RATIO (ref 1.1–2)
ALP SERPL-CCNC: 104 UNIT/L (ref 40–150)
ALT SERPL-CCNC: 14 UNIT/L (ref 0–55)
ANION GAP SERPL CALC-SCNC: 10 MEQ/L
AST SERPL-CCNC: 17 UNIT/L (ref 11–45)
BASOPHILS # BLD AUTO: 0.04 X10(3)/MCL
BASOPHILS NFR BLD AUTO: 0.7 %
BILIRUB SERPL-MCNC: 0.4 MG/DL
BUN SERPL-MCNC: 43.7 MG/DL (ref 9.8–20.1)
CALCIUM SERPL-MCNC: 9.7 MG/DL (ref 8.4–10.2)
CEA SERPL-MCNC: <1.73 NG/ML (ref 0–3)
CHLORIDE SERPL-SCNC: 106 MMOL/L (ref 98–107)
CO2 SERPL-SCNC: 24 MMOL/L (ref 23–31)
CREAT SERPL-MCNC: 1.46 MG/DL (ref 0.55–1.02)
CREAT/UREA NIT SERPL: 30
EOSINOPHIL # BLD AUTO: 0.14 X10(3)/MCL (ref 0–0.9)
EOSINOPHIL NFR BLD AUTO: 2.3 %
ERYTHROCYTE [DISTWIDTH] IN BLOOD BY AUTOMATED COUNT: 13.5 % (ref 11.5–17)
GFR SERPLBLD CREATININE-BSD FMLA CKD-EPI: 37 ML/MIN/1.73/M2
GLOBULIN SER-MCNC: 4.7 GM/DL (ref 2.4–3.5)
GLUCOSE SERPL-MCNC: 122 MG/DL (ref 82–115)
HCT VFR BLD AUTO: 35.3 % (ref 37–47)
HGB BLD-MCNC: 11.1 G/DL (ref 12–16)
IMM GRANULOCYTES # BLD AUTO: 0.02 X10(3)/MCL (ref 0–0.04)
IMM GRANULOCYTES NFR BLD AUTO: 0.3 %
LYMPHOCYTES # BLD AUTO: 1.2 X10(3)/MCL (ref 0.6–4.6)
LYMPHOCYTES NFR BLD AUTO: 20.1 %
MCH RBC QN AUTO: 29.2 PG (ref 27–31)
MCHC RBC AUTO-ENTMCNC: 31.4 G/DL (ref 33–36)
MCV RBC AUTO: 92.9 FL (ref 80–94)
MONOCYTES # BLD AUTO: 0.58 X10(3)/MCL (ref 0.1–1.3)
MONOCYTES NFR BLD AUTO: 9.7 %
NEUTROPHILS # BLD AUTO: 3.98 X10(3)/MCL (ref 2.1–9.2)
NEUTROPHILS NFR BLD AUTO: 66.9 %
PLATELET # BLD AUTO: 257 X10(3)/MCL (ref 130–400)
PMV BLD AUTO: 9.6 FL (ref 7.4–10.4)
POTASSIUM SERPL-SCNC: 4.9 MMOL/L (ref 3.5–5.1)
PROT SERPL-MCNC: 8.4 GM/DL (ref 5.8–7.6)
RBC # BLD AUTO: 3.8 X10(6)/MCL (ref 4.2–5.4)
SODIUM SERPL-SCNC: 140 MMOL/L (ref 136–145)
WBC # BLD AUTO: 5.96 X10(3)/MCL (ref 4.5–11.5)

## 2025-08-07 PROCEDURE — 80053 COMPREHEN METABOLIC PANEL: CPT | Performed by: INTERNAL MEDICINE

## 2025-08-07 PROCEDURE — 85025 COMPLETE CBC W/AUTO DIFF WBC: CPT | Performed by: INTERNAL MEDICINE

## 2025-08-07 PROCEDURE — 36415 COLL VENOUS BLD VENIPUNCTURE: CPT | Performed by: INTERNAL MEDICINE

## 2025-08-07 PROCEDURE — 99999 PR PBB SHADOW E&M-EST. PATIENT-LVL V: CPT | Mod: PBBFAC,,, | Performed by: INTERNAL MEDICINE

## 2025-08-07 PROCEDURE — 82378 CARCINOEMBRYONIC ANTIGEN: CPT | Performed by: INTERNAL MEDICINE

## 2025-08-14 ENCOUNTER — HOSPITAL ENCOUNTER (OUTPATIENT)
Dept: CARDIOLOGY | Facility: HOSPITAL | Age: 75
Discharge: HOME | End: 2025-08-14
Attending: INTERNAL MEDICINE
Payer: MEDICARE

## 2025-08-14 DIAGNOSIS — D05.11 DUCTAL CARCINOMA IN SITU (DCIS) OF RIGHT BREAST: ICD-10-CM

## 2025-08-14 DIAGNOSIS — C18.2 MALIGNANT NEOPLASM OF ASCENDING COLON: ICD-10-CM

## 2025-08-14 DIAGNOSIS — Z92.21 HISTORY OF AROMATASE INHIBITOR THERAPY: ICD-10-CM

## 2025-08-14 LAB
APICAL FOUR CHAMBER EJECTION FRACTION: 61 %
APICAL TWO CHAMBER EJECTION FRACTION: 57 %
AV INDEX (PROSTH): 0.61
AV MEAN GRADIENT: 6 MMHG
AV PEAK GRADIENT: 13 MMHG
AV VALVE AREA BY VELOCITY RATIO: 2.1 CM²
AV VALVE AREA: 1.9 CM²
AV VELOCITY RATIO: 0.67
CV ECHO LV RWT: 0.47 CM
DOP CALC AO PEAK VEL: 1.8 M/S
DOP CALC AO VTI: 35.9 CM
DOP CALC LVOT AREA: 3.1 CM2
DOP CALC LVOT DIAMETER: 2 CM
DOP CALC LVOT PEAK VEL: 1.2 M/S
DOP CALC MV VTI: 33.4 CM
DOP CALCLVOT PEAK VEL VTI: 22 CM
E WAVE DECELERATION TIME: 248 MSEC
E/A RATIO: 0.83
E/E' RATIO: 8 M/S
ECHO LV POSTERIOR WALL: 1.1 CM (ref 0.6–1.1)
FRACTIONAL SHORTENING: 29.8 % (ref 28–44)
GLOBAL LONGITUIDAL STRAIN: 21 %
HR MV ECHO: 56 BPM
INTERVENTRICULAR SEPTUM: 1 CM (ref 0.6–1.1)
LEFT ATRIUM AREA SYSTOLIC (APICAL 4 CHAMBER): 24.7 CM2
LEFT ATRIUM SIZE: 3.4 CM
LEFT INTERNAL DIMENSION IN SYSTOLE: 3.3 CM (ref 2.1–4)
LEFT VENTRICLE DIASTOLIC VOLUME: 102 ML
LEFT VENTRICLE END DIASTOLIC VOLUME APICAL 2 CHAMBER: 81.6 ML
LEFT VENTRICLE END DIASTOLIC VOLUME APICAL 4 CHAMBER: 109 ML
LEFT VENTRICLE END SYSTOLIC VOLUME APICAL 4 CHAMBER: 69.8 ML
LEFT VENTRICLE SYSTOLIC VOLUME: 44 ML
LEFT VENTRICULAR INTERNAL DIMENSION IN DIASTOLE: 4.7 CM (ref 3.5–6)
LEFT VENTRICULAR MASS: 175.8 G
LV LATERAL E/E' RATIO: 6.1 M/S
LV SEPTAL E/E' RATIO: 12.2 M/S
LVED V (TEICH): 102 ML
LVES V (TEICH): 44.1 ML
LVOT MG: 3 MMHG
LVOT MV: 0.7 CM/S
MV MEAN GRADIENT: 2 MMHG
MV PEAK A VEL: 0.88 M/S
MV PEAK E VEL: 0.73 M/S
MV PEAK GRADIENT: 5 MMHG
MV STENOSIS PRESSURE HALF TIME: 53 MS
MV VALVE AREA BY CONTINUITY EQUATION: 2.07 CM2
MV VALVE AREA P 1/2 METHOD: 4.15 CM2
OHS LV EJECTION FRACTION SIMPSONS BIPLANE MOD: 58 %
PISA TR MAX VEL: 2.6 M/S
PV PEAK GRADIENT: 2 MMHG
PV PEAK VELOCITY: 0.68 M/S
TDI LATERAL: 0.12 M/S
TDI SEPTAL: 0.06 M/S
TDI: 0.09 M/S
TR MAX PG: 28 MMHG
TRICUSPID ANNULAR PLANE SYSTOLIC EXCURSION: 3.3 CM

## 2025-08-14 PROCEDURE — 93306 TTE W/DOPPLER COMPLETE: CPT | Mod: 26,,, | Performed by: INTERNAL MEDICINE

## 2025-08-14 PROCEDURE — 93356 MYOCRD STRAIN IMG SPCKL TRCK: CPT | Mod: ,,, | Performed by: INTERNAL MEDICINE

## 2025-08-14 PROCEDURE — 93356 MYOCRD STRAIN IMG SPCKL TRCK: CPT

## 2025-08-20 ENCOUNTER — OFFICE VISIT (OUTPATIENT)
Dept: HEMATOLOGY/ONCOLOGY | Facility: CLINIC | Age: 75
End: 2025-08-20
Payer: MEDICARE

## 2025-08-20 DIAGNOSIS — D05.11 DUCTAL CARCINOMA IN SITU (DCIS) OF RIGHT BREAST: ICD-10-CM

## 2025-08-20 DIAGNOSIS — C18.2 MALIGNANT NEOPLASM OF ASCENDING COLON: Primary | ICD-10-CM

## 2025-08-20 DIAGNOSIS — Z92.21 HISTORY OF CHEMOTHERAPY: ICD-10-CM

## 2025-08-20 LAB
ALBUMIN SERPL-MCNC: 3.6 G/DL (ref 3.4–4.8)
ALBUMIN/GLOB SERPL: 0.8 RATIO (ref 1.1–2)
ALP SERPL-CCNC: 99 UNIT/L (ref 40–150)
ALT SERPL-CCNC: 11 UNIT/L (ref 0–55)
ANION GAP SERPL CALC-SCNC: 9 MEQ/L
AST SERPL-CCNC: 17 UNIT/L (ref 11–45)
BASOPHILS # BLD AUTO: 0.04 X10(3)/MCL
BASOPHILS NFR BLD AUTO: 0.7 %
BILIRUB SERPL-MCNC: 0.5 MG/DL
BUN SERPL-MCNC: 39.9 MG/DL (ref 9.8–20.1)
CALCIUM SERPL-MCNC: 9.6 MG/DL (ref 8.4–10.2)
CHLORIDE SERPL-SCNC: 106 MMOL/L (ref 98–107)
CO2 SERPL-SCNC: 25 MMOL/L (ref 23–31)
CREAT SERPL-MCNC: 1.37 MG/DL (ref 0.55–1.02)
CREAT/UREA NIT SERPL: 29
EOSINOPHIL # BLD AUTO: 0.18 X10(3)/MCL (ref 0–0.9)
EOSINOPHIL NFR BLD AUTO: 3.2 %
ERYTHROCYTE [DISTWIDTH] IN BLOOD BY AUTOMATED COUNT: 13.2 % (ref 11.5–17)
GFR SERPLBLD CREATININE-BSD FMLA CKD-EPI: 40 ML/MIN/1.73/M2
GLOBULIN SER-MCNC: 4.8 GM/DL (ref 2.4–3.5)
GLUCOSE SERPL-MCNC: 125 MG/DL (ref 82–115)
HCT VFR BLD AUTO: 33.8 % (ref 37–47)
HGB BLD-MCNC: 11.2 G/DL (ref 12–16)
IMM GRANULOCYTES # BLD AUTO: 0.01 X10(3)/MCL (ref 0–0.04)
IMM GRANULOCYTES NFR BLD AUTO: 0.2 %
LYMPHOCYTES # BLD AUTO: 1.64 X10(3)/MCL (ref 0.6–4.6)
LYMPHOCYTES NFR BLD AUTO: 28.7 %
MAGNESIUM SERPL-MCNC: 2.4 MG/DL (ref 1.6–2.6)
MCH RBC QN AUTO: 29.9 PG (ref 27–31)
MCHC RBC AUTO-ENTMCNC: 33.1 G/DL (ref 33–36)
MCV RBC AUTO: 90.4 FL (ref 80–94)
MONOCYTES # BLD AUTO: 0.66 X10(3)/MCL (ref 0.1–1.3)
MONOCYTES NFR BLD AUTO: 11.6 %
NEUTROPHILS # BLD AUTO: 3.18 X10(3)/MCL (ref 2.1–9.2)
NEUTROPHILS NFR BLD AUTO: 55.6 %
PLATELET # BLD AUTO: 245 X10(3)/MCL (ref 130–400)
PMV BLD AUTO: 9.3 FL (ref 7.4–10.4)
POTASSIUM SERPL-SCNC: 5.6 MMOL/L (ref 3.5–5.1)
PROT SERPL-MCNC: 8.4 GM/DL (ref 5.8–7.6)
RBC # BLD AUTO: 3.74 X10(6)/MCL (ref 4.2–5.4)
SODIUM SERPL-SCNC: 140 MMOL/L (ref 136–145)
WBC # BLD AUTO: 5.71 X10(3)/MCL (ref 4.5–11.5)

## 2025-08-20 PROCEDURE — 1159F MED LIST DOCD IN RCRD: CPT | Mod: CPTII,S$GLB,, | Performed by: NURSE PRACTITIONER

## 2025-08-20 PROCEDURE — 85025 COMPLETE CBC W/AUTO DIFF WBC: CPT | Performed by: NURSE PRACTITIONER

## 2025-08-20 PROCEDURE — 99999 PR PBB SHADOW E&M-EST. PATIENT-LVL II: CPT | Mod: PBBFAC,,, | Performed by: NURSE PRACTITIONER

## 2025-08-20 PROCEDURE — 1101F PT FALLS ASSESS-DOCD LE1/YR: CPT | Mod: CPTII,S$GLB,, | Performed by: NURSE PRACTITIONER

## 2025-08-20 PROCEDURE — 99214 OFFICE O/P EST MOD 30 MIN: CPT | Mod: S$GLB,,, | Performed by: NURSE PRACTITIONER

## 2025-08-20 PROCEDURE — 3288F FALL RISK ASSESSMENT DOCD: CPT | Mod: CPTII,S$GLB,, | Performed by: NURSE PRACTITIONER

## 2025-08-20 PROCEDURE — 1160F RVW MEDS BY RX/DR IN RCRD: CPT | Mod: CPTII,S$GLB,, | Performed by: NURSE PRACTITIONER

## 2025-08-20 PROCEDURE — 36415 COLL VENOUS BLD VENIPUNCTURE: CPT | Performed by: NURSE PRACTITIONER

## 2025-08-20 PROCEDURE — 80053 COMPREHEN METABOLIC PANEL: CPT | Performed by: NURSE PRACTITIONER

## 2025-08-20 PROCEDURE — 83735 ASSAY OF MAGNESIUM: CPT | Performed by: NURSE PRACTITIONER

## 2025-08-20 RX ORDER — DIPHENHYDRAMINE HYDROCHLORIDE 50 MG/ML
50 INJECTION, SOLUTION INTRAMUSCULAR; INTRAVENOUS ONCE AS NEEDED
OUTPATIENT
Start: 2025-08-20 | End: 2037-01-16

## 2025-08-20 RX ORDER — EPINEPHRINE 0.3 MG/.3ML
0.3 INJECTION SUBCUTANEOUS ONCE AS NEEDED
OUTPATIENT
Start: 2025-08-20 | End: 2037-01-16

## 2025-08-20 RX ORDER — HEPARIN 100 UNIT/ML
500 SYRINGE INTRAVENOUS
OUTPATIENT
Start: 2025-08-20

## 2025-08-20 RX ORDER — SODIUM CHLORIDE 0.9 % (FLUSH) 0.9 %
10 SYRINGE (ML) INJECTION
OUTPATIENT
Start: 2025-08-20

## 2025-08-27 ENCOUNTER — OFFICE VISIT (OUTPATIENT)
Dept: HEMATOLOGY/ONCOLOGY | Facility: CLINIC | Age: 75
End: 2025-08-27
Payer: MEDICARE

## 2025-08-27 DIAGNOSIS — C18.2 MALIGNANT NEOPLASM OF ASCENDING COLON: ICD-10-CM

## 2025-08-27 DIAGNOSIS — D05.11 DUCTAL CARCINOMA IN SITU (DCIS) OF RIGHT BREAST: ICD-10-CM

## 2025-08-27 DIAGNOSIS — Z80.3 FAMILY HISTORY OF BREAST CANCER: ICD-10-CM

## 2025-08-27 DIAGNOSIS — C50.411 MALIGNANT NEOPLASM OF UPPER-OUTER QUADRANT OF RIGHT BREAST IN FEMALE, ESTROGEN RECEPTOR POSITIVE: Primary | ICD-10-CM

## 2025-08-27 DIAGNOSIS — Z80.0 FAMILY HISTORY OF COLON CANCER: ICD-10-CM

## 2025-08-27 DIAGNOSIS — Z17.0 MALIGNANT NEOPLASM OF UPPER-OUTER QUADRANT OF RIGHT BREAST IN FEMALE, ESTROGEN RECEPTOR POSITIVE: Primary | ICD-10-CM

## 2025-09-03 ENCOUNTER — OFFICE VISIT (OUTPATIENT)
Dept: HEMATOLOGY/ONCOLOGY | Facility: CLINIC | Age: 75
End: 2025-09-03
Payer: MEDICARE

## 2025-09-03 VITALS
BODY MASS INDEX: 40.62 KG/M2 | WEIGHT: 243.81 LBS | HEIGHT: 65 IN | HEART RATE: 63 BPM | DIASTOLIC BLOOD PRESSURE: 80 MMHG | OXYGEN SATURATION: 99 % | SYSTOLIC BLOOD PRESSURE: 149 MMHG | TEMPERATURE: 98 F | RESPIRATION RATE: 18 BRPM

## 2025-09-03 DIAGNOSIS — C50.411 MALIGNANT NEOPLASM OF UPPER-OUTER QUADRANT OF RIGHT BREAST IN FEMALE, ESTROGEN RECEPTOR POSITIVE: Primary | ICD-10-CM

## 2025-09-03 DIAGNOSIS — C18.2 MALIGNANT NEOPLASM OF ASCENDING COLON: ICD-10-CM

## 2025-09-03 DIAGNOSIS — Z17.0 MALIGNANT NEOPLASM OF UPPER-OUTER QUADRANT OF RIGHT BREAST IN FEMALE, ESTROGEN RECEPTOR POSITIVE: Primary | ICD-10-CM

## 2025-09-03 PROCEDURE — 3288F FALL RISK ASSESSMENT DOCD: CPT | Mod: CPTII,S$GLB,, | Performed by: INTERNAL MEDICINE

## 2025-09-03 PROCEDURE — 1126F AMNT PAIN NOTED NONE PRSNT: CPT | Mod: CPTII,S$GLB,, | Performed by: INTERNAL MEDICINE

## 2025-09-03 PROCEDURE — 99215 OFFICE O/P EST HI 40 MIN: CPT | Mod: S$GLB,,, | Performed by: INTERNAL MEDICINE

## 2025-09-03 PROCEDURE — G2211 COMPLEX E/M VISIT ADD ON: HCPCS | Mod: S$GLB,,, | Performed by: INTERNAL MEDICINE

## 2025-09-03 PROCEDURE — 99999 PR PBB SHADOW E&M-EST. PATIENT-LVL IV: CPT | Mod: PBBFAC,,, | Performed by: INTERNAL MEDICINE

## 2025-09-03 PROCEDURE — 1160F RVW MEDS BY RX/DR IN RCRD: CPT | Mod: CPTII,S$GLB,, | Performed by: INTERNAL MEDICINE

## 2025-09-03 PROCEDURE — 1159F MED LIST DOCD IN RCRD: CPT | Mod: CPTII,S$GLB,, | Performed by: INTERNAL MEDICINE

## 2025-09-03 PROCEDURE — 1101F PT FALLS ASSESS-DOCD LE1/YR: CPT | Mod: CPTII,S$GLB,, | Performed by: INTERNAL MEDICINE

## 2025-09-03 PROCEDURE — 3077F SYST BP >= 140 MM HG: CPT | Mod: CPTII,S$GLB,, | Performed by: INTERNAL MEDICINE

## 2025-09-03 PROCEDURE — 3079F DIAST BP 80-89 MM HG: CPT | Mod: CPTII,S$GLB,, | Performed by: INTERNAL MEDICINE

## 2025-09-03 RX ORDER — ONDANSETRON 8 MG/1
8 TABLET, ORALLY DISINTEGRATING ORAL EVERY 8 HOURS PRN
Qty: 60 TABLET | Refills: 5 | Status: SHIPPED | OUTPATIENT
Start: 2025-09-03

## 2025-09-03 RX ORDER — DEXAMETHASONE 4 MG/1
8 TABLET ORAL 2 TIMES DAILY
Qty: 24 TABLET | Refills: 1 | Status: SHIPPED | OUTPATIENT
Start: 2025-09-04

## 2025-09-03 RX ORDER — OLANZAPINE 5 MG/1
5 TABLET, FILM COATED ORAL NIGHTLY
Qty: 30 TABLET | Refills: 5 | Status: SHIPPED | OUTPATIENT
Start: 2025-09-03

## 2025-09-04 ENCOUNTER — INFUSION (OUTPATIENT)
Dept: INFUSION THERAPY | Facility: HOSPITAL | Age: 75
End: 2025-09-04
Attending: INTERNAL MEDICINE
Payer: MEDICARE

## 2025-09-04 ENCOUNTER — TELEPHONE (OUTPATIENT)
Dept: HEMATOLOGY/ONCOLOGY | Facility: CLINIC | Age: 75
End: 2025-09-04
Payer: MEDICARE

## 2025-09-04 VITALS
RESPIRATION RATE: 18 BRPM | HEIGHT: 65 IN | WEIGHT: 243.63 LBS | HEART RATE: 58 BPM | DIASTOLIC BLOOD PRESSURE: 62 MMHG | TEMPERATURE: 97 F | SYSTOLIC BLOOD PRESSURE: 130 MMHG | BODY MASS INDEX: 40.59 KG/M2

## 2025-09-04 DIAGNOSIS — Z17.0 MALIGNANT NEOPLASM OF UPPER-OUTER QUADRANT OF RIGHT BREAST IN FEMALE, ESTROGEN RECEPTOR POSITIVE: Primary | ICD-10-CM

## 2025-09-04 DIAGNOSIS — C50.411 MALIGNANT NEOPLASM OF UPPER-OUTER QUADRANT OF RIGHT BREAST IN FEMALE, ESTROGEN RECEPTOR POSITIVE: Primary | ICD-10-CM

## 2025-09-04 PROCEDURE — 63600175 PHARM REV CODE 636 W HCPCS: Performed by: INTERNAL MEDICINE

## 2025-09-04 PROCEDURE — 96377 APPLICATON ON-BODY INJECTOR: CPT

## 2025-09-04 PROCEDURE — 96367 TX/PROPH/DG ADDL SEQ IV INF: CPT

## 2025-09-04 PROCEDURE — 96375 TX/PRO/DX INJ NEW DRUG ADDON: CPT

## 2025-09-04 PROCEDURE — 25000003 PHARM REV CODE 250: Performed by: INTERNAL MEDICINE

## 2025-09-04 PROCEDURE — 96417 CHEMO IV INFUS EACH ADDL SEQ: CPT

## 2025-09-04 PROCEDURE — 96413 CHEMO IV INFUSION 1 HR: CPT

## 2025-09-04 RX ORDER — DIPHENHYDRAMINE HYDROCHLORIDE 50 MG/ML
25 INJECTION, SOLUTION INTRAMUSCULAR; INTRAVENOUS
Status: COMPLETED | OUTPATIENT
Start: 2025-09-04 | End: 2025-09-04

## 2025-09-04 RX ORDER — EPINEPHRINE 0.3 MG/.3ML
0.3 INJECTION SUBCUTANEOUS ONCE AS NEEDED
Status: DISCONTINUED | OUTPATIENT
Start: 2025-09-04 | End: 2025-09-04 | Stop reason: HOSPADM

## 2025-09-04 RX ORDER — SODIUM CHLORIDE 0.9 % (FLUSH) 0.9 %
10 SYRINGE (ML) INJECTION
Status: DISCONTINUED | OUTPATIENT
Start: 2025-09-04 | End: 2025-09-04 | Stop reason: HOSPADM

## 2025-09-04 RX ORDER — FAMOTIDINE 10 MG/ML
20 INJECTION, SOLUTION INTRAVENOUS
Status: COMPLETED | OUTPATIENT
Start: 2025-09-04 | End: 2025-09-04

## 2025-09-04 RX ORDER — HEPARIN 100 UNIT/ML
500 SYRINGE INTRAVENOUS
Status: DISCONTINUED | OUTPATIENT
Start: 2025-09-04 | End: 2025-09-04 | Stop reason: HOSPADM

## 2025-09-04 RX ORDER — DIPHENHYDRAMINE HYDROCHLORIDE 50 MG/ML
50 INJECTION, SOLUTION INTRAMUSCULAR; INTRAVENOUS ONCE AS NEEDED
Status: DISCONTINUED | OUTPATIENT
Start: 2025-09-04 | End: 2025-09-04 | Stop reason: HOSPADM

## 2025-09-04 RX ADMIN — DIPHENHYDRAMINE HYDROCHLORIDE 25 MG: 50 INJECTION INTRAMUSCULAR; INTRAVENOUS at 08:09

## 2025-09-04 RX ADMIN — FAMOTIDINE 20 MG: 10 INJECTION, SOLUTION INTRAVENOUS at 08:09

## 2025-09-04 RX ADMIN — CARBOPLATIN 485 MG: 10 INJECTION, SOLUTION INTRAVENOUS at 11:09

## 2025-09-04 RX ADMIN — PALONOSETRON HYDROCHLORIDE 0.25 MG: 0.25 INJECTION, SOLUTION INTRAVENOUS at 08:09

## 2025-09-04 RX ADMIN — APREPITANT 130 MG: 130 INJECTION, EMULSION INTRAVENOUS at 08:09

## 2025-09-04 RX ADMIN — PEGFILGRASTIM 6 MG: KIT SUBCUTANEOUS at 11:09

## 2025-09-04 RX ADMIN — DOCETAXEL 160 MG: 20 INJECTION, SOLUTION, CONCENTRATE INTRAVENOUS at 10:09

## 2025-09-04 RX ADMIN — TRASTUZUMAB-ANNS 840 MG: 420 INJECTION, POWDER, LYOPHILIZED, FOR SOLUTION INTRAVENOUS at 09:09
